# Patient Record
Sex: MALE | Race: ASIAN | NOT HISPANIC OR LATINO | URBAN - METROPOLITAN AREA
[De-identification: names, ages, dates, MRNs, and addresses within clinical notes are randomized per-mention and may not be internally consistent; named-entity substitution may affect disease eponyms.]

---

## 2020-02-10 ENCOUNTER — INPATIENT (INPATIENT)
Facility: HOSPITAL | Age: 85
LOS: 17 days | Discharge: NURSING, REHAB OR LONG-TERM CARE | DRG: 280 | End: 2020-02-28
Attending: HOSPITALIST | Admitting: HOSPITALIST
Payer: MEDICARE

## 2020-02-10 VITALS
SYSTOLIC BLOOD PRESSURE: 79 MMHG | OXYGEN SATURATION: 97 % | DIASTOLIC BLOOD PRESSURE: 61 MMHG | RESPIRATION RATE: 29 BRPM

## 2020-02-10 DIAGNOSIS — Z95.1 PRESENCE OF AORTOCORONARY BYPASS GRAFT: Chronic | ICD-10-CM

## 2020-02-10 DIAGNOSIS — Z98.890 OTHER SPECIFIED POSTPROCEDURAL STATES: Chronic | ICD-10-CM

## 2020-02-10 LAB
ALBUMIN SERPL ELPH-MCNC: 3 G/DL — LOW (ref 3.3–5)
ALBUMIN SERPL ELPH-MCNC: 3.2 G/DL — LOW (ref 3.3–5)
ALBUMIN SERPL ELPH-MCNC: 3.3 G/DL — SIGNIFICANT CHANGE UP (ref 3.3–5)
ALBUMIN SERPL ELPH-MCNC: 3.5 G/DL — SIGNIFICANT CHANGE UP (ref 3.3–5)
ALP SERPL-CCNC: 47 U/L — SIGNIFICANT CHANGE UP (ref 40–120)
ALP SERPL-CCNC: 49 U/L — SIGNIFICANT CHANGE UP (ref 40–120)
ALP SERPL-CCNC: 52 U/L — SIGNIFICANT CHANGE UP (ref 40–120)
ALP SERPL-CCNC: 52 U/L — SIGNIFICANT CHANGE UP (ref 40–120)
ALT FLD-CCNC: 46 U/L — HIGH (ref 10–45)
ALT FLD-CCNC: 48 U/L — HIGH (ref 10–45)
ALT FLD-CCNC: 49 U/L — HIGH (ref 10–45)
ALT FLD-CCNC: 55 U/L — HIGH (ref 10–45)
ANION GAP SERPL CALC-SCNC: 13 MMOL/L — SIGNIFICANT CHANGE UP (ref 5–17)
ANION GAP SERPL CALC-SCNC: 14 MMOL/L — SIGNIFICANT CHANGE UP (ref 5–17)
ANION GAP SERPL CALC-SCNC: 14 MMOL/L — SIGNIFICANT CHANGE UP (ref 5–17)
ANION GAP SERPL CALC-SCNC: 15 MMOL/L — SIGNIFICANT CHANGE UP (ref 5–17)
APPEARANCE UR: CLEAR — SIGNIFICANT CHANGE UP
APTT BLD: 32.3 SEC — SIGNIFICANT CHANGE UP (ref 27.5–36.3)
APTT BLD: 37.2 SEC — HIGH (ref 27.5–36.3)
APTT BLD: 69.8 SEC — HIGH (ref 27.5–36.3)
APTT BLD: 88.7 SEC — HIGH (ref 27.5–36.3)
AST SERPL-CCNC: 101 U/L — HIGH (ref 10–40)
AST SERPL-CCNC: 101 U/L — HIGH (ref 10–40)
AST SERPL-CCNC: 96 U/L — HIGH (ref 10–40)
AST SERPL-CCNC: 99 U/L — HIGH (ref 10–40)
BASE EXCESS BLDA CALC-SCNC: -0.3 MMOL/L — SIGNIFICANT CHANGE UP (ref -2–3)
BASE EXCESS BLDA CALC-SCNC: -2.6 MMOL/L — LOW (ref -2–3)
BASE EXCESS BLDA CALC-SCNC: 0.2 MMOL/L — SIGNIFICANT CHANGE UP (ref -2–3)
BASE EXCESS BLDV CALC-SCNC: -8.6 MMOL/L — SIGNIFICANT CHANGE UP
BASOPHILS # BLD AUTO: 0.03 K/UL — SIGNIFICANT CHANGE UP (ref 0–0.2)
BASOPHILS # BLD AUTO: 0.03 K/UL — SIGNIFICANT CHANGE UP (ref 0–0.2)
BASOPHILS # BLD AUTO: 0.04 K/UL — SIGNIFICANT CHANGE UP (ref 0–0.2)
BASOPHILS NFR BLD AUTO: 0.2 % — SIGNIFICANT CHANGE UP (ref 0–2)
BASOPHILS NFR BLD AUTO: 0.2 % — SIGNIFICANT CHANGE UP (ref 0–2)
BASOPHILS NFR BLD AUTO: 0.3 % — SIGNIFICANT CHANGE UP (ref 0–2)
BILIRUB SERPL-MCNC: 0.3 MG/DL — SIGNIFICANT CHANGE UP (ref 0.2–1.2)
BILIRUB SERPL-MCNC: 0.4 MG/DL — SIGNIFICANT CHANGE UP (ref 0.2–1.2)
BILIRUB UR-MCNC: NEGATIVE — SIGNIFICANT CHANGE UP
BLD GP AB SCN SERPL QL: NEGATIVE — SIGNIFICANT CHANGE UP
BLD GP AB SCN SERPL QL: NEGATIVE — SIGNIFICANT CHANGE UP
BUN SERPL-MCNC: 20 MG/DL — SIGNIFICANT CHANGE UP (ref 7–23)
BUN SERPL-MCNC: 22 MG/DL — SIGNIFICANT CHANGE UP (ref 7–23)
BUN SERPL-MCNC: 24 MG/DL — HIGH (ref 7–23)
BUN SERPL-MCNC: 27 MG/DL — HIGH (ref 7–23)
CALCIUM SERPL-MCNC: 8.3 MG/DL — LOW (ref 8.4–10.5)
CALCIUM SERPL-MCNC: 8.4 MG/DL — SIGNIFICANT CHANGE UP (ref 8.4–10.5)
CALCIUM SERPL-MCNC: 8.4 MG/DL — SIGNIFICANT CHANGE UP (ref 8.4–10.5)
CALCIUM SERPL-MCNC: 8.6 MG/DL — SIGNIFICANT CHANGE UP (ref 8.4–10.5)
CHLORIDE SERPL-SCNC: 108 MMOL/L — SIGNIFICANT CHANGE UP (ref 96–108)
CHLORIDE SERPL-SCNC: 109 MMOL/L — HIGH (ref 96–108)
CHLORIDE SERPL-SCNC: 110 MMOL/L — HIGH (ref 96–108)
CHLORIDE SERPL-SCNC: 110 MMOL/L — HIGH (ref 96–108)
CHOLEST SERPL-MCNC: 102 MG/DL — SIGNIFICANT CHANGE UP (ref 10–199)
CK MB CFR SERPL CALC: 33.2 NG/ML — HIGH (ref 0–6.7)
CK MB CFR SERPL CALC: 48.2 NG/ML — HIGH (ref 0–6.7)
CK MB CFR SERPL CALC: 53 NG/ML — HIGH (ref 0–6.7)
CK MB CFR SERPL CALC: 60.6 NG/ML — HIGH (ref 0–6.7)
CK MB CFR SERPL CALC: 63.3 NG/ML — HIGH (ref 0–6.7)
CK SERPL-CCNC: 496 U/L — HIGH (ref 30–200)
CK SERPL-CCNC: 695 U/L — HIGH (ref 30–200)
CK SERPL-CCNC: 832 U/L — HIGH (ref 30–200)
CK SERPL-CCNC: 923 U/L — HIGH (ref 30–200)
CK SERPL-CCNC: 963 U/L — HIGH (ref 30–200)
CO2 SERPL-SCNC: 16 MMOL/L — LOW (ref 22–31)
CO2 SERPL-SCNC: 18 MMOL/L — LOW (ref 22–31)
CO2 SERPL-SCNC: 19 MMOL/L — LOW (ref 22–31)
CO2 SERPL-SCNC: 20 MMOL/L — LOW (ref 22–31)
COLOR SPEC: YELLOW — SIGNIFICANT CHANGE UP
CREAT SERPL-MCNC: 0.99 MG/DL — SIGNIFICANT CHANGE UP (ref 0.5–1.3)
CREAT SERPL-MCNC: 1.02 MG/DL — SIGNIFICANT CHANGE UP (ref 0.5–1.3)
CREAT SERPL-MCNC: 1.07 MG/DL — SIGNIFICANT CHANGE UP (ref 0.5–1.3)
CREAT SERPL-MCNC: 1.16 MG/DL — SIGNIFICANT CHANGE UP (ref 0.5–1.3)
DIFF PNL FLD: ABNORMAL
EOSINOPHIL # BLD AUTO: 0 K/UL — SIGNIFICANT CHANGE UP (ref 0–0.5)
EOSINOPHIL NFR BLD AUTO: 0 % — SIGNIFICANT CHANGE UP (ref 0–6)
GAS PNL BLDA: SIGNIFICANT CHANGE UP
GAS PNL BLDA: SIGNIFICANT CHANGE UP
GAS PNL BLDV: SIGNIFICANT CHANGE UP
GAS PNL BLDV: SIGNIFICANT CHANGE UP
GLUCOSE BLDC GLUCOMTR-MCNC: 141 MG/DL — HIGH (ref 70–99)
GLUCOSE BLDC GLUCOMTR-MCNC: 195 MG/DL — HIGH (ref 70–99)
GLUCOSE SERPL-MCNC: 132 MG/DL — HIGH (ref 70–99)
GLUCOSE SERPL-MCNC: 174 MG/DL — HIGH (ref 70–99)
GLUCOSE SERPL-MCNC: 231 MG/DL — HIGH (ref 70–99)
GLUCOSE SERPL-MCNC: 244 MG/DL — HIGH (ref 70–99)
GLUCOSE UR QL: NEGATIVE — SIGNIFICANT CHANGE UP
HBA1C BLD-MCNC: 6.7 % — HIGH (ref 4–5.6)
HCO3 BLDA-SCNC: 21 MMOL/L — SIGNIFICANT CHANGE UP (ref 21–28)
HCO3 BLDA-SCNC: 22 MMOL/L — SIGNIFICANT CHANGE UP (ref 21–28)
HCO3 BLDA-SCNC: 22 MMOL/L — SIGNIFICANT CHANGE UP (ref 21–28)
HCO3 BLDV-SCNC: 17 MMOL/L — LOW (ref 20–27)
HCT VFR BLD CALC: 29.5 % — LOW (ref 39–50)
HCT VFR BLD CALC: 31 % — LOW (ref 39–50)
HCT VFR BLD CALC: 31 % — LOW (ref 39–50)
HCT VFR BLD CALC: 32.2 % — LOW (ref 39–50)
HCT VFR BLD CALC: 32.2 % — LOW (ref 39–50)
HDLC SERPL-MCNC: 32 MG/DL — LOW
HGB BLD-MCNC: 10.5 G/DL — LOW (ref 13–17)
HGB BLD-MCNC: 10.7 G/DL — LOW (ref 13–17)
HGB BLD-MCNC: 10.7 G/DL — LOW (ref 13–17)
HGB BLD-MCNC: 9.8 G/DL — LOW (ref 13–17)
HGB BLD-MCNC: 9.9 G/DL — LOW (ref 13–17)
IMM GRANULOCYTES NFR BLD AUTO: 0.5 % — SIGNIFICANT CHANGE UP (ref 0–1.5)
IMM GRANULOCYTES NFR BLD AUTO: 0.5 % — SIGNIFICANT CHANGE UP (ref 0–1.5)
IMM GRANULOCYTES NFR BLD AUTO: 0.6 % — SIGNIFICANT CHANGE UP (ref 0–1.5)
INR BLD: 1.25 — HIGH (ref 0.88–1.16)
INR BLD: 1.26 — HIGH (ref 0.88–1.16)
INR BLD: 1.39 — HIGH (ref 0.88–1.16)
KETONES UR-MCNC: ABNORMAL MG/DL
LACTATE SERPL-SCNC: 2.6 MMOL/L — HIGH (ref 0.5–2)
LACTATE SERPL-SCNC: 3.3 MMOL/L — HIGH (ref 0.5–2)
LACTATE SERPL-SCNC: 3.7 MMOL/L — HIGH (ref 0.5–2)
LACTATE SERPL-SCNC: 4.1 MMOL/L — CRITICAL HIGH (ref 0.5–2)
LACTATE SERPL-SCNC: 4.3 MMOL/L — CRITICAL HIGH (ref 0.5–2)
LACTATE SERPL-SCNC: 4.4 MMOL/L — CRITICAL HIGH (ref 0.5–2)
LACTATE SERPL-SCNC: 6.5 MMOL/L — CRITICAL HIGH (ref 0.5–2)
LEUKOCYTE ESTERASE UR-ACNC: ABNORMAL
LIPID PNL WITH DIRECT LDL SERPL: 28 MG/DL — SIGNIFICANT CHANGE UP
LYMPHOCYTES # BLD AUTO: 1.4 K/UL — SIGNIFICANT CHANGE UP (ref 1–3.3)
LYMPHOCYTES # BLD AUTO: 1.4 K/UL — SIGNIFICANT CHANGE UP (ref 1–3.3)
LYMPHOCYTES # BLD AUTO: 1.96 K/UL — SIGNIFICANT CHANGE UP (ref 1–3.3)
LYMPHOCYTES # BLD AUTO: 10.8 % — LOW (ref 13–44)
LYMPHOCYTES # BLD AUTO: 12.1 % — LOW (ref 13–44)
LYMPHOCYTES # BLD AUTO: 15.1 % — SIGNIFICANT CHANGE UP (ref 13–44)
MAGNESIUM SERPL-MCNC: 1.8 MG/DL — SIGNIFICANT CHANGE UP (ref 1.6–2.6)
MAGNESIUM SERPL-MCNC: 2 MG/DL — SIGNIFICANT CHANGE UP (ref 1.6–2.6)
MAGNESIUM SERPL-MCNC: 2 MG/DL — SIGNIFICANT CHANGE UP (ref 1.6–2.6)
MCHC RBC-ENTMCNC: 31.3 PG — SIGNIFICANT CHANGE UP (ref 27–34)
MCHC RBC-ENTMCNC: 31.4 PG — SIGNIFICANT CHANGE UP (ref 27–34)
MCHC RBC-ENTMCNC: 31.5 PG — SIGNIFICANT CHANGE UP (ref 27–34)
MCHC RBC-ENTMCNC: 31.6 GM/DL — LOW (ref 32–36)
MCHC RBC-ENTMCNC: 31.6 PG — SIGNIFICANT CHANGE UP (ref 27–34)
MCHC RBC-ENTMCNC: 31.6 PG — SIGNIFICANT CHANGE UP (ref 27–34)
MCHC RBC-ENTMCNC: 33.2 GM/DL — SIGNIFICANT CHANGE UP (ref 32–36)
MCHC RBC-ENTMCNC: 33.2 GM/DL — SIGNIFICANT CHANGE UP (ref 32–36)
MCHC RBC-ENTMCNC: 33.6 GM/DL — SIGNIFICANT CHANGE UP (ref 32–36)
MCHC RBC-ENTMCNC: 33.9 GM/DL — SIGNIFICANT CHANGE UP (ref 32–36)
MCV RBC AUTO: 92.8 FL — SIGNIFICANT CHANGE UP (ref 80–100)
MCV RBC AUTO: 94.2 FL — SIGNIFICANT CHANGE UP (ref 80–100)
MCV RBC AUTO: 94.2 FL — SIGNIFICANT CHANGE UP (ref 80–100)
MCV RBC AUTO: 95 FL — SIGNIFICANT CHANGE UP (ref 80–100)
MCV RBC AUTO: 99.7 FL — SIGNIFICANT CHANGE UP (ref 80–100)
MONOCYTES # BLD AUTO: 0.97 K/UL — HIGH (ref 0–0.9)
MONOCYTES # BLD AUTO: 1.03 K/UL — HIGH (ref 0–0.9)
MONOCYTES # BLD AUTO: 1.23 K/UL — HIGH (ref 0–0.9)
MONOCYTES NFR BLD AUTO: 8 % — SIGNIFICANT CHANGE UP (ref 2–14)
MONOCYTES NFR BLD AUTO: 8.4 % — SIGNIFICANT CHANGE UP (ref 2–14)
MONOCYTES NFR BLD AUTO: 9.5 % — SIGNIFICANT CHANGE UP (ref 2–14)
NEUTROPHILS # BLD AUTO: 10.39 K/UL — HIGH (ref 1.8–7.4)
NEUTROPHILS # BLD AUTO: 9.06 K/UL — HIGH (ref 1.8–7.4)
NEUTROPHILS # BLD AUTO: 9.66 K/UL — HIGH (ref 1.8–7.4)
NEUTROPHILS NFR BLD AUTO: 74.7 % — SIGNIFICANT CHANGE UP (ref 43–77)
NEUTROPHILS NFR BLD AUTO: 78.6 % — HIGH (ref 43–77)
NEUTROPHILS NFR BLD AUTO: 80.5 % — HIGH (ref 43–77)
NITRITE UR-MCNC: NEGATIVE — SIGNIFICANT CHANGE UP
NRBC # BLD: 0 /100 WBCS — SIGNIFICANT CHANGE UP (ref 0–0)
NT-PROBNP SERPL-SCNC: 918 PG/ML — HIGH (ref 0–300)
PCO2 BLDA: 25 MMHG — LOW (ref 35–48)
PCO2 BLDA: 27 MMHG — LOW (ref 35–48)
PCO2 BLDA: 31 MMHG — LOW (ref 35–48)
PCO2 BLDV: 38 MMHG — LOW (ref 41–51)
PH BLDA: 7.44 — SIGNIFICANT CHANGE UP (ref 7.35–7.45)
PH BLDA: 7.52 — HIGH (ref 7.35–7.45)
PH BLDA: 7.55 — HIGH (ref 7.35–7.45)
PH BLDV: 7.28 — LOW (ref 7.32–7.43)
PH UR: 6 — SIGNIFICANT CHANGE UP (ref 5–8)
PHOSPHATE SERPL-MCNC: 1.8 MG/DL — LOW (ref 2.5–4.5)
PHOSPHATE SERPL-MCNC: 3.4 MG/DL — SIGNIFICANT CHANGE UP (ref 2.5–4.5)
PHOSPHATE SERPL-MCNC: 3.5 MG/DL — SIGNIFICANT CHANGE UP (ref 2.5–4.5)
PLATELET # BLD AUTO: 145 K/UL — LOW (ref 150–400)
PLATELET # BLD AUTO: 148 K/UL — LOW (ref 150–400)
PLATELET # BLD AUTO: 150 K/UL — SIGNIFICANT CHANGE UP (ref 150–400)
PLATELET # BLD AUTO: 153 K/UL — SIGNIFICANT CHANGE UP (ref 150–400)
PLATELET # BLD AUTO: 160 K/UL — SIGNIFICANT CHANGE UP (ref 150–400)
PO2 BLDA: 104 MMHG — SIGNIFICANT CHANGE UP (ref 83–108)
PO2 BLDA: 136 MMHG — HIGH (ref 83–108)
PO2 BLDA: 90 MMHG — SIGNIFICANT CHANGE UP (ref 83–108)
PO2 BLDV: 27 MMHG — SIGNIFICANT CHANGE UP
POTASSIUM SERPL-MCNC: 4.3 MMOL/L — SIGNIFICANT CHANGE UP (ref 3.5–5.3)
POTASSIUM SERPL-MCNC: 4.3 MMOL/L — SIGNIFICANT CHANGE UP (ref 3.5–5.3)
POTASSIUM SERPL-MCNC: 4.7 MMOL/L — SIGNIFICANT CHANGE UP (ref 3.5–5.3)
POTASSIUM SERPL-MCNC: 5 MMOL/L — SIGNIFICANT CHANGE UP (ref 3.5–5.3)
POTASSIUM SERPL-SCNC: 4.3 MMOL/L — SIGNIFICANT CHANGE UP (ref 3.5–5.3)
POTASSIUM SERPL-SCNC: 4.3 MMOL/L — SIGNIFICANT CHANGE UP (ref 3.5–5.3)
POTASSIUM SERPL-SCNC: 4.7 MMOL/L — SIGNIFICANT CHANGE UP (ref 3.5–5.3)
POTASSIUM SERPL-SCNC: 5 MMOL/L — SIGNIFICANT CHANGE UP (ref 3.5–5.3)
PROT SERPL-MCNC: 5.6 G/DL — LOW (ref 6–8.3)
PROT SERPL-MCNC: 5.6 G/DL — LOW (ref 6–8.3)
PROT SERPL-MCNC: 5.9 G/DL — LOW (ref 6–8.3)
PROT SERPL-MCNC: 5.9 G/DL — LOW (ref 6–8.3)
PROT UR-MCNC: ABNORMAL MG/DL
PROTHROM AB SERPL-ACNC: 14.3 SEC — HIGH (ref 10–12.9)
PROTHROM AB SERPL-ACNC: 14.5 SEC — HIGH (ref 10–12.9)
PROTHROM AB SERPL-ACNC: 16 SEC — HIGH (ref 10–12.9)
RBC # BLD: 3.11 M/UL — LOW (ref 4.2–5.8)
RBC # BLD: 3.13 M/UL — LOW (ref 4.2–5.8)
RBC # BLD: 3.34 M/UL — LOW (ref 4.2–5.8)
RBC # BLD: 3.39 M/UL — LOW (ref 4.2–5.8)
RBC # BLD: 3.42 M/UL — LOW (ref 4.2–5.8)
RBC # FLD: 15 % — HIGH (ref 10.3–14.5)
RBC # FLD: 15.4 % — HIGH (ref 10.3–14.5)
RBC # FLD: 15.4 % — HIGH (ref 10.3–14.5)
RBC # FLD: 15.5 % — HIGH (ref 10.3–14.5)
RBC # FLD: 15.6 % — HIGH (ref 10.3–14.5)
RH IG SCN BLD-IMP: POSITIVE — SIGNIFICANT CHANGE UP
RH IG SCN BLD-IMP: POSITIVE — SIGNIFICANT CHANGE UP
SAO2 % BLDA: 97 % — SIGNIFICANT CHANGE UP (ref 95–100)
SAO2 % BLDA: 98 % — SIGNIFICANT CHANGE UP (ref 95–100)
SAO2 % BLDA: 99 % — SIGNIFICANT CHANGE UP (ref 95–100)
SAO2 % BLDV: 41 % — SIGNIFICANT CHANGE UP
SODIUM SERPL-SCNC: 141 MMOL/L — SIGNIFICANT CHANGE UP (ref 135–145)
SODIUM SERPL-SCNC: 143 MMOL/L — SIGNIFICANT CHANGE UP (ref 135–145)
SP GR SPEC: 1.02 — SIGNIFICANT CHANGE UP (ref 1–1.03)
TOTAL CHOLESTEROL/HDL RATIO MEASUREMENT: 3.2 RATIO — LOW (ref 3.4–9.6)
TRIGL SERPL-MCNC: 209 MG/DL — HIGH (ref 10–149)
TROPONIN T SERPL-MCNC: 0.94 NG/ML — CRITICAL HIGH (ref 0–0.01)
TROPONIN T SERPL-MCNC: 1.17 NG/ML — CRITICAL HIGH (ref 0–0.01)
TROPONIN T SERPL-MCNC: 1.46 NG/ML — CRITICAL HIGH (ref 0–0.01)
TROPONIN T SERPL-MCNC: 1.64 NG/ML — CRITICAL HIGH (ref 0–0.01)
TROPONIN T SERPL-MCNC: 1.75 NG/ML — CRITICAL HIGH (ref 0–0.01)
TSH SERPL-MCNC: 1.59 UIU/ML — SIGNIFICANT CHANGE UP (ref 0.35–4.94)
UROBILINOGEN FLD QL: 0.2 E.U./DL — SIGNIFICANT CHANGE UP
WBC # BLD: 11.54 K/UL — HIGH (ref 3.8–10.5)
WBC # BLD: 12.91 K/UL — HIGH (ref 3.8–10.5)
WBC # BLD: 13.01 K/UL — HIGH (ref 3.8–10.5)
WBC # BLD: 13.91 K/UL — HIGH (ref 3.8–10.5)
WBC # BLD: 15.11 K/UL — HIGH (ref 3.8–10.5)
WBC # FLD AUTO: 11.54 K/UL — HIGH (ref 3.8–10.5)
WBC # FLD AUTO: 12.91 K/UL — HIGH (ref 3.8–10.5)
WBC # FLD AUTO: 13.01 K/UL — HIGH (ref 3.8–10.5)
WBC # FLD AUTO: 13.91 K/UL — HIGH (ref 3.8–10.5)
WBC # FLD AUTO: 15.11 K/UL — HIGH (ref 3.8–10.5)

## 2020-02-10 PROCEDURE — 99292 CRITICAL CARE ADDL 30 MIN: CPT

## 2020-02-10 PROCEDURE — 99222 1ST HOSP IP/OBS MODERATE 55: CPT

## 2020-02-10 PROCEDURE — 99291 CRITICAL CARE FIRST HOUR: CPT

## 2020-02-10 PROCEDURE — 71045 X-RAY EXAM CHEST 1 VIEW: CPT | Mod: 26,76

## 2020-02-10 PROCEDURE — 93010 ELECTROCARDIOGRAM REPORT: CPT

## 2020-02-10 PROCEDURE — 93306 TTE W/DOPPLER COMPLETE: CPT | Mod: 26

## 2020-02-10 RX ORDER — IPRATROPIUM/ALBUTEROL SULFATE 18-103MCG
3 AEROSOL WITH ADAPTER (GRAM) INHALATION EVERY 6 HOURS
Refills: 0 | Status: DISCONTINUED | OUTPATIENT
Start: 2020-02-10 | End: 2020-02-28

## 2020-02-10 RX ORDER — GLUCAGON INJECTION, SOLUTION 0.5 MG/.1ML
1 INJECTION, SOLUTION SUBCUTANEOUS ONCE
Refills: 0 | Status: DISCONTINUED | OUTPATIENT
Start: 2020-02-10 | End: 2020-02-28

## 2020-02-10 RX ORDER — DUTASTERIDE 0.5 MG/1
1 CAPSULE, LIQUID FILLED ORAL
Qty: 0 | Refills: 0 | DISCHARGE

## 2020-02-10 RX ORDER — DEXTROSE 50 % IN WATER 50 %
12.5 SYRINGE (ML) INTRAVENOUS ONCE
Refills: 0 | Status: DISCONTINUED | OUTPATIENT
Start: 2020-02-10 | End: 2020-02-28

## 2020-02-10 RX ORDER — CLOPIDOGREL BISULFATE 75 MG/1
75 TABLET, FILM COATED ORAL DAILY
Refills: 0 | Status: DISCONTINUED | OUTPATIENT
Start: 2020-02-10 | End: 2020-02-10

## 2020-02-10 RX ORDER — FUROSEMIDE 40 MG
40 TABLET ORAL ONCE
Refills: 0 | Status: COMPLETED | OUTPATIENT
Start: 2020-02-10 | End: 2020-02-10

## 2020-02-10 RX ORDER — CHLORHEXIDINE GLUCONATE 213 G/1000ML
1 SOLUTION TOPICAL
Refills: 0 | Status: DISCONTINUED | OUTPATIENT
Start: 2020-02-10 | End: 2020-02-26

## 2020-02-10 RX ORDER — ASPIRIN/CALCIUM CARB/MAGNESIUM 324 MG
81 TABLET ORAL DAILY
Refills: 0 | Status: DISCONTINUED | OUTPATIENT
Start: 2020-02-10 | End: 2020-02-11

## 2020-02-10 RX ORDER — PANTOPRAZOLE SODIUM 20 MG/1
40 TABLET, DELAYED RELEASE ORAL EVERY 12 HOURS
Refills: 0 | Status: DISCONTINUED | OUTPATIENT
Start: 2020-02-10 | End: 2020-02-20

## 2020-02-10 RX ORDER — DOBUTAMINE HCL 250MG/20ML
5 VIAL (ML) INTRAVENOUS
Qty: 500 | Refills: 0 | Status: DISCONTINUED | OUTPATIENT
Start: 2020-02-10 | End: 2020-02-11

## 2020-02-10 RX ORDER — ASPIRIN/CALCIUM CARB/MAGNESIUM 324 MG
81 TABLET ORAL DAILY
Refills: 0 | Status: DISCONTINUED | OUTPATIENT
Start: 2020-02-10 | End: 2020-02-10

## 2020-02-10 RX ORDER — DONEPEZIL HYDROCHLORIDE 10 MG/1
5 TABLET, FILM COATED ORAL AT BEDTIME
Refills: 0 | Status: DISCONTINUED | OUTPATIENT
Start: 2020-02-10 | End: 2020-02-22

## 2020-02-10 RX ORDER — SODIUM CHLORIDE 9 MG/ML
1000 INJECTION, SOLUTION INTRAVENOUS
Refills: 0 | Status: DISCONTINUED | OUTPATIENT
Start: 2020-02-10 | End: 2020-02-28

## 2020-02-10 RX ORDER — DEXTROSE 50 % IN WATER 50 %
25 SYRINGE (ML) INTRAVENOUS ONCE
Refills: 0 | Status: DISCONTINUED | OUTPATIENT
Start: 2020-02-10 | End: 2020-02-28

## 2020-02-10 RX ORDER — MEMANTINE HYDROCHLORIDE 10 MG/1
10 TABLET ORAL
Refills: 0 | Status: DISCONTINUED | OUTPATIENT
Start: 2020-02-10 | End: 2020-02-23

## 2020-02-10 RX ORDER — MAGNESIUM SULFATE 500 MG/ML
1 VIAL (ML) INJECTION ONCE
Refills: 0 | Status: COMPLETED | OUTPATIENT
Start: 2020-02-10 | End: 2020-02-10

## 2020-02-10 RX ORDER — FINASTERIDE 5 MG/1
5 TABLET, FILM COATED ORAL DAILY
Refills: 0 | Status: DISCONTINUED | OUTPATIENT
Start: 2020-02-10 | End: 2020-02-28

## 2020-02-10 RX ORDER — CLOPIDOGREL BISULFATE 75 MG/1
300 TABLET, FILM COATED ORAL ONCE
Refills: 0 | Status: DISCONTINUED | OUTPATIENT
Start: 2020-02-10 | End: 2020-02-10

## 2020-02-10 RX ORDER — HEPARIN SODIUM 5000 [USP'U]/ML
740 INJECTION INTRAVENOUS; SUBCUTANEOUS
Qty: 25000 | Refills: 0 | Status: DISCONTINUED | OUTPATIENT
Start: 2020-02-10 | End: 2020-02-10

## 2020-02-10 RX ORDER — ASPIRIN/CALCIUM CARB/MAGNESIUM 324 MG
1 TABLET ORAL
Qty: 0 | Refills: 0 | DISCHARGE

## 2020-02-10 RX ORDER — CLOPIDOGREL BISULFATE 75 MG/1
225 TABLET, FILM COATED ORAL ONCE
Refills: 0 | Status: COMPLETED | OUTPATIENT
Start: 2020-02-10 | End: 2020-02-10

## 2020-02-10 RX ORDER — HEPARIN SODIUM 5000 [USP'U]/ML
900 INJECTION INTRAVENOUS; SUBCUTANEOUS
Qty: 25000 | Refills: 0 | Status: DISCONTINUED | OUTPATIENT
Start: 2020-02-10 | End: 2020-02-10

## 2020-02-10 RX ORDER — DEXTROSE 50 % IN WATER 50 %
15 SYRINGE (ML) INTRAVENOUS ONCE
Refills: 0 | Status: DISCONTINUED | OUTPATIENT
Start: 2020-02-10 | End: 2020-02-28

## 2020-02-10 RX ORDER — DOBUTAMINE HCL 250MG/20ML
0.5 VIAL (ML) INTRAVENOUS
Qty: 500 | Refills: 0 | Status: DISCONTINUED | OUTPATIENT
Start: 2020-02-10 | End: 2020-02-10

## 2020-02-10 RX ORDER — LATANOPROST 0.05 MG/ML
1 SOLUTION/ DROPS OPHTHALMIC; TOPICAL
Qty: 0 | Refills: 0 | DISCHARGE

## 2020-02-10 RX ORDER — INSULIN LISPRO 100/ML
VIAL (ML) SUBCUTANEOUS EVERY 6 HOURS
Refills: 0 | Status: DISCONTINUED | OUTPATIENT
Start: 2020-02-10 | End: 2020-02-28

## 2020-02-10 RX ORDER — CLOPIDOGREL BISULFATE 75 MG/1
75 TABLET, FILM COATED ORAL DAILY
Refills: 0 | Status: DISCONTINUED | OUTPATIENT
Start: 2020-02-10 | End: 2020-02-11

## 2020-02-10 RX ORDER — LATANOPROST 0.05 MG/ML
1 SOLUTION/ DROPS OPHTHALMIC; TOPICAL AT BEDTIME
Refills: 0 | Status: DISCONTINUED | OUTPATIENT
Start: 2020-02-10 | End: 2020-02-10

## 2020-02-10 RX ORDER — PANTOPRAZOLE SODIUM 20 MG/1
80 TABLET, DELAYED RELEASE ORAL ONCE
Refills: 0 | Status: COMPLETED | OUTPATIENT
Start: 2020-02-10 | End: 2020-02-10

## 2020-02-10 RX ORDER — PANTOPRAZOLE SODIUM 20 MG/1
8 TABLET, DELAYED RELEASE ORAL
Qty: 80 | Refills: 0 | Status: DISCONTINUED | OUTPATIENT
Start: 2020-02-10 | End: 2020-02-10

## 2020-02-10 RX ORDER — HEPARIN SODIUM 5000 [USP'U]/ML
750 INJECTION INTRAVENOUS; SUBCUTANEOUS
Qty: 25000 | Refills: 0 | Status: DISCONTINUED | OUTPATIENT
Start: 2020-02-10 | End: 2020-02-11

## 2020-02-10 RX ORDER — ATORVASTATIN CALCIUM 80 MG/1
40 TABLET, FILM COATED ORAL AT BEDTIME
Refills: 0 | Status: DISCONTINUED | OUTPATIENT
Start: 2020-02-10 | End: 2020-02-28

## 2020-02-10 RX ORDER — LATANOPROST 0.05 MG/ML
1 SOLUTION/ DROPS OPHTHALMIC; TOPICAL AT BEDTIME
Refills: 0 | Status: DISCONTINUED | OUTPATIENT
Start: 2020-02-10 | End: 2020-02-28

## 2020-02-10 RX ORDER — METFORMIN HYDROCHLORIDE 850 MG/1
1 TABLET ORAL
Qty: 0 | Refills: 0 | DISCHARGE

## 2020-02-10 RX ADMIN — MEMANTINE HYDROCHLORIDE 10 MILLIGRAM(S): 10 TABLET ORAL at 17:31

## 2020-02-10 RX ADMIN — MEMANTINE HYDROCHLORIDE 10 MILLIGRAM(S): 10 TABLET ORAL at 06:06

## 2020-02-10 RX ADMIN — Medication 81 MILLIGRAM(S): at 14:02

## 2020-02-10 RX ADMIN — FINASTERIDE 5 MILLIGRAM(S): 5 TABLET, FILM COATED ORAL at 11:48

## 2020-02-10 RX ADMIN — Medication 2: at 17:31

## 2020-02-10 RX ADMIN — PANTOPRAZOLE SODIUM 10 MG/HR: 20 TABLET, DELAYED RELEASE ORAL at 05:19

## 2020-02-10 RX ADMIN — Medication 3 MILLILITER(S): at 22:36

## 2020-02-10 RX ADMIN — Medication 10.23 MICROGRAM(S)/KG/MIN: at 13:41

## 2020-02-10 RX ADMIN — HEPARIN SODIUM 9 UNIT(S)/HR: 5000 INJECTION INTRAVENOUS; SUBCUTANEOUS at 04:05

## 2020-02-10 RX ADMIN — CLOPIDOGREL BISULFATE 75 MILLIGRAM(S): 75 TABLET, FILM COATED ORAL at 14:02

## 2020-02-10 RX ADMIN — Medication 40 MILLIGRAM(S): at 22:37

## 2020-02-10 RX ADMIN — CLOPIDOGREL BISULFATE 225 MILLIGRAM(S): 75 TABLET, FILM COATED ORAL at 16:04

## 2020-02-10 RX ADMIN — DONEPEZIL HYDROCHLORIDE 5 MILLIGRAM(S): 10 TABLET, FILM COATED ORAL at 21:02

## 2020-02-10 RX ADMIN — PANTOPRAZOLE SODIUM 80 MILLIGRAM(S): 20 TABLET, DELAYED RELEASE ORAL at 04:05

## 2020-02-10 RX ADMIN — Medication 1.02 MICROGRAM(S)/KG/MIN: at 02:31

## 2020-02-10 RX ADMIN — Medication 2: at 06:11

## 2020-02-10 RX ADMIN — CHLORHEXIDINE GLUCONATE 1 APPLICATION(S): 213 SOLUTION TOPICAL at 13:41

## 2020-02-10 RX ADMIN — HEPARIN SODIUM 900 UNIT(S)/HR: 5000 INJECTION INTRAVENOUS; SUBCUTANEOUS at 14:02

## 2020-02-10 RX ADMIN — Medication 40 MILLIGRAM(S): at 04:04

## 2020-02-10 RX ADMIN — PANTOPRAZOLE SODIUM 40 MILLIGRAM(S): 20 TABLET, DELAYED RELEASE ORAL at 17:31

## 2020-02-10 RX ADMIN — LATANOPROST 1 DROP(S): 0.05 SOLUTION/ DROPS OPHTHALMIC; TOPICAL at 22:37

## 2020-02-10 RX ADMIN — ATORVASTATIN CALCIUM 40 MILLIGRAM(S): 80 TABLET, FILM COATED ORAL at 21:02

## 2020-02-10 NOTE — PROGRESS NOTE ADULT - SUBJECTIVE AND OBJECTIVE BOX
JEREMIAHWILMERREDDY  87y  Male    Patient is a 87y old  Male who presents with a chief complaint of Cardiogenic shock, GIB, NSTEMI (10 Feb 2020 10:00)      INTERVAL HPI/OVERNIGHT EVENTS: Pt was transferred from [place] for NSTEMI in setting of GI bleed with trop-I 11, and ST depression in [place]/lateral leads, hep gtt started at [facility]. Since the patient arrived the heparin drip has been discontinued in case this was a NSTEMI type II from a GIB. The patient was started on dobutamine drip, with lactate downtrending from 6.5 to 2.6. Troponin T was .94 and uptrended to 1.17 while here so far. Protonix drip was transitioned to BID IVP. This morning the patient deneis any complaints of chest pain, shortness of breath, abdominal pain or bowel movements.     T(C): 36.8 (02-10-20 @ 09:12), Max: 36.9 (02-10-20 @ 04:37)  HR: 106 (02-10-20 @ 11:00) (99 - 111)  BP: 109/60 (02-10-20 @ 11:00) (79/61 - 109/66)  RR: 25 (02-10-20 @ 11:00) (18 - 29)  SpO2: 97% (02-10-20 @ 11:00) (97% - 100%)  Wt(kg): --Vital Signs Last 24 Hrs  T(C): 36.8 (10 Feb 2020 09:12), Max: 36.9 (10 Feb 2020 04:37)  T(F): 98.2 (10 Feb 2020 09:12), Max: 98.4 (10 Feb 2020 04:37)  HR: 106 (10 Feb 2020 11:00) (99 - 111)  BP: 109/60 (10 Feb 2020 11:00) (79/61 - 109/66)  BP(mean): 78 (10 Feb 2020 11:00) (66 - 80)  RR: 25 (10 Feb 2020 11:00) (18 - 29)  SpO2: 97% (10 Feb 2020 11:00) (97% - 100%)    PHYSICAL EXAM:  GENERAL: NAD, well-groomed, well-developed  HEAD:  Atraumatic, Normocephalic  EYES: EOMI, PERRLA, conjunctiva and sclera clear  ENMT: No tonsillar erythema, exudates, or enlargement; Moist mucous membranes, Good dentition, No lesions  NECK: Supple, No JVD, Normal thyroid  NERVOUS SYSTEM:  Alert & Oriented X3, Good concentration; Motor Strength 5/5 B/L upper and lower extremities; DTRs 2+ intact and symmetric  CHEST/LUNG: Clear to auscultation bilaterally; No rales, rhonchi, wheezing, or rubs  HEART: Regular rate and rhythm; No murmurs, rubs, or gallops  ABDOMEN: Soft, Nontender, Nondistended; Bowel sounds present  EXTREMITIES:  WWP, No clubbing, cyanosis, or edema  Vascular: 2+ peripheral pulses x4  LYMPH: No lymphadenopathy noted  SKIN: No rashes or lesions    Consultant(s) Notes Reviewed:  [x ] YES  [ ] NO  Care Discussed with Consultants/Other Providers [ x] YES  [ ] NO    LABS:                        10.5   13.01 )-----------( 150      ( 10 Feb 2020 11:42 )             31.0     02-10    141  |  110<H>  |  22  ----------------------------<  174<H>  4.7   |  18<L>  |  1.02    Ca    8.4      10 Feb 2020 07:40  Phos  3.5     02-10  Mg     2.0     02-10    TPro  5.9<L>  /  Alb  3.5  /  TBili  0.4  /  DBili  x   /  AST  101<H>  /  ALT  49<H>  /  AlkPhos  52  02-10      PT/INR - ( 10 Feb 2020 02:19 )   PT: 14.3 sec;   INR: 1.25          PTT - ( 10 Feb 2020 02:19 )  PTT:37.2 sec    CAPILLARY BLOOD GLUCOSE      POCT Blood Glucose.: 141 mg/dL (10 Feb 2020 11:37)  POCT Blood Glucose.: 195 mg/dL (10 Feb 2020 05:58)      ABG - ( 10 Feb 2020 06:39 )  pH, Arterial: 7.44  pH, Blood: x     /  pCO2: 31    /  pO2: 104   / HCO3: 21    / Base Excess: -2.6  /  SaO2: 98                    RADIOLOGY & ADDITIONAL TESTS:    Imaging Personally Reviewed:  [ ] YES  [ ] NO    HEALTH ISSUES - PROBLEM Dx: JEREMIAHWILMERREDDY  87y  Male    Patient is a 87y old  Male who presents with a chief complaint of Cardiogenic shock, GIB, NSTEMI (10 Feb 2020 10:00)      INTERVAL HPI/OVERNIGHT EVENTS: Pt was transferred from [place] for NSTEMI in setting of GI bleed with trop-I 11, and ST depression in [place]/lateral leads, hep gtt started [facility]. Since the patient arrived the heparin drip has been discontinued in case this was a NSTEMI type II from a GIB. The patient was started on dobutamine drip, with lactate downtrending from 6.5 to 2.6. Troponin T was .94 and uptrended to 1.17 while here so far. Protonix drip was transitioned to BID IVP. This morning the patient deneis any complaints of chest pain, shortness of breath, abdominal pain or bowel movements.     T(C): 36.8 (02-10-20 @ 09:12), Max: 36.9 (02-10-20 @ 04:37)  HR: 106 (02-10-20 @ 11:00) (99 - 111)  BP: 109/60 (02-10-20 @ 11:00) (79/61 - 109/66)  RR: 25 (02-10-20 @ 11:00) (18 - 29)  SpO2: 97% (02-10-20 @ 11:00) (97% - 100%)  Wt(kg): --Vital Signs Last 24 Hrs  T(C): 36.8 (10 Feb 2020 09:12), Max: 36.9 (10 Feb 2020 04:37)  T(F): 98.2 (10 Feb 2020 09:12), Max: 98.4 (10 Feb 2020 04:37)  HR: 106 (10 Feb 2020 11:00) (99 - 111)  BP: 109/60 (10 Feb 2020 11:00) (79/61 - 109/66)  BP(mean): 78 (10 Feb 2020 11:00) (66 - 80)  RR: 25 (10 Feb 2020 11:00) (18 - 29)  SpO2: 97% (10 Feb 2020 11:00) (97% - 100%)    PHYSICAL EXAM:  GENERAL: NAD, awake and fully alert  HEAD: Atraumatic, Normocephalic  ENMT: Moist mucous membranes  NECK: Supple, No JVD  NERVOUS SYSTEM:  Alert & Oriented X1  CHEST/LUNG: Bibasilar crackles  HEART: Regular rate and rhythm; No murmurs, rubs, or gallops  ABDOMEN: Soft, Nontender, Nondistended; Bowel sounds present  EXTREMITIES: WWP, No clubbing, cyanosis, or edema  Vascular: 2+ peripheral pulses x4    Consultant(s) Notes Reviewed:  [x ] YES  [ ] NO  Care Discussed with Consultants/Other Providers [ x] YES  [ ] NO    LABS:                        10.5   13.01 )-----------( 150      ( 10 Feb 2020 11:42 )             31.0     02-10    141  |  110<H>  |  22  ----------------------------<  174<H>  4.7   |  18<L>  |  1.02    Ca    8.4      10 Feb 2020 07:40  Phos  3.5     02-10  Mg     2.0     02-10    TPro  5.9<L>  /  Alb  3.5  /  TBili  0.4  /  DBili  x   /  AST  101<H>  /  ALT  49<H>  /  AlkPhos  52  02-10      PT/INR - ( 10 Feb 2020 02:19 )   PT: 14.3 sec;   INR: 1.25          PTT - ( 10 Feb 2020 02:19 )  PTT:37.2 sec    CAPILLARY BLOOD GLUCOSE      POCT Blood Glucose.: 141 mg/dL (10 Feb 2020 11:37)  POCT Blood Glucose.: 195 mg/dL (10 Feb 2020 05:58)      ABG - ( 10 Feb 2020 06:39 )  pH, Arterial: 7.44  pH, Blood: x     /  pCO2: 31    /  pO2: 104   / HCO3: 21    / Base Excess: -2.6  /  SaO2: 98                    RADIOLOGY & ADDITIONAL TESTS:    Imaging Personally Reviewed:  [ ] YES  [ ] NO    HEALTH ISSUES - PROBLEM Dx:

## 2020-02-10 NOTE — PROGRESS NOTE ADULT - ASSESSMENT
87M w pmh CAD, hx CABG approx 10 years ago, DM2, hernia, dementia (AAOx2 at baseline) presents for NSTEMI, cardiogenic shock, GIB admitted to CCU for further management.     PLAN:     CARDIOVASCULAR  #Cardiogenic shock  With known CAD, Hx of CABG. On arrival with BP 79/61, elevated lactate and LFTs, cool lower extremities, cool lower ext, altered mental status compared to baseline. CXR with pulmonary congestion. Bedside echo performed by cards fellow showing reduced EF. Darleen cardiac calculation off dobutamine drip w CO: 3.6, CI 2.1, SV 33 however SVO2 from VBG.   - started on dobutamine drip 5mcg/kg, given IV lasix 40 x 1  - central line placed in RIJ, c/w dobutamine drip, maintain map >65  - trend lactate to clear  - F/U AM ECHO    #NSTEMI vs type II MI  EKG w anterolateral lead ST depressions w avR elevations with CAD and hx of cbg. Given avR elevation possible LM disease. With concern for GIB, however rectal exam negative for blood. Trop T at [facility] 11.2. At [facility] Trop T .94, CKMB 33.2. However possible that hypovolemia 2/2 from the GIB is causing a demand ischemia leading to elevated cardiac enzymes. Cardiac enzymes continue to uptrend from .94 to 1.46 and CKMB 33.2 to 60.6 despite stable Hgb, no BMs and adequate perfusion which may point more towards a NSTEMI.   - Heparin gtt restarting due to thought of more likely NSTEMI   - ASA 81, Plavix 75 daily  - Initiated lipitor 40mg, AST likely elevated from NSTEMI or Type II MI, ALT just slightly elevated  - Continue to trend cardiac enzymes q4h       PULMONARY  #Pulmonary Edema  With B/L crackles, tachypnea, belly breathing on arrival. CXR w B/L pulmonary congestion. Bedside Echo with reduced EF per cards fellow. Not on diuretic at home. S/p lasix 40mg  - No requirement of diuresis at this time   - currently on 2L NC   - lewis in place for strict I/Os  - daily weights      NEURO  W baseline mental status AAOx2. Currently AAOx1 likely in the setting of cardiogenic shock   - c/w home meds donepezil, memantine     RENAL  At [facility] Cr 1.12, with Cr .99 on arrival  - monitor BMP     ENDO  #DM2   A1c 6.7 this admission. On metformin 500 bid at home.  - c/w MISS    GI  #GIB  With witnessed episode of melena, stool guiac positive at [facility] and received 1 u prbc, IV protonix 80 x 1 there. Pre transfusion hgb 10.4. With no additional episodes of melena, negative rectal exam. However with repeat hgb (post transfusion) at [facility] 9.8, tachycardia, hypotension, altered mental status. S/p 2 u pRBC  - GI consulted, per GI fellow, no plan for scope given poor hemodynamic status, plan for scope once patient is hemodynamically stable, of note, pt will need to be intubated for scope per GI  - Protonix 40mg IVP BID  - NPO except meds  - 3 PIVs, two 22g, one 18g    F - s/p 500cc NS en route to [facility]   E - replete prn  N - NPO    DVT PPx - heparin ggt (given NSTEMI)    LINES - 3 PIVs, two 22s, one 18g. RIJ place 2/9      CODE - FULL    DISPO - CCU 87M w pmh CAD, hx CABG approx 10 years ago, DM2, hernia, dementia (AAOx2 at baseline) presents for NSTEMI, cardiogenic shock, GIB admitted to CCU for further management.     PLAN:     CARDIOVASCULAR  #Cardiogenic shock  With known CAD, Hx of CABG. On arrival with BP 79/61, elevated lactate and LFTs, cool lower extremities, cool lower ext, altered mental status compared to baseline. CXR with pulmonary congestion. Bedside echo performed by cards fellow showing reduced EF. Darleen cardiac calculation off dobutamine drip w CO: 3.6, CI 2.1, SV 33 however SVO2 from VBG.   - started on dobutamine drip 5mcg/kg, given IV lasix 40 x 1  - central line placed in RIJ, c/w dobutamine drip, maintain map >65  - trend lactate to clear  - F/U AM ECHO    #NSTEMI vs type II MI  EKG w anterolateral lead ST depressions w avR elevations with CAD and hx of cbg. Given avR elevation possible LM disease. With concern for GIB, however rectal exam negative for blood. Trop T at [facility] 11.2. At [facility] Trop T .94, CKMB 33.2. However possible that hypovolemia 2/2 from the GIB is causing a demand ischemia leading to elevated cardiac enzymes. Cardiac enzymes continue to uptrend from .94 to 1.46 and CKMB 33.2 to 60.6 despite stable Hgb, no BMs and adequate perfusion which may point more towards a NSTEMI.   - Heparin gtt restarting due to thought of more likely NSTEMI  - Consulting interventional for catheterization   - ASA 81, Plavix 75 daily  - Initiated lipitor 40mg, AST likely elevated from NSTEMI or Type II MI, ALT just slightly elevated  - Continue to trend cardiac enzymes q4h       PULMONARY  #Pulmonary Edema  With B/L crackles, tachypnea, belly breathing on arrival. CXR w B/L pulmonary congestion. Bedside Echo with reduced EF per cards fellow. Not on diuretic at home. S/p lasix 40mg  - No requirement of diuresis at this time   - currently on 2L NC   - lewis in place for strict I/Os  - daily weights      NEURO  W baseline mental status AAOx2. Currently AAOx1 likely in the setting of cardiogenic shock   - c/w home meds donepezil, memantine     RENAL  At [facility] Cr 1.12, with Cr .99 on arrival  - monitor BMP     ENDO  #DM2   A1c 6.7 this admission. On metformin 500 bid at home.  - c/w MISS    GI  #GIB  With witnessed episode of melena, stool guiac positive at [facility] and received 1 u prbc, IV protonix 80 x 1 there. Pre transfusion hgb 10.4. With no additional episodes of melena, negative rectal exam. However with repeat hgb (post transfusion) at [facility] 9.8, tachycardia, hypotension, altered mental status. S/p 2 u pRBC  - GI consulted, per GI fellow, no plan for scope given poor hemodynamic status, plan for scope once patient is hemodynamically stable, of note, pt will need to be intubated for scope per GI  - Protonix 40mg IVP BID  - NPO except meds  - 3 PIVs, two 22g, one 18g    F - s/p 500cc NS en route to [facility]   E - replete prn  N - NPO    DVT PPx - heparin ggt (given NSTEMI)    LINES - 3 PIVs, two 22s, one 18g. RIJ place 2/9      CODE - FULL    DISPO - CCU 87M w pmh CAD, hx CABG approx 10 years ago, DM2, hernia, dementia (AAOx2 at baseline) presents for NSTEMI, cardiogenic shock, GIB admitted to CCU for further management.     PLAN:     CARDIOVASCULAR  #Cardiogenic shock  With known CAD, Hx of CABG. On arrival with BP 79/61, elevated lactate and LFTs, cool lower extremities, cool lower ext, altered mental status compared to baseline. CXR with pulmonary congestion. Bedside echo performed by cards fellow showing reduced EF. Darleen cardiac calculation off dobutamine drip w CO: 3.6, CI 2.1, SV 33 however SVO2 from VBG. Lactate downtrending 2.8-->2.1  - C/w dobutamine drip 5mcg/kg  - F/U ECHO  - F/u 4pm lactate     #NSTEMI vs type II MI  EKG w anterolateral lead ST depressions w avR elevations with CAD and hx of cbg. Given avR elevation possible LM disease. With concern for GIB, however rectal exam negative for blood. Trop T at [facility] 11.2. At [facility] Trop T .94, CKMB 33.2. However possible that hypovolemia 2/2 from the GIB is causing a demand ischemia leading to elevated cardiac enzymes. Cardiac enzymes continue to uptrend from .94 to 1.46 and CKMB 33.2 to 60.6 despite stable Hgb, no BMs and adequate perfusion which may point more towards a NSTEMI.   - Heparin gtt restarting due to thought of more likely NSTEMI  - Consulting interventional for catheterization   - ASA 81, Plavix 75 daily  - Initiated lipitor 40mg, AST likely elevated from NSTEMI or Type II MI, ALT just slightly elevated  - Continue to trend cardiac enzymes q4h       PULMONARY  #Pulmonary Edema  With B/L crackles, tachypnea, belly breathing on arrival. CXR w B/L pulmonary congestion. Bedside Echo with reduced EF per cards fellow. Not on diuretic at home. S/p lasix 40mg  - No requirement of diuresis at this time   - currently on 2L NC   - lewis in place for strict I/Os  - daily weights      NEURO  W baseline mental status AAOx2. Currently AAOx1 likely in the setting of cardiogenic shock   - c/w home meds donepezil, memantine     RENAL  At [facility] Cr 1.12, with Cr .99 on arrival  - monitor BMP     ENDO  #DM2   A1c 6.7 this admission. On metformin 500 bid at home.  - c/w MISS    GI  #GIB  With witnessed episode of melena, stool guiac positive at [facility] and received 1 u prbc, IV protonix 80 x 1 there. Pre transfusion hgb 10.4. With no additional episodes of melena, negative rectal exam. However with repeat hgb (post transfusion) at [facility] 9.8, tachycardia, hypotension, altered mental status. S/p 2 u pRBC. Hgb staying stable at mid 10s on 2/10.   - GI consulted, per GI fellow, no plan for scope given poor hemodynamic status, plan for scope once patient is hemodynamically stable, of note, pt will need to be intubated for scope per GI  - Protonix 40mg IVP BID  - NPO except meds  - F/u 4pm H&H  - 3 PIVs, two 22g, one 18g    F - None  E - replete prn  N - NPO    DVT PPx - heparin ggt (given NSTEMI)    LINES - 3 PIVs, two 22s, one 18g. RIJ place 2/9      CODE - FULL    DISPO - CCU

## 2020-02-10 NOTE — CONSULT NOTE ADULT - ASSESSMENT
**Incomplete Note**   87M w pmh CAD (on ASA and plavix), hx CABG (10 years ago), Type 2 DM, abdomianl hernia, dementia (AAOx2 at baseline) who presented to [place] with confusion and AMS according to HHA and wife. Pt transferred to [facility] and admitted to CCU for NSTEMI and possible cardiogenic shock.  GI consulted for melena and r/o Gastrointestinal bleed.     #Melena r/o Gastrointestinal Bleed  Pt with hx of CAD s/p CABG 10 years on ASA and Placix. Pt presented with confusion and at [place] patient had one episode of witnessed melena which was guiac positive. Hemoglobin at that time was 10.2 given 1 unit of PRBC.  Pt was found to have NSTEMI and possible cardiogenic shock (given hypotension and lactic acidosis) and transferred to [facility] CCU. Hgb here was 9.8 and was given an additional 1 unit of PRBC (total 2 units). **Incomplete Note**   87M w pmh CAD (on ASA and plavix), hx CABG (10 years ago), Type 2 DM, abdomianl hernia, dementia (AAOx2 at baseline) who presented to [place] with confusion and AMS according to HHA and wife. Pt transferred to [facility] and admitted to CCU for NSTEMI and possible cardiogenic shock.  GI consulted for melena and r/o Gastrointestinal bleed.     #Melena r/o Gastrointestinal Bleed  Pt with hx of CAD s/p CABG 10 years on ASA and Placix. Pt presented with confusion and at [place] patient had one episode of witnessed melena which was guiac positive. Hemoglobin at that time was 10.2 given 1 unit of PRBC.  Pt was found to have NSTEMI due to elevated trop I and EKG with RBBB and ST depressions in the inferiorlateral leads. Pt transferred to [facility] and admitted to CCU for NSTEMI and possible cardiogenic shock (given hypotension and lactic acidosis). Hgb here was 9.8 and was given an additional 1 unit of PRBC (total 2 units). Given concern for melena at OSH, hypotension, and anemia GI consulted for r/o gastrointestinal bleed. Pt currently not having active melena, Hgb uptrended to 10.7 after 1 unit, and is on dobutamine ggt for presumed cardiogenic shock.    - Maintain active T&S, large bore IV access  - Transfusion threshold per primary team  - PPI IV BID  - f/u repeat CBC and lactate  - pt is currently off A/C  - pt currently on dobutamine ggt, with uptrending trops, and lactic acidosis. Pt will need to be optimized from a cardiac standpoint before pursing endoscopy. **Incomplete Note**   87M w pmh CAD (on ASA and plavix), hx CABG (10 years ago), Type 2 DM, abdomianl hernia, dementia (AAOx2 at baseline) who presented to [place] with confusion and AMS according to HHA and wife. Pt transferred to [facility] and admitted to CCU for NSTEMI and possible cardiogenic shock.  GI consulted for melena and r/o Gastrointestinal bleed.     #Melena r/o Gastrointestinal Bleed  Pt with hx of CAD s/p CABG 10 years on ASA and Placix. Pt presented with confusion and at [place] patient had one episode of witnessed melena which was guiac positive. Hemoglobin at that time was 10.2 given 1 unit of PRBC.  Pt was found to have NSTEMI due to elevated trop I and EKG with RBBB and ST depressions in the inferiorlateral leads. Pt transferred to [facility] and admitted to CCU for NSTEMI and possible cardiogenic shock (given hypotension and lactic acidosis). Hgb here was 9.8 and was given an additional 1 unit of PRBC (total 2 units). Given concern for melena at OSH, hypotension, and anemia GI consulted for r/o gastrointestinal bleed. Pt currently not having active melena, Hgb uptrended to 10.7 after 1 unit, and is on dobutamine ggt for presumed cardiogenic shock.    - Maintain active T&S, large bore IV access  - Transfusion threshold per primary team  - PPI IV BID  - f/u repeat CBC and lactate  - pt is currently off A/C  - pt currently on dobutamine ggt, with uptrending trops, and lactic acidosis. Pt will need to be optimized from a cardiac standpoint before pursing endoscopy.     #Transaminitis   Pt with elevated liver enzymes on admission. Likely in the setting of poor perfusion evidenced by increased lactate and unlikely congestive hepatopathy due to pt mildly volume overloaded and closer to euvolemia.   - trend LFTs **Incomplete Note**   87M w pmh CAD (on ASA and plavix), hx CABG (10 years ago), Type 2 DM, abdomianl hernia, dementia (AAOx2 at baseline) who presented to [place] with confusion and AMS according to HHA and wife. Pt transferred to [facility] and admitted to CCU for NSTEMI and possible cardiogenic shock.  GI consulted for melena and r/o Gastrointestinal bleed.     #Melena r/o Gastrointestinal Bleed  Pt with hx of CAD s/p CABG 10 years on ASA and Placix. Pt presented with confusion and at [place] patient had one episode of witnessed melena which was guiac positive. Hemoglobin at that time was 10.2 given 1 unit of PRBC.  Pt was found to have NSTEMI due to elevated trop I and EKG with RBBB and ST depressions in the inferiorlateral leads. Pt transferred to [facility] and admitted to CCU for NSTEMI and possible cardiogenic shock (given hypotension and lactic acidosis). Hgb here was 9.8 and was given an additional 1 unit of PRBC (total 2 units). Given concern for melena at OSH, hypotension, and anemia GI consulted for r/o gastrointestinal bleed. Pt currently not having active melena, Hgb uptrended to 10.7 after 1 unit, and is on dobutamine ggt for presumed cardiogenic shock.    - Maintain active T&S, large bore IV access  - Transfusion threshold per primary team  - PPI IV BID  - trend Hgb, repeat lactate downtrending significantly  - noted that patient was restarted on heparin gtt, still without any recurrent melena episode  - pt currently on dobutamine ggt, with uptrending trops, and lactic acidosis. Pt will need to be optimized from a cardiac standpoint before pursing endoscopy.     #Transaminitis   Pt with elevated liver enzymes on admission. Likely in the setting of poor perfusion evidenced by increased lactate and unlikely congestive hepatopathy due to pt mildly volume overloaded and closer to euvolemia.   - trend LFTs

## 2020-02-10 NOTE — H&P ADULT - NSICDXPASTMEDICALHX_GEN_ALL_CORE_FT
PAST MEDICAL HISTORY:  CAD, multiple vessel     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     Dementia     DM2 (diabetes mellitus, type 2)

## 2020-02-10 NOTE — H&P ADULT - ATTENDING COMMENTS
Critical Care Attestation     I have administered 95 minutes of critical care to this patient not including education and procedures.    Pt is an 88 y/o man c CAD s/p CABG who p/w AMS, ?GIB, NSTEMI and shock.    Pt's BP 80s with melana documented at [place]. SBP resolved with IVF    CABG: LIMA --> LAD ('02)  SVG D1/OM2/PDA (occluded OM2, PDA)    ECG: nsr @ 86 c RBBB, 2-3 downsloping ST I, II, AVL, V4-V6, AVR elevation     afeb, , 79/61, 97% 2l NC    Hgb 9.8 --> 10.7 (s/p PRBC)  Plt 148    AST 99  ALT 55  Lac 6.5 --> 4.3 -->   Cr 0.99  Yady 1.17  CK MB 33.2    CXR: pulm edema B/L    - pt with acute systolic CHF now in cardiogenic shock  - cont dobutamine gtt, f/u lactate clearance  - check tte  - transduce CVP  - f/u c GI, pt prob having a type II MI from GI bleed if pt with melana. Will cont to f/u Hgb, if with melana consider scope  - if Hgb stable and pt still in shock, consider type I MI and cath  - pt also with dementia with agitation which can complicate support devices if need be  - cont lipitor 40mg  - hold other meds while in shock (BB/ACE)  - cont f/u Lac, BP, and Hgb

## 2020-02-10 NOTE — H&P ADULT - HISTORY OF PRESENT ILLNESS
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 On , pt was at home and became more confused HHA concerned for AMS, confusion at which point HHA called pt's wife. Upon arrival to home, wife determined patient was in fact more confused than his baseline and called 911. EMS arrived and brought pt to [facility].     Upon arrival at [facility] patient had one episode of witnessed melena, guiac positive. Hemoglobin at that time was 10.2. In addition, patient was found to have elevated trop I 11.2 and EKG with right RBBB, ST depressions in anterolateral leads.     At [facility] ekg concerning for NSTEMI w right bundle, ST depressions in anterolateral leads, Trop I 11.2     Labs at [facility]: pre transfusion, hgb 10.4   transfused 1 u prbcs, WBC 13, INR 1.17, BUN 31, Cr 1.12  , ALT 41, .6. CXR significant for L sided effusion, CT Head negative    Treatment at [facility]: given 1 u pRBCs, 1 gram cefepime 600mg clindamycin, hep ggt, Atrovent neb x 1 , IV pantoprazole 80 mg x 1. Pt was then transferred to [facility] for further management of NSTEMI and GIB.     En route to [facility], pt with hypotension with SBPs 80s for which 500cc NS bolus was given with improvement of SBPs to 100s.     On arrival to [facility] rectal exam was negative for blood. Patient AAOx1 and unable to participate in full ROS due to incoherence with speech.   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 On 2/9, pt was at home and became more confused HHA concerned for AMS, confusion at which point HHA called pt's wife. Upon arrival to home, wife determined patient was in fact more confused than his baseline and called 911. EMS arrived and brought pt to [facility].     Upon arrival at [facility] patient had one episode of witnessed melena, guiac positive. Hemoglobin at that time was 10.2. In addition, patient was found to have elevated trop I 11.2 and EKG with right RBBB, ST depressions in anterolateral leads.At [facility] ekg concerning for NSTEMI w right bundle, ST depressions in anterolateral leads, Trop I 11.2 En route to [facility], pt with hypotension with SBPs 80s for which 500cc NS bolus was given with improvement of SBPs to 100s.     On arrival to [facility] rectal exam was negative for blood. Patient AAOx1 and unable to participate in full ROS due to incoherence with speech.   Vitals: T 98.4, , BP 79/61, RR 23, SPO2 97 on 2L NC  Labs: WBCs 12.91 hgb 9.8, plt 148, BUN 24, Cr .99, AST 99, ALT 55Lactate 6.5, trop I .94, CKMB 33.2, INR 1.25. 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 On 2/9, pt was at home and became more confused HHA concerned for AMS, confusion at which point HHA called pt's wife. Upon arrival to home, wife determined patient was in fact more confused than his baseline and called 911. EMS arrived and brought pt to [facility].     Upon arrival at [facility] patient had one episode of witnessed melena, guiac positive. Hemoglobin at that time was 10.2. In addition, patient was found to have elevated trop I 11.2 and EKG with right RBBB, ST depressions in anterolateral leads.At [facility] ekg concerning for NSTEMI w right bundle, ST depressions in anterolateral leads, Trop T 11.2 En route to [facility], pt with hypotension with SBPs 80s for which 500cc NS bolus was given with improvement of SBPs to 100s.     On arrival to [facility] rectal exam was negative for blood. Patient AAOx1 and unable to participate in full ROS due to incoherence with speech.   Vitals: T 98.4, , BP 79/61, RR 23, SPO2 97 on 2L NC  Labs: WBCs 12.91 hgb 9.8, plt 148, BUN 24, Cr .99, AST 99, ALT 55Lactate 6.5, trop T .94, CKMB 33.2, INR 1.25. CXR: with pulmonary vascular congestion, no significant infiltrates

## 2020-02-10 NOTE — H&P ADULT - NSHPPHYSICALEXAM_GEN_ALL_CORE
VITAL SIGNS:  T(C): 36.5 (02-10-20 @ 06:30), Max: 36.9 (02-10-20 @ 04:37)  T(F): 97.7 (02-10-20 @ 06:30), Max: 98.4 (02-10-20 @ 04:37)  HR: 104 (02-10-20 @ 06:30) (102 - 111)  BP: 102/60 (02-10-20 @ 06:30) (79/61 - 106/64)  BP(mean): 77 (02-10-20 @ 06:30) (66 - 80)  RR: 22 (02-10-20 @ 06:30) (18 - 29)  SpO2: 100% (02-10-20 @ 06:30) (97% - 100%)  Wt(kg): --    PHYSICAL EXAM:    Constitutional: Elderly male in bed, lethargic, confused  Head: NC/AT  Eyes: PERRL, anicteric sclera  ENT: no nasal discharge; MMM  Neck: supple; no JVD or thyromegaly  Respiratory: Tachypneic, belly breathing. B/L crackles   Cardiac: +S1/S2; tachycardic; no M/R/G; PMI non-displaced  Gastrointestinal: abdomen soft, NT/ND; no rebound or guarding; +BSx4, old scar present   Genitourinary: lewis in place (placed 2/9)  Back: spine midline, no bony tenderness or step-offs; no CVAT B/L  Extremities: WWP, no clubbing or cyanosis; no peripheral edema  Musculoskeletal: no joint swelling  Vascular: 2+ radial, DP pulses B/L  Dermatologic: skin warm, dry and intact  Lymphatic: no submandibular or cervical LAD  Neurologic: AAOx1; CNII-XII grossly intact; no focal deficits  Psychiatric: agitated affect

## 2020-02-10 NOTE — PATIENT PROFILE ADULT - FUNCTIONAL SCREEN CURRENT LEVEL: DRESSING, MLM
Reason for Call:  Medication or medication refill:    Do you use a [facility]?  Name of the pharmacy and phone number for the current request:      [id number] - [place], [place] - [address]      Name of the medication requested: levothyroxine (SYNTHROID/LEVOTHROID) 100 MCG tablet    Other request: Pharmacy states they never received the script please send again as the pt has called them several times and they state they have never received.    Can we leave a detailed message on this number? NO    Phone number patient can be reached at: Cell number on file:    Telephone Information:   Mobile [phone]       Best Time: anytime    Call taken on 9/30/2019 at 9:39 AM by Lacey Camargo      
Rx resent to same pharmacy.      Stefany REEVES RN,BSN    
2 = assistive person

## 2020-02-10 NOTE — CONSULT NOTE ADULT - SUBJECTIVE AND OBJECTIVE BOX
GASTROENTEROLOGY CONSULT NOTE    HPI:  87M w pmh CAD (on ASA and plavix), hx CABG (10 years ago), Type 2 DM, abdomianl hernia, dementia (AAOx2 at baseline) who presented to [facility] with confusion and AMS according to HHA and wife. At [facility] patient had one episode of witnessed melena which was guiac positive. Hemoglobin at that time was 10.2. In addition, patient was found to have elevated trop I 11.2 and EKG with right RBBB, ST depressions in anterolateral leads. Pt was reportedly given IV Protonix 80mg once and 1 unit PRBC started on heparin ggt transferred to [facility] for presumed NSTEMI. En route to [facility] pt had  hypotension with SBPs 80s for which 500cc NS bolus was given with improvement of SBPs to 100s. On arrival to [facility] Rectal exam done was negative for hemorrhoids, fissures, blood, or stool. . Patient AAOx1   Vitals: T 98.4, , BP 79/61, RR 23, SPO2 97 on 2L NC  WBCs 12.91 hgb 9.8, plt 148, BUN 24, Cr .99, AST 99, ALT 55, Lactate 6.5, trop T .94, CKMB 33.2, INR 1.25.   Bedside echo done by cards fellow demonstrated a reduced EF  CXR: with pulmonary vascular congestion.   Pt was continued on heparin ggt, started on Dobutamine ggt for possible cardiogenic shock,  given IV Protonix 80mg x 1, started on protonix ggt, and given addiotinal unit of PRBC. Pt was admitted to CCU for presumed NSTEMI and cardiogenic shock. GI consulted for melena and r/o Gastrointestinal bleed.     This AM pt has not had any BMs here. Pt has not had any hx of GI bleed when primary team spoke to wife. Currently pt is AOx0-1 but following commands. This morning pt transitioned from protonix ggt to protonix IV BID, heparin ggt was turned off due to possibility of type 2 MI from volume loss.     Allergies    No Known Allergies    Intolerances      Home Medications:  Aspirin Enteric Coated 81 mg oral delayed release tablet: 1 tab(s) orally once a day (10 Feb 2020 04:45)  atorvastatin 20 mg oral tablet: 1 tab(s) orally once a day (10 Feb 2020 04:45)  donepezil 5 mg oral tablet: 1 tab(s) orally once a day (at bedtime) (10 Feb 2020 04:46)  dutasteride 0.5 mg oral capsule: 1 cap(s) orally once a day (10 Feb 2020 04:46)  enalapril 2.5 mg oral tablet: 1 tab(s) orally once a day (10 Feb 2020 04:47)  latanoprost 0.005% ophthalmic solution: 1 drop(s) to each affected eye once a day (in the evening) (10 Feb 2020 04:47)  memantine 10 mg oral tablet: 1 tab(s) orally 2 times a day (10 Feb 2020 04:47)  metFORMIN 500 mg oral tablet: 1 tab(s) orally 2 times a day (10 Feb 2020 04:48)  metoprolol succinate 25 mg oral tablet, extended release: 1 tab(s) orally once a day (10 Feb 2020 04:48)  Plavix 75 mg oral tablet: 1 tab(s) orally once a day (10 Feb 2020 04:46)  tamsulosin 0.4 mg oral capsule: 1 cap(s) orally once a day (10 Feb 2020 04:48)  tolterodine 4 mg oral capsule, extended release: 1 cap(s) orally once a day (10 Feb 2020 04:49)    MEDICATIONS:  MEDICATIONS  (STANDING):  chlorhexidine 2% Cloths 1 Application(s) Topical <User Schedule>  dextrose 5%. 1000 milliLiter(s) (50 mL/Hr) IV Continuous <Continuous>  dextrose 50% Injectable 12.5 Gram(s) IV Push once  dextrose 50% Injectable 25 Gram(s) IV Push once  dextrose 50% Injectable 25 Gram(s) IV Push once  DOBUTamine Infusion 5 MICROgram(s)/kG/Min (10.23 mL/Hr) IV Continuous <Continuous>  donepezil 5 milliGRAM(s) Oral at bedtime  finasteride 5 milliGRAM(s) Oral daily  insulin lispro (HumaLOG) corrective regimen sliding scale   SubCutaneous every 6 hours  latanoprost 0.005% Ophthalmic Solution 1 Drop(s) Right EYE at bedtime  memantine 10 milliGRAM(s) Oral two times a day  pantoprazole  Injectable 40 milliGRAM(s) IV Push every 12 hours    MEDICATIONS  (PRN):  dextrose 40% Gel 15 Gram(s) Oral once PRN Blood Glucose LESS THAN 70 milliGRAM(s)/deciliter  glucagon  Injectable 1 milliGRAM(s) IntraMuscular once PRN Glucose LESS THAN 70 milligrams/deciliter    PAST MEDICAL & SURGICAL HISTORY:  Dementia  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 No nausea, vomiting, or hematemesis; No diarrhea or constipation. No melena or hematochezia.  GENITOURINARY: No dysuria, frequency or hematuria  NEUROLOGICAL: No numbness or weakness  SKIN: No itching, burning, rashes, or lesions   All other 10 review of systems is negative unless indicated above.    Vital Signs Last 24 Hrs  T(C): 36.5 (10 Feb 2020 06:30), Max: 36.9 (10 Feb 2020 04:37)  T(F): 97.7 (10 Feb 2020 06:30), Max: 98.4 (10 Feb 2020 04:37)  HR: 99 (10 Feb 2020 09:00) (99 - 111)  BP: 100/58 (10 Feb 2020 09:00) (79/61 - 109/66)  BP(mean): 74 (10 Feb 2020 09:00) (66 - 80)  RR: 21 (10 Feb 2020 09:00) (18 - 29)  SpO2: 98% (10 Feb 2020 09:00) (97% - 100%)    02-09 @ 07:01  -  02-10 @ 07:00  --------------------------------------------------------  IN: 621.8 mL / OUT: 1575 mL / NET: -953.2 mL    02-10 @ 07:01  -  02-10 @ 10:01  --------------------------------------------------------  IN: 39.4 mL / OUT: 675 mL / NET: -635.6 mL        PHYSICAL EXAM:    General: Well developed; well nourished; in no acute distress  Eyes: Anicteric sclerae, moist conjunctivae  HENT: Moist mucous membranes  Neck: Trachea midline, supple  Lungs: Normal respiratory effort, no intercostal retractions  Cardiovascular: RRR  Abdomen: Soft, non-tender non-distended; Normal bowel sounds; No rebound or guarding  Extremities: Normal range of motion, No clubbing, cyanosis or edema  Neurological: Alert and oriented x3  Skin: Warm and dry. No obvious rash    LABS:                        10.7   11.54 )-----------( 145      ( 10 Feb 2020 07:40 )             32.2     02-10    141  |  110<H>  |  22  ----------------------------<  174<H>  4.7   |  18<L>  |  1.02    Ca    8.4      10 Feb 2020 07:40  Phos  3.5     02-10  Mg     2.0     02-10    TPro  5.9<L>  /  Alb  3.5  /  TBili  0.4  /  DBili  x   /  AST  101<H>  /  ALT  49<H>  /  AlkPhos  52  02-10        PT/INR - ( 10 Feb 2020 02:19 )   PT: 14.3 sec;   INR: 1.25          PTT - ( 10 Feb 2020 02:19 )  PTT:37.2 sec    RADIOLOGY & ADDITIONAL STUDIES:     Reviewed GASTROENTEROLOGY CONSULT NOTE    HPI:  87M w pmh CAD (on ASA and plavix), hx CABG (10 years ago), Type 2 DM, abdomianl hernia, dementia (AAOx2 at baseline) who presented to [place] with confusion and AMS according to HHA and wife. At [place] patient had one episode of witnessed melena which was guiac positive. Hemoglobin at that time was 10.2. In addition, patient was found to have elevated trop I 11.2 and EKG with right RBBB, ST depressions in anterolateral leads. Pt was reportedly given IV Protonix 80mg once and 1 unit PRBC started on heparin ggt transferred to [facility] for presumed NSTEMI. En route to [facility] pt had  hypotension with SBPs 80s for which 500cc NS bolus was given with improvement of SBPs to 100s. On arrival to [facility] Rectal exam done was negative for hemorrhoids, fissures, blood, or stool. . Patient AAOx1   Vitals: T 98.4, , BP 79/61, RR 23, SPO2 97 on 2L NC  WBCs 12.91 hgb 9.8, plt 148, BUN 24, Cr .99, AST 99, ALT 55, Lactate 6.5, trop T .94, CKMB 33.2, INR 1.25.   Bedside echo done by cards fellow demonstrated a reduced EF  CXR: with pulmonary vascular congestion.   Pt was continued on heparin ggt, started on Dobutamine ggt for possible cardiogenic shock,  given IV Protonix 80mg x 1, started on protonix ggt, and given addiotinal unit of PRBC. Pt was admitted to CCU for presumed NSTEMI and cardiogenic shock. GI consulted for melena and r/o Gastrointestinal bleed.     This AM pt has not had any BMs here. Pt has not had any hx of GI bleed when primary team spoke to wife. Currently pt is AOx0-1 but following commands. This morning pt transitioned from protonix ggt to protonix IV BID, heparin ggt was turned off due to possibility of type 2 MI from volume loss.     Allergies    No Known Allergies    Intolerances      Home Medications:  Aspirin Enteric Coated 81 mg oral delayed release tablet: 1 tab(s) orally once a day (10 Feb 2020 04:45)  atorvastatin 20 mg oral tablet: 1 tab(s) orally once a day (10 Feb 2020 04:45)  donepezil 5 mg oral tablet: 1 tab(s) orally once a day (at bedtime) (10 Feb 2020 04:46)  dutasteride 0.5 mg oral capsule: 1 cap(s) orally once a day (10 Feb 2020 04:46)  enalapril 2.5 mg oral tablet: 1 tab(s) orally once a day (10 Feb 2020 04:47)  latanoprost 0.005% ophthalmic solution: 1 drop(s) to each affected eye once a day (in the evening) (10 Feb 2020 04:47)  memantine 10 mg oral tablet: 1 tab(s) orally 2 times a day (10 Feb 2020 04:47)  metFORMIN 500 mg oral tablet: 1 tab(s) orally 2 times a day (10 Feb 2020 04:48)  metoprolol succinate 25 mg oral tablet, extended release: 1 tab(s) orally once a day (10 Feb 2020 04:48)  Plavix 75 mg oral tablet: 1 tab(s) orally once a day (10 Feb 2020 04:46)  tamsulosin 0.4 mg oral capsule: 1 cap(s) orally once a day (10 Feb 2020 04:48)  tolterodine 4 mg oral capsule, extended release: 1 cap(s) orally once a day (10 Feb 2020 04:49)    MEDICATIONS:  MEDICATIONS  (STANDING):  chlorhexidine 2% Cloths 1 Application(s) Topical <User Schedule>  dextrose 5%. 1000 milliLiter(s) (50 mL/Hr) IV Continuous <Continuous>  dextrose 50% Injectable 12.5 Gram(s) IV Push once  dextrose 50% Injectable 25 Gram(s) IV Push once  dextrose 50% Injectable 25 Gram(s) IV Push once  DOBUTamine Infusion 5 MICROgram(s)/kG/Min (10.23 mL/Hr) IV Continuous <Continuous>  donepezil 5 milliGRAM(s) Oral at bedtime  finasteride 5 milliGRAM(s) Oral daily  insulin lispro (HumaLOG) corrective regimen sliding scale   SubCutaneous every 6 hours  latanoprost 0.005% Ophthalmic Solution 1 Drop(s) Right EYE at bedtime  memantine 10 milliGRAM(s) Oral two times a day  pantoprazole  Injectable 40 milliGRAM(s) IV Push every 12 hours    MEDICATIONS  (PRN):  dextrose 40% Gel 15 Gram(s) Oral once PRN Blood Glucose LESS THAN 70 milliGRAM(s)/deciliter  glucagon  Injectable 1 milliGRAM(s) IntraMuscular once PRN Glucose LESS THAN 70 milligrams/deciliter    PAST MEDICAL & SURGICAL HISTORY:  Dementia  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  DM2 (diabetes mellitus, type 2)  CAD, multiple vessel  H/O hernia repair  S/P CABG (coronary artery bypass graft)    FAMILY HISTORY:    SOCIAL HISTORY:  Tobacoo: denies  Alcohol: denies  Illicit Drugs: denies     REVIEW OF SYSTEMS:  unable to obtain due to neurocognitive status   Vital Signs Last 24 Hrs  T(C): 36.5 (10 Feb 2020 06:30), Max: 36.9 (10 Feb 2020 04:37)  T(F): 97.7 (10 Feb 2020 06:30), Max: 98.4 (10 Feb 2020 04:37)  HR: 99 (10 Feb 2020 09:00) (99 - 111)  BP: 100/58 (10 Feb 2020 09:00) (79/61 - 109/66)  BP(mean): 74 (10 Feb 2020 09:00) (66 - 80)  RR: 21 (10 Feb 2020 09:00) (18 - 29)  SpO2: 98% (10 Feb 2020 09:00) (97% - 100%)    02-09 @ 07:01  -  02-10 @ 07:00  --------------------------------------------------------  IN: 621.8 mL / OUT: 1575 mL / NET: -953.2 mL    02-10 @ 07:01  -  02-10 @ 10:01  --------------------------------------------------------  IN: 39.4 mL / OUT: 675 mL / NET: -635.6 mL        PHYSICAL EXAM:    General: Well developed; well nourished; in no acute distress  Eyes: Anicteric sclerae, moist conjunctivae, no conjunctival pallor   HENT: dry  mucous membranes  Neck: Trachea midline, supple  Lungs: Normal respiratory effort, no intercostal retractions  Cardiovascular: RRR  Abdomen healed incision scar around to umbilicus from prior surgery, normoactive BS, no tenderness to light or deep palpation in any of the quadrants, no hepatosplenomegaly, no guarding or rigidity.   Extremities: trace pitting edema of LE bilaterally   Neurological: Alert and oriented x3  Skin: Warm and dry. No obvious rash    LABS:                        10.7   11.54 )-----------( 145      ( 10 Feb 2020 07:40 )             32.2     02-10    141  |  110<H>  |  22  ----------------------------<  174<H>  4.7   |  18<L>  |  1.02    Ca    8.4      10 Feb 2020 07:40  Phos  3.5     02-10  Mg     2.0     02-10    TPro  5.9<L>  /  Alb  3.5  /  TBili  0.4  /  DBili  x   /  AST  101<H>  /  ALT  49<H>  /  AlkPhos  52  02-10        PT/INR - ( 10 Feb 2020 02:19 )   PT: 14.3 sec;   INR: 1.25          PTT - ( 10 Feb 2020 02:19 )  PTT:37.2 sec    RADIOLOGY & ADDITIONAL STUDIES:     Reviewed GASTROENTEROLOGY CONSULT NOTE    HPI:  87M w pmh CAD (on ASA and plavix), hx CABG (10 years ago), Type 2 DM, abdomianl hernia, dementia (AAOx2 at baseline) who presented to [place] with confusion and AMS according to HHA and wife. At [place] patient had one episode of witnessed melena which was guiac positive. Hemoglobin at that time was 10.2. In addition, patient was found to have elevated trop I 11.2 and EKG with right RBBB, ST depressions in anterolateral leads. Pt was reportedly given IV Protonix 80mg once and 1 unit PRBC started on heparin ggt transferred to [facility] for presumed NSTEMI. En route to [facility] pt had  hypotension with SBPs 80s for which 500cc NS bolus was given with improvement of SBPs to 100s. On arrival to [facility] Rectal exam done was negative for hemorrhoids, fissures, blood, or stool. . Patient AAOx1   Vitals: T 98.4, , BP 79/61, RR 23, SPO2 97 on 2L NC, Labs remarkable for 12.91 hgb 9.8, plt 148, BUN 24, Cr .99, AST 99, ALT 55, Lactate 6.5, trop T .94, CKMB 33.2, INR 1.25. CXR with pulmonary vascular congestion and bedside echo done by cards fellow demonstrated a reduced EF Pt was continued on heparin ggt, started on Dobutamine ggt for possible cardiogenic shock,  given IV Protonix 80mg x 1, started on protonix ggt, and given addiotinal unit of PRBC. Pt was admitted to CCU for presumed NSTEMI and cardiogenic shock. GI consulted for melena and r/o Gastrointestinal bleed.     This AM pt states he is in no pain but unable to obtain full ROS due to neurocognitive status. According to primary team and nursing staff pt has not had any BMs since his admission here. Pt has not had any prior hx of GI bleed when primary team spoke to wife. Currently pt is AOx0-1 but following commands. This morning pt transitioned from protonix ggt to protonix IV BID, heparin ggt was turned off due to possibility of type 2 MI from volume loss.     Allergies    No Known Allergies    Intolerances      Home Medications:  Aspirin Enteric Coated 81 mg oral delayed release tablet: 1 tab(s) orally once a day (10 Feb 2020 04:45)  atorvastatin 20 mg oral tablet: 1 tab(s) orally once a day (10 Feb 2020 04:45)  donepezil 5 mg oral tablet: 1 tab(s) orally once a day (at bedtime) (10 Feb 2020 04:46)  dutasteride 0.5 mg oral capsule: 1 cap(s) orally once a day (10 Feb 2020 04:46)  enalapril 2.5 mg oral tablet: 1 tab(s) orally once a day (10 Feb 2020 04:47)  latanoprost 0.005% ophthalmic solution: 1 drop(s) to each affected eye once a day (in the evening) (10 Feb 2020 04:47)  memantine 10 mg oral tablet: 1 tab(s) orally 2 times a day (10 Feb 2020 04:47)  metFORMIN 500 mg oral tablet: 1 tab(s) orally 2 times a day (10 Feb 2020 04:48)  metoprolol succinate 25 mg oral tablet, extended release: 1 tab(s) orally once a day (10 Feb 2020 04:48)  Plavix 75 mg oral tablet: 1 tab(s) orally once a day (10 Feb 2020 04:46)  tamsulosin 0.4 mg oral capsule: 1 cap(s) orally once a day (10 Feb 2020 04:48)  tolterodine 4 mg oral capsule, extended release: 1 cap(s) orally once a day (10 Feb 2020 04:49)    MEDICATIONS:  MEDICATIONS  (STANDING):  chlorhexidine 2% Cloths 1 Application(s) Topical <User Schedule>  dextrose 5%. 1000 milliLiter(s) (50 mL/Hr) IV Continuous <Continuous>  dextrose 50% Injectable 12.5 Gram(s) IV Push once  dextrose 50% Injectable 25 Gram(s) IV Push once  dextrose 50% Injectable 25 Gram(s) IV Push once  DOBUTamine Infusion 5 MICROgram(s)/kG/Min (10.23 mL/Hr) IV Continuous <Continuous>  donepezil 5 milliGRAM(s) Oral at bedtime  finasteride 5 milliGRAM(s) Oral daily  insulin lispro (HumaLOG) corrective regimen sliding scale   SubCutaneous every 6 hours  latanoprost 0.005% Ophthalmic Solution 1 Drop(s) Right EYE at bedtime  memantine 10 milliGRAM(s) Oral two times a day  pantoprazole  Injectable 40 milliGRAM(s) IV Push every 12 hours    MEDICATIONS  (PRN):  dextrose 40% Gel 15 Gram(s) Oral once PRN Blood Glucose LESS THAN 70 milliGRAM(s)/deciliter  glucagon  Injectable 1 milliGRAM(s) IntraMuscular once PRN Glucose LESS THAN 70 milligrams/deciliter    PAST MEDICAL & SURGICAL HISTORY:  Dementia  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  DM2 (diabetes mellitus, type 2)  CAD, multiple vessel  H/O hernia repair  S/P CABG (coronary artery bypass graft)    FAMILY HISTORY:    SOCIAL HISTORY:  Tobacoo: denies  Alcohol: denies  Illicit Drugs: denies     REVIEW OF SYSTEMS:  unable to obtain due to neurocognitive status   Vital Signs Last 24 Hrs  T(C): 36.5 (10 Feb 2020 06:30), Max: 36.9 (10 Feb 2020 04:37)  T(F): 97.7 (10 Feb 2020 06:30), Max: 98.4 (10 Feb 2020 04:37)  HR: 99 (10 Feb 2020 09:00) (99 - 111)  BP: 100/58 (10 Feb 2020 09:00) (79/61 - 109/66)  BP(mean): 74 (10 Feb 2020 09:00) (66 - 80)  RR: 21 (10 Feb 2020 09:00) (18 - 29)  SpO2: 98% (10 Feb 2020 09:00) (97% - 100%)    02-09 @ 07:01  -  02-10 @ 07:00  --------------------------------------------------------  IN: 621.8 mL / OUT: 1575 mL / NET: -953.2 mL    02-10 @ 07:01  -  02-10 @ 10:01  --------------------------------------------------------  IN: 39.4 mL / OUT: 675 mL / NET: -635.6 mL        PHYSICAL EXAM:    General: Well developed; well nourished; in no acute distress  Eyes: Anicteric sclerae, moist conjunctivae, no conjunctival pallor   HENT: dry  mucous membranes  Neck: Trachea midline, supple  Lungs: Normal respiratory effort, no intercostal retractions  Cardiovascular: RRR  Abdomen healed incision scar around to umbilicus from prior surgery, normoactive BS, no tenderness to light or deep palpation in any of the quadrants, no hepatosplenomegaly, no guarding or rigidity.   Extremities: trace pitting edema of LE bilaterally   Neurological: Alert and oriented x3  Skin: Warm and dry. No obvious rash    LABS:                        10.7   11.54 )-----------( 145      ( 10 Feb 2020 07:40 )             32.2     02-10    141  |  110<H>  |  22  ----------------------------<  174<H>  4.7   |  18<L>  |  1.02    Ca    8.4      10 Feb 2020 07:40  Phos  3.5     02-10  Mg     2.0     02-10    TPro  5.9<L>  /  Alb  3.5  /  TBili  0.4  /  DBili  x   /  AST  101<H>  /  ALT  49<H>  /  AlkPhos  52  02-10        PT/INR - ( 10 Feb 2020 02:19 )   PT: 14.3 sec;   INR: 1.25          PTT - ( 10 Feb 2020 02:19 )  PTT:37.2 sec    RADIOLOGY & ADDITIONAL STUDIES:     Reviewed

## 2020-02-10 NOTE — H&P ADULT - NSHPLABSRESULTS_GEN_ALL_CORE
LABS:                         9.8    12.91 )-----------( 148      ( 10 Feb 2020 02:19 )             31.0     02-10    141  |  110<H>  |  24<H>  ----------------------------<  231<H>  5.0   |  16<L>  |  0.99    Ca    8.6      10 Feb 2020 02:19  Phos  3.4     02-10  Mg     2.0     02-10    TPro  5.6<L>  /  Alb  3.0<L>  /  TBili  0.3  /  DBili  x   /  AST  99<H>  /  ALT  55<H>  /  AlkPhos  52  02-10    PT/INR - ( 10 Feb 2020 02:19 )   PT: 14.3 sec;   INR: 1.25          PTT - ( 10 Feb 2020 02:19 )  PTT:37.2 sec    CARDIAC MARKERS ( 10 Feb 2020 02:19 )  x     / 0.94 ng/mL / 496 U/L / x     / 33.2 ng/mL      Serum Pro-Brain Natriuretic Peptide: 918 pg/mL (02-10 @ 02:19)    Lactate, Blood: 4.4 mmol/L (02-10 @ 04:08)  Lactate, Blood: 6.5 mmol/L (02-10 @ 02:14)      RADIOLOGY, EKG & ADDITIONAL TESTS: Reviewed.

## 2020-02-11 LAB
ALBUMIN SERPL ELPH-MCNC: 2.3 G/DL — LOW (ref 3.3–5)
ALBUMIN SERPL ELPH-MCNC: 2.8 G/DL — LOW (ref 3.3–5)
ALBUMIN SERPL ELPH-MCNC: 3.1 G/DL — LOW (ref 3.3–5)
ALP SERPL-CCNC: 37 U/L — LOW (ref 40–120)
ALP SERPL-CCNC: 46 U/L — SIGNIFICANT CHANGE UP (ref 40–120)
ALP SERPL-CCNC: 46 U/L — SIGNIFICANT CHANGE UP (ref 40–120)
ALT FLD-CCNC: 31 U/L — SIGNIFICANT CHANGE UP (ref 10–45)
ALT FLD-CCNC: 42 U/L — SIGNIFICANT CHANGE UP (ref 10–45)
ALT FLD-CCNC: 42 U/L — SIGNIFICANT CHANGE UP (ref 10–45)
ANION GAP SERPL CALC-SCNC: 11 MMOL/L — SIGNIFICANT CHANGE UP (ref 5–17)
ANION GAP SERPL CALC-SCNC: 14 MMOL/L — SIGNIFICANT CHANGE UP (ref 5–17)
ANION GAP SERPL CALC-SCNC: 14 MMOL/L — SIGNIFICANT CHANGE UP (ref 5–17)
APTT BLD: 80.8 SEC — HIGH (ref 27.5–36.3)
APTT BLD: 80.9 SEC — HIGH (ref 27.5–36.3)
AST SERPL-CCNC: 50 U/L — HIGH (ref 10–40)
AST SERPL-CCNC: 92 U/L — HIGH (ref 10–40)
AST SERPL-CCNC: 99 U/L — HIGH (ref 10–40)
BASE EXCESS BLDA CALC-SCNC: -0.9 MMOL/L — SIGNIFICANT CHANGE UP (ref -2–3)
BASE EXCESS BLDA CALC-SCNC: 0.5 MMOL/L — SIGNIFICANT CHANGE UP (ref -2–3)
BASOPHILS # BLD AUTO: 0 K/UL — SIGNIFICANT CHANGE UP (ref 0–0.2)
BASOPHILS NFR BLD AUTO: 0 % — SIGNIFICANT CHANGE UP (ref 0–2)
BILIRUB SERPL-MCNC: 0.4 MG/DL — SIGNIFICANT CHANGE UP (ref 0.2–1.2)
BILIRUB SERPL-MCNC: 0.4 MG/DL — SIGNIFICANT CHANGE UP (ref 0.2–1.2)
BILIRUB SERPL-MCNC: 0.5 MG/DL — SIGNIFICANT CHANGE UP (ref 0.2–1.2)
BUN SERPL-MCNC: 28 MG/DL — HIGH (ref 7–23)
BUN SERPL-MCNC: 31 MG/DL — HIGH (ref 7–23)
BUN SERPL-MCNC: 31 MG/DL — HIGH (ref 7–23)
CALCIUM SERPL-MCNC: 7.2 MG/DL — LOW (ref 8.4–10.5)
CALCIUM SERPL-MCNC: 8.1 MG/DL — LOW (ref 8.4–10.5)
CALCIUM SERPL-MCNC: 8.2 MG/DL — LOW (ref 8.4–10.5)
CHLORIDE SERPL-SCNC: 107 MMOL/L — SIGNIFICANT CHANGE UP (ref 96–108)
CHLORIDE SERPL-SCNC: 107 MMOL/L — SIGNIFICANT CHANGE UP (ref 96–108)
CHLORIDE SERPL-SCNC: 109 MMOL/L — HIGH (ref 96–108)
CK MB CFR SERPL CALC: 17.9 NG/ML — HIGH (ref 0–6.7)
CK MB CFR SERPL CALC: 23.9 NG/ML — HIGH (ref 0–6.7)
CK MB CFR SERPL CALC: 32.1 NG/ML — HIGH (ref 0–6.7)
CK MB CFR SERPL CALC: 43.5 NG/ML — HIGH (ref 0–6.7)
CK SERPL-CCNC: 1007 U/L — HIGH (ref 30–200)
CK SERPL-CCNC: 709 U/L — HIGH (ref 30–200)
CK SERPL-CCNC: 882 U/L — HIGH (ref 30–200)
CK SERPL-CCNC: 969 U/L — HIGH (ref 30–200)
CO2 SERPL-SCNC: 18 MMOL/L — LOW (ref 22–31)
CO2 SERPL-SCNC: 21 MMOL/L — LOW (ref 22–31)
CO2 SERPL-SCNC: 21 MMOL/L — LOW (ref 22–31)
CREAT SERPL-MCNC: 1.01 MG/DL — SIGNIFICANT CHANGE UP (ref 0.5–1.3)
CREAT SERPL-MCNC: 1.18 MG/DL — SIGNIFICANT CHANGE UP (ref 0.5–1.3)
CREAT SERPL-MCNC: 1.24 MG/DL — SIGNIFICANT CHANGE UP (ref 0.5–1.3)
EOSINOPHIL # BLD AUTO: 0 K/UL — SIGNIFICANT CHANGE UP (ref 0–0.5)
EOSINOPHIL NFR BLD AUTO: 0 % — SIGNIFICANT CHANGE UP (ref 0–6)
GLUCOSE SERPL-MCNC: 138 MG/DL — HIGH (ref 70–99)
GLUCOSE SERPL-MCNC: 200 MG/DL — HIGH (ref 70–99)
GLUCOSE SERPL-MCNC: 214 MG/DL — HIGH (ref 70–99)
HCO3 BLDA-SCNC: 22 MMOL/L — SIGNIFICANT CHANGE UP (ref 21–28)
HCO3 BLDA-SCNC: 23 MMOL/L — SIGNIFICANT CHANGE UP (ref 21–28)
HCT VFR BLD CALC: 27 % — LOW (ref 39–50)
HCT VFR BLD CALC: 27 % — LOW (ref 39–50)
HCT VFR BLD CALC: 27.3 % — LOW (ref 39–50)
HCT VFR BLD CALC: 28.9 % — LOW (ref 39–50)
HCT VFR BLD CALC: 30.3 % — LOW (ref 39–50)
HGB BLD-MCNC: 9 G/DL — LOW (ref 13–17)
HGB BLD-MCNC: 9 G/DL — LOW (ref 13–17)
HGB BLD-MCNC: 9.1 G/DL — LOW (ref 13–17)
HGB BLD-MCNC: 9.8 G/DL — LOW (ref 13–17)
HGB BLD-MCNC: 9.8 G/DL — LOW (ref 13–17)
INR BLD: 1.5 — HIGH (ref 0.88–1.16)
LACTATE SERPL-SCNC: 1.9 MMOL/L — SIGNIFICANT CHANGE UP (ref 0.5–2)
LACTATE SERPL-SCNC: 2.3 MMOL/L — HIGH (ref 0.5–2)
LACTATE SERPL-SCNC: 2.8 MMOL/L — HIGH (ref 0.5–2)
LACTATE SERPL-SCNC: 2.9 MMOL/L — HIGH (ref 0.5–2)
LACTATE SERPL-SCNC: 3.2 MMOL/L — HIGH (ref 0.5–2)
LYMPHOCYTES # BLD AUTO: 18.4 % — SIGNIFICANT CHANGE UP (ref 13–44)
LYMPHOCYTES # BLD AUTO: 2.72 K/UL — SIGNIFICANT CHANGE UP (ref 1–3.3)
MAGNESIUM SERPL-MCNC: 2.2 MG/DL — SIGNIFICANT CHANGE UP (ref 1.6–2.6)
MCHC RBC-ENTMCNC: 30.1 PG — SIGNIFICANT CHANGE UP (ref 27–34)
MCHC RBC-ENTMCNC: 30.6 PG — SIGNIFICANT CHANGE UP (ref 27–34)
MCHC RBC-ENTMCNC: 31.3 PG — SIGNIFICANT CHANGE UP (ref 27–34)
MCHC RBC-ENTMCNC: 31.4 PG — SIGNIFICANT CHANGE UP (ref 27–34)
MCHC RBC-ENTMCNC: 31.7 PG — SIGNIFICANT CHANGE UP (ref 27–34)
MCHC RBC-ENTMCNC: 32.3 GM/DL — SIGNIFICANT CHANGE UP (ref 32–36)
MCHC RBC-ENTMCNC: 33.3 GM/DL — SIGNIFICANT CHANGE UP (ref 32–36)
MCHC RBC-ENTMCNC: 33.9 GM/DL — SIGNIFICANT CHANGE UP (ref 32–36)
MCV RBC AUTO: 91.8 FL — SIGNIFICANT CHANGE UP (ref 80–100)
MCV RBC AUTO: 92.9 FL — SIGNIFICANT CHANGE UP (ref 80–100)
MCV RBC AUTO: 93.5 FL — SIGNIFICANT CHANGE UP (ref 80–100)
MCV RBC AUTO: 93.8 FL — SIGNIFICANT CHANGE UP (ref 80–100)
MCV RBC AUTO: 94.1 FL — SIGNIFICANT CHANGE UP (ref 80–100)
MONOCYTES # BLD AUTO: 0.78 K/UL — SIGNIFICANT CHANGE UP (ref 0–0.9)
MONOCYTES NFR BLD AUTO: 5.3 % — SIGNIFICANT CHANGE UP (ref 2–14)
NEUTROPHILS # BLD AUTO: 11.28 K/UL — HIGH (ref 1.8–7.4)
NEUTROPHILS NFR BLD AUTO: 76.3 % — SIGNIFICANT CHANGE UP (ref 43–77)
NRBC # BLD: 0 /100 WBCS — SIGNIFICANT CHANGE UP (ref 0–0)
PCO2 BLDA: 29 MMHG — LOW (ref 35–48)
PCO2 BLDA: 30 MMHG — LOW (ref 35–48)
PH BLDA: 7.48 — HIGH (ref 7.35–7.45)
PH BLDA: 7.52 — HIGH (ref 7.35–7.45)
PHOSPHATE SERPL-MCNC: 1.6 MG/DL — LOW (ref 2.5–4.5)
PLATELET # BLD AUTO: 125 K/UL — LOW (ref 150–400)
PLATELET # BLD AUTO: 138 K/UL — LOW (ref 150–400)
PLATELET # BLD AUTO: 157 K/UL — SIGNIFICANT CHANGE UP (ref 150–400)
PLATELET # BLD AUTO: 158 K/UL — SIGNIFICANT CHANGE UP (ref 150–400)
PLATELET # BLD AUTO: 161 K/UL — SIGNIFICANT CHANGE UP (ref 150–400)
PO2 BLDA: 147 MMHG — HIGH (ref 83–108)
PO2 BLDA: 200 MMHG — HIGH (ref 83–108)
POTASSIUM SERPL-MCNC: 3.7 MMOL/L — SIGNIFICANT CHANGE UP (ref 3.5–5.3)
POTASSIUM SERPL-MCNC: 3.8 MMOL/L — SIGNIFICANT CHANGE UP (ref 3.5–5.3)
POTASSIUM SERPL-MCNC: 3.9 MMOL/L — SIGNIFICANT CHANGE UP (ref 3.5–5.3)
POTASSIUM SERPL-SCNC: 3.7 MMOL/L — SIGNIFICANT CHANGE UP (ref 3.5–5.3)
POTASSIUM SERPL-SCNC: 3.8 MMOL/L — SIGNIFICANT CHANGE UP (ref 3.5–5.3)
POTASSIUM SERPL-SCNC: 3.9 MMOL/L — SIGNIFICANT CHANGE UP (ref 3.5–5.3)
PROT SERPL-MCNC: 4.6 G/DL — LOW (ref 6–8.3)
PROT SERPL-MCNC: 5.4 G/DL — LOW (ref 6–8.3)
PROT SERPL-MCNC: 5.6 G/DL — LOW (ref 6–8.3)
PROTHROM AB SERPL-ACNC: 17.3 SEC — HIGH (ref 10–12.9)
RBC # BLD: 2.88 M/UL — LOW (ref 4.2–5.8)
RBC # BLD: 2.9 M/UL — LOW (ref 4.2–5.8)
RBC # BLD: 2.94 M/UL — LOW (ref 4.2–5.8)
RBC # BLD: 3.09 M/UL — LOW (ref 4.2–5.8)
RBC # BLD: 3.26 M/UL — LOW (ref 4.2–5.8)
RBC # FLD: 15.2 % — HIGH (ref 10.3–14.5)
RBC # FLD: 15.2 % — HIGH (ref 10.3–14.5)
RBC # FLD: 15.4 % — HIGH (ref 10.3–14.5)
RBC # FLD: 16.5 % — HIGH (ref 10.3–14.5)
RBC # FLD: 16.8 % — HIGH (ref 10.3–14.5)
SAO2 % BLDA: 99 % — SIGNIFICANT CHANGE UP (ref 95–100)
SAO2 % BLDA: 99 % — SIGNIFICANT CHANGE UP (ref 95–100)
SODIUM SERPL-SCNC: 138 MMOL/L — SIGNIFICANT CHANGE UP (ref 135–145)
SODIUM SERPL-SCNC: 142 MMOL/L — SIGNIFICANT CHANGE UP (ref 135–145)
SODIUM SERPL-SCNC: 142 MMOL/L — SIGNIFICANT CHANGE UP (ref 135–145)
TROPONIN T SERPL-MCNC: 2.12 NG/ML — CRITICAL HIGH (ref 0–0.01)
TROPONIN T SERPL-MCNC: 2.3 NG/ML — CRITICAL HIGH (ref 0–0.01)
TROPONIN T SERPL-MCNC: 2.51 NG/ML — CRITICAL HIGH (ref 0–0.01)
TROPONIN T SERPL-MCNC: 2.72 NG/ML — CRITICAL HIGH (ref 0–0.01)
WBC # BLD: 13.99 K/UL — HIGH (ref 3.8–10.5)
WBC # BLD: 14.58 K/UL — HIGH (ref 3.8–10.5)
WBC # BLD: 14.79 K/UL — HIGH (ref 3.8–10.5)
WBC # BLD: 16.12 K/UL — HIGH (ref 3.8–10.5)
WBC # BLD: 16.27 K/UL — HIGH (ref 3.8–10.5)
WBC # FLD AUTO: 13.99 K/UL — HIGH (ref 3.8–10.5)
WBC # FLD AUTO: 14.58 K/UL — HIGH (ref 3.8–10.5)
WBC # FLD AUTO: 14.79 K/UL — HIGH (ref 3.8–10.5)
WBC # FLD AUTO: 16.12 K/UL — HIGH (ref 3.8–10.5)
WBC # FLD AUTO: 16.27 K/UL — HIGH (ref 3.8–10.5)

## 2020-02-11 PROCEDURE — 99291 CRITICAL CARE FIRST HOUR: CPT

## 2020-02-11 PROCEDURE — 88305 TISSUE EXAM BY PATHOLOGIST: CPT | Mod: 26

## 2020-02-11 PROCEDURE — 43235 EGD DIAGNOSTIC BRUSH WASH: CPT | Mod: GC

## 2020-02-11 PROCEDURE — 74177 CT ABD & PELVIS W/CONTRAST: CPT | Mod: 26

## 2020-02-11 PROCEDURE — 71045 X-RAY EXAM CHEST 1 VIEW: CPT | Mod: 26,77

## 2020-02-11 PROCEDURE — 99292 CRITICAL CARE ADDL 30 MIN: CPT

## 2020-02-11 PROCEDURE — 71045 X-RAY EXAM CHEST 1 VIEW: CPT | Mod: 26

## 2020-02-11 RX ORDER — DOBUTAMINE HCL 250MG/20ML
4 VIAL (ML) INTRAVENOUS
Qty: 500 | Refills: 0 | Status: DISCONTINUED | OUTPATIENT
Start: 2020-02-11 | End: 2020-02-12

## 2020-02-11 RX ORDER — PROPOFOL 10 MG/ML
5 INJECTION, EMULSION INTRAVENOUS
Qty: 500 | Refills: 0 | Status: DISCONTINUED | OUTPATIENT
Start: 2020-02-11 | End: 2020-02-11

## 2020-02-11 RX ORDER — HEPARIN SODIUM 5000 [USP'U]/ML
750 INJECTION INTRAVENOUS; SUBCUTANEOUS
Qty: 25000 | Refills: 0 | Status: DISCONTINUED | OUTPATIENT
Start: 2020-02-11 | End: 2020-02-11

## 2020-02-11 RX ORDER — CHLORHEXIDINE GLUCONATE 213 G/1000ML
15 SOLUTION TOPICAL EVERY 12 HOURS
Refills: 0 | Status: DISCONTINUED | OUTPATIENT
Start: 2020-02-11 | End: 2020-02-13

## 2020-02-11 RX ORDER — ACETAMINOPHEN 500 MG
1000 TABLET ORAL ONCE
Refills: 0 | Status: COMPLETED | OUTPATIENT
Start: 2020-02-11 | End: 2020-02-11

## 2020-02-11 RX ORDER — SOD SULF/SODIUM/NAHCO3/KCL/PEG
4000 SOLUTION, RECONSTITUTED, ORAL ORAL ONCE
Refills: 0 | Status: COMPLETED | OUTPATIENT
Start: 2020-02-11 | End: 2020-02-11

## 2020-02-11 RX ORDER — PROPOFOL 10 MG/ML
5 INJECTION, EMULSION INTRAVENOUS
Qty: 1000 | Refills: 0 | Status: DISCONTINUED | OUTPATIENT
Start: 2020-02-11 | End: 2020-02-13

## 2020-02-11 RX ORDER — MIDAZOLAM HYDROCHLORIDE 1 MG/ML
0.02 INJECTION, SOLUTION INTRAMUSCULAR; INTRAVENOUS
Qty: 100 | Refills: 0 | Status: DISCONTINUED | OUTPATIENT
Start: 2020-02-11 | End: 2020-02-11

## 2020-02-11 RX ORDER — IOHEXOL 300 MG/ML
30 INJECTION, SOLUTION INTRAVENOUS ONCE
Refills: 0 | Status: COMPLETED | OUTPATIENT
Start: 2020-02-11 | End: 2020-02-11

## 2020-02-11 RX ADMIN — Medication 3 MILLILITER(S): at 10:02

## 2020-02-11 RX ADMIN — Medication 8.18 MICROGRAM(S)/KG/MIN: at 14:01

## 2020-02-11 RX ADMIN — ATORVASTATIN CALCIUM 40 MILLIGRAM(S): 80 TABLET, FILM COATED ORAL at 22:38

## 2020-02-11 RX ADMIN — Medication 1000 MILLIGRAM(S): at 10:45

## 2020-02-11 RX ADMIN — MEMANTINE HYDROCHLORIDE 10 MILLIGRAM(S): 10 TABLET ORAL at 19:38

## 2020-02-11 RX ADMIN — LATANOPROST 1 DROP(S): 0.05 SOLUTION/ DROPS OPHTHALMIC; TOPICAL at 23:02

## 2020-02-11 RX ADMIN — Medication 3 MILLILITER(S): at 16:57

## 2020-02-11 RX ADMIN — DONEPEZIL HYDROCHLORIDE 5 MILLIGRAM(S): 10 TABLET, FILM COATED ORAL at 22:38

## 2020-02-11 RX ADMIN — Medication 4: at 06:32

## 2020-02-11 RX ADMIN — Medication 4: at 00:07

## 2020-02-11 RX ADMIN — IOHEXOL 30 MILLILITER(S): 300 INJECTION, SOLUTION INTRAVENOUS at 19:08

## 2020-02-11 RX ADMIN — Medication 3 MILLILITER(S): at 05:59

## 2020-02-11 RX ADMIN — PROPOFOL 2.05 MICROGRAM(S)/KG/MIN: 10 INJECTION, EMULSION INTRAVENOUS at 17:30

## 2020-02-11 RX ADMIN — Medication 100 GRAM(S): at 00:07

## 2020-02-11 RX ADMIN — Medication 400 MILLIGRAM(S): at 09:48

## 2020-02-11 RX ADMIN — PANTOPRAZOLE SODIUM 40 MILLIGRAM(S): 20 TABLET, DELAYED RELEASE ORAL at 19:37

## 2020-02-11 RX ADMIN — Medication 2: at 19:38

## 2020-02-11 RX ADMIN — CHLORHEXIDINE GLUCONATE 1 APPLICATION(S): 213 SOLUTION TOPICAL at 06:39

## 2020-02-11 RX ADMIN — Medication 4000 MILLILITER(S): at 22:39

## 2020-02-11 RX ADMIN — PANTOPRAZOLE SODIUM 40 MILLIGRAM(S): 20 TABLET, DELAYED RELEASE ORAL at 06:28

## 2020-02-11 RX ADMIN — Medication 2: at 12:20

## 2020-02-11 NOTE — PROGRESS NOTE ADULT - ASSESSMENT
87M w pmh CAD, hx CABG approx 10 years ago, DM2, hernia, dementia (AAOx2 at baseline) presents for NSTEMI, cardiogenic shock, GIB admitted to CCU for further management.     PLAN:     CARDIOVASCULAR  #Cardiogenic shock  With known CAD, Hx of CABG. On arrival with BP 79/61, elevated lactate and LFTs, cool lower extremities, cool lower ext, altered mental status compared to baseline. CXR with pulmonary congestion. Bedside echo performed by cards fellow showing reduced EF. Darleen cardiac calculation off dobutamine drip w CO: 3.6, CI 2.1, SV 33 however SVO2 from VBG. Lactate downtrending 2.8-->2.1  - C/w dobutamine drip 5mcg/kg  - F/U ECHO  - F/u 4pm lactate     #NSTEMI vs type II MI  EKG w anterolateral lead ST depressions w avR elevations with CAD and hx of cbg. Given avR elevation possible LM disease. With concern for GIB, however rectal exam negative for blood. Trop T at [facility] 11.2. At [facility] Trop T .94, CKMB 33.2. However possible that hypovolemia 2/2 from the GIB is causing a demand ischemia leading to elevated cardiac enzymes. Cardiac enzymes continue to uptrend from .94 to 1.46 and CKMB 33.2 to 60.6 despite stable Hgb, no BMs and adequate perfusion which may point more towards a NSTEMI.   - Heparin gtt restarting due to thought of more likely NSTEMI  - Consulting interventional for catheterization   - ASA 81, Plavix 75 daily  - Initiated lipitor 40mg, AST likely elevated from NSTEMI or Type II MI, ALT just slightly elevated  - Continue to trend cardiac enzymes q4h       PULMONARY  #Pulmonary Edema  With B/L crackles, tachypnea, belly breathing on arrival. CXR w B/L pulmonary congestion. Bedside Echo with reduced EF per cards fellow. Not on diuretic at home. S/p lasix 40mg  - No requirement of diuresis at this time   - currently on 2L NC   - lewis in place for strict I/Os  - daily weights      NEURO  W baseline mental status AAOx2. Currently AAOx1 likely in the setting of cardiogenic shock   - c/w home meds donepezil, memantine     RENAL  At [facility] Cr 1.12, with Cr .99 on arrival  - monitor BMP     ENDO  #DM2   A1c 6.7 this admission. On metformin 500 bid at home.  - c/w MISS    GI  #GIB  With witnessed episode of melena, stool guiac positive at [facility] and received 1 u prbc, IV protonix 80 x 1 there. Pre transfusion hgb 10.4. With no additional episodes of melena, negative rectal exam. However with repeat hgb (post transfusion) at [facility] 9.8, tachycardia, hypotension, altered mental status. S/p 2 u pRBC. Hgb staying stable at mid 10s on 2/10.   - GI consulted, per GI fellow, no plan for scope given poor hemodynamic status, plan for scope once patient is hemodynamically stable, of note, pt will need to be intubated for scope per GI  - Protonix 40mg IVP BID  - NPO except meds  - F/u 4pm H&H  - 3 PIVs, two 22g, one 18g    F - None  E - replete prn  N - NPO    DVT PPx - heparin ggt (given NSTEMI)    LINES - 3 PIVs, two 22s, one 18g. [facility] place 2/9      CODE - FULL    DISPO - CCU 87M w pmh CAD, hx CABG approx 10 years ago, DM2, hernia, dementia (AAOx2 at baseline) presents for NSTEMI, cardiogenic shock, GIB admitted to CCU for further management.     PLAN:     CARDIOVASCULAR  #Cardiogenic shock  With known CAD, Hx of CABG. On arrival with BP 79/61, elevated lactate and LFTs, cool lower extremities, cool lower ext, altered mental status compared to baseline with CXR showing pulmonary congestion. ECHO showing EF 25%. Patient has no signs of shock at this time (good urine output, warm extremities)  - C/w dobutamine drip      #NSTEMI vs type II MI  EKG w anterolateral lead ST depressions w avR elevations with CAD and hx of cbg. Given avR elevation possible LM disease. With concern for GIB, however rectal exam negative for blood. Trop T at [facility] 11.2. At [facility] Trop T .94, CKMB 33.2. However possible that hypovolemia 2/2 from the GIB is causing a demand ischemia leading to elevated cardiac enzymes. Trops continue to uptrend and CKMB plateaued. Pt had melanotic stool with stable Hgb once again making type II MI a possibility    - Heparin, ASa, and plavix held   - GI performing EGD   - c/w lipitor 40mg, AST likely elevated from NSTEMI or Type II MI, ALT just slightly elevated  - Continue to trend trops q4h       PULMONARY  #Pulmonary Edema  With B/L crackles, tachypnea, belly breathing on arrival. CXR w B/L pulmonary congestion. Bedside Echo with reduced EF per cards fellow. Not on diuretic at home.  - No requirement of diuresis at this time   - Now intubated s/p EGD       NEURO  W baseline mental status AAOx2. Currently AAOx1 likely in the setting of cardiogenic shock   - c/w home meds donepezil, memantine     RENAL  At [facility] Cr 1.12, with Cr .99 on arrival  - monitor BMP     ENDO  #DM2   A1c 6.7 this admission. On metformin 500 bid at home.  - c/w MISS    GI  #GIB  With witnessed episode of melena, stool guiac positive at [facility] and received 1 u prbc, IV protonix 80 x 1 there. Pre transfusion hgb 10.4. With no additional episodes of melena, negative rectal exam. However with repeat hgb (post transfusion) at [facility] 9.8, tachycardia, hypotension, altered mental status. S/p 2 u pRBC. Hgb staying stable at mid 10s on 2/10.   - EGD today finding a mass in the duodenum  - Plan for c-scope tomorrow  - CT A/P to better assess mass found on EGD   - Protonix 40mg IVP BID  - NPO except meds  - 3 PIVs, two 22g, one 18g    F - None  E - replete prn  N - NPO    DVT PPx - heparin ggt (given NSTEMI)    LINES - 3 PIVs, two 22s, one 18g. [facility] place 2/9      CODE - FULL    DISPO - CCU 87M w pmh CAD, hx CABG approx 10 years ago, DM2, hernia, dementia (AAOx2 at baseline) presents for NSTEMI, cardiogenic shock, GIB admitted to CCU for further management.     PLAN:     CARDIOVASCULAR  #Cardiogenic shock  With known CAD, Hx of CABG. On arrival with BP 79/61, elevated lactate and LFTs, cool lower extremities, cool lower ext, altered mental status compared to baseline with CXR showing pulmonary congestion. ECHO showing EF 25%. Patient has no signs of shock at this time (good urine output, warm extremities)  - C/w dobutamine drip      #NSTEMI vs type II MI  EKG w anterolateral lead ST depressions w avR elevations with CAD and hx of cbg. Given avR elevation possible LM disease. With concern for GIB, however rectal exam negative for blood. Trop T at [facility] 11.2. At [facility] Trop T .94, CKMB 33.2. However possible that hypovolemia 2/2 from the GIB is causing a demand ischemia leading to elevated cardiac enzymes. Trops continue to uptrend and CKMB plateaued. Pt had melanotic stool with stable Hgb once again making type II MI a possibility    - Heparin, ASa, and plavix held   - GI performing EGD   - c/w lipitor 40mg, AST likely elevated from NSTEMI or Type II MI, ALT just slightly elevated  - Continue to trend trops q4h       PULMONARY  #Pulmonary Edema  With B/L crackles, tachypnea, belly breathing on arrival. CXR w B/L pulmonary congestion. Bedside Echo with reduced EF per cards fellow. Not on diuretic at home.  - No requirement of diuresis at this time   - Now intubated s/p EGD     GI  #GIB  With witnessed episode of melena, stool guiac positive at [facility] and received 1 u prbc, IV protonix 80 x 1 there. Pre transfusion hgb 10.4. With no additional episodes of melena, negative rectal exam. However with repeat hgb (post transfusion) at [facility] 9.8, tachycardia, hypotension, altered mental status. S/p 2 u pRBC. Patient had melanotic stool on 2/11  - Additional 1u pRBC on 2/11 due to Hgb of about 9 with melanotic stool    - EGD today finding a mass in the duodenum  - Plan for c-scope tomorrow  - CT A/P to better assess mass found on EGD   - Protonix 40mg IVP BID  - NPO except meds  - 3 PIVs, two 22g, one 18g    ID  #Fever w/ leukocytosis  Pt experienced a 102 rectal temp on 2/11 along with the sustained leukocytosis. Leukocytosis may be in the setting of NSTEMI vs Type II MI and GIB.   - Bcx drawn on 2/11, f/u       NEURO  W baseline mental status AAOx2. Currently AAOx1 likely in the setting of cardiogenic shock   - c/w home meds donepezil, memantine     RENAL  At [facility] Cr 1.12, with Cr .99 on arrival  - monitor BMP     ENDO  #DM2   A1c 6.7 this admission. On metformin 500 bid at home.  - c/w MISS      F - None  E - replete prn  N - NPO    DVT PPx - heparin ggt (given NSTEMI)    LINES - 3 PIVs, two 22s, one 18g. [facility] place 2/9      CODE - FULL    DISPO - CCU

## 2020-02-11 NOTE — PROGRESS NOTE ADULT - SUBJECTIVE AND OBJECTIVE BOX
REDDY DANIELS  87y  Male    Patient is a 87y old  Male who presents with a chief complaint of Cardiogenic shock, GIB, NSTEMI (10 Feb 2020 12:01)      INTERVAL HPI/OVERNIGHT EVENTS:    REVIEW OF SYSTEMS:  CONSTITUTIONAL: No fever, weight loss, or fatigue  EYES: No eye pain, visual disturbances, or discharge  ENMT:  No difficulty hearing, tinnitus, vertigo; No sinus or throat pain  NECK: No pain or stiffness  BREASTS: No pain, masses, or nipple discharge  RESPIRATORY: No cough, wheezing, chills or hemoptysis; No shortness of breath  CARDIOVASCULAR: No chest pain, palpitations, dizziness, or leg swelling  GASTROINTESTINAL: No abdominal or epigastric pain. No nausea, vomiting, or hematemesis; No diarrhea or constipation. No melena or hematochezia.  GENITOURINARY: No dysuria, frequency, hematuria, or incontinence  NEUROLOGICAL: No headaches, memory loss, loss of strength, numbness, or tremors  SKIN: No itching, burning, rashes, or lesions   LYMPH NODES: No enlarged glands  ENDOCRINE: No heat or cold intolerance; No hair loss  MUSCULOSKELETAL: No joint pain or swelling; No muscle, back, or extremity pain  PSYCHIATRIC: No depression, anxiety, mood swings, or difficulty sleeping  HEME/LYMPH: No easy bruising, or bleeding gums  ALLERY AND IMMUNOLOGIC: No hives or eczema    T(C): 38.7 (20 @ 08:45), Max: 38.7 (20 @ 08:45)  HR: 103 (20 @ 10:00) (100 - 117)  BP: 96/59 (20 @ 10:00) (90/57 - 126/79)  RR: 29 (20 @ 10:00) (22 - 45)  SpO2: 98% (20 @ 10:00) (96% - 100%)  Wt(kg): --Vital Signs Last 24 Hrs  T(C): 38.7 (2020 08:45), Max: 38.7 (2020 08:45)  T(F): 101.6 (2020 08:45), Max: 101.6 (2020 08:45)  HR: 103 (2020 10:00) (100 - 117)  BP: 96/59 (2020 10:00) (90/57 - 126/79)  BP(mean): 73 (2020 10:00) (69 - 91)  RR: 29 (2020 10:00) (22 - 45)  SpO2: 98% (2020 10:00) (96% - 100%)    PHYSICAL EXAM:  GENERAL: NAD, well-groomed, well-developed  HEAD:  Atraumatic, Normocephalic  EYES: EOMI, PERRLA, conjunctiva and sclera clear  ENMT: No tonsillar erythema, exudates, or enlargement; Moist mucous membranes, Good dentition, No lesions  NECK: Supple, No JVD, Normal thyroid  NERVOUS SYSTEM:  Alert & Oriented X3, Good concentration; Motor Strength 5/5 B/L upper and lower extremities; DTRs 2+ intact and symmetric  CHEST/LUNG: Clear to auscultation bilaterally; No rales, rhonchi, wheezing, or rubs  HEART: Regular rate and rhythm; No murmurs, rubs, or gallops  ABDOMEN: Soft, Nontender, Nondistended; Bowel sounds present  EXTREMITIES:  WWP, No clubbing, cyanosis, or edema  Vascular: 2+ peripheral pulses x4  LYMPH: No lymphadenopathy noted  SKIN: No rashes or lesions    Consultant(s) Notes Reviewed:  [x ] YES  [ ] NO  Care Discussed with Consultants/Other Providers [ x] YES  [ ] NO    LABS:                        9.1    16.27 )-----------( 158      ( 2020 10:21 )             27.3     02-11    142  |  107  |  31<H>  ----------------------------<  214<H>  3.8   |  21<L>  |  1.18    Ca    8.2<L>      2020 06:18  Phos  1.6     02-11  Mg     2.2     02-11    TPro  5.4<L>  /  Alb  2.8<L>  /  TBili  0.5  /  DBili  x   /  AST  92<H>  /  ALT  42  /  AlkPhos  46  02-11      PT/INR - ( 2020 02:24 )   PT: 17.3 sec;   INR: 1.50          PTT - ( 2020 06:18 )  PTT:80.8 sec  Urinalysis Basic - ( 10 Feb 2020 13:43 )    Color: Yellow / Appearance: Clear / S.020 / pH: x  Gluc: x / Ketone: Trace mg/dL  / Bili: Negative / Urobili: 0.2 E.U./dL   Blood: x / Protein: Trace mg/dL / Nitrite: NEGATIVE   Leuk Esterase: Trace / RBC: Many /HPF / WBC < 5 /HPF   Sq Epi: x / Non Sq Epi: 0-5 /HPF / Bacteria: Present /HPF      CAPILLARY BLOOD GLUCOSE      POCT Blood Glucose.: 213 mg/dL (2020 06:21)  POCT Blood Glucose.: 237 mg/dL (10 Feb 2020 23:46)  POCT Blood Glucose.: 168 mg/dL (10 Feb 2020 17:21)  POCT Blood Glucose.: 141 mg/dL (10 Feb 2020 11:37)      ABG - ( 10 Feb 2020 23:39 )  pH, Arterial: 7.55  pH, Blood: x     /  pCO2: 25    /  pO2: 136   / HCO3: 22    / Base Excess: 0.2   /  SaO2: 99                Urinalysis Basic - ( 10 Feb 2020 13:43 )    Color: Yellow / Appearance: Clear / S.020 / pH: x  Gluc: x / Ketone: Trace mg/dL  / Bili: Negative / Urobili: 0.2 E.U./dL   Blood: x / Protein: Trace mg/dL / Nitrite: NEGATIVE   Leuk Esterase: Trace / RBC: Many /HPF / WBC < 5 /HPF   Sq Epi: x / Non Sq Epi: 0-5 /HPF / Bacteria: Present /HPF        RADIOLOGY & ADDITIONAL TESTS:    Imaging Personally Reviewed:  [ ] YES  [ ] NO    HEALTH ISSUES - PROBLEM Dx: REDDY DANIELS  87y  Male    Patient is a 87y old  Male who presents with a chief complaint of Cardiogenic shock, GIB, NSTEMI (10 Feb 2020 12:01)      INTERVAL HPI/OVERNIGHT EVENTS:  Dobutamine drip increased to 5. Patient placed on BiPAP for tachypnea and B/L rales. Lasix given with good urine output. Patient was taken off BiPAP in the AM with good response to breathing on RA. CKMB plateaued and Trop continued to uptrend with a down-trending lactate. Patient had a melanotic stool, Hgb stayed stable at 9.1. Plavix, asa and heparin d/c'd.  Patient denies any complaints this am.         T(C): 38.7 (20 @ 08:45), Max: 38.7 (20 @ 08:45)  HR: 103 (20 @ 10:00) (100 - 117)  BP: 96/59 (20 @ 10:00) (90/57 - 126/79)  RR: 29 (20 @ 10:00) (22 - 45)  SpO2: 98% (20 @ 10:00) (96% - 100%)  Wt(kg): --Vital Signs Last 24 Hrs  T(C): 38.7 (2020 08:45), Max: 38.7 (2020 08:45)  T(F): 101.6 (2020 08:45), Max: 101.6 (2020 08:45)  HR: 103 (2020 10:00) (100 - 117)  BP: 96/59 (2020 10:00) (90/57 - 126/79)  BP(mean): 73 (2020 10:00) (69 - 91)  RR: 29 (2020 10:00) (22 - 45)  SpO2: 98% (2020 10:00) (96% - 100%)    PHYSICAL EXAM:  GENERAL: NAD  ENMT: Moist mucous membranes  NECK: Supple, No JVD  NERVOUS SYSTEM:  Alert & Oriented X3, Good concentration; Motor Strength 5/5 B/L upper and lower extremities; DTRs 2+ intact and symmetric  CHEST/LUNG: Bibasilar crackles  HEART: Regular rate and rhythm; No murmurs, rubs, or gallops  ABDOMEN: Soft, Nontender, Nondistended; Bowel sounds present  EXTREMITIES:  WWP, No clubbing, cyanosis, or edema  Vascular: 2+ UE pulses and 1+ LE pulses       Consultant(s) Notes Reviewed:  [x ] YES  [ ] NO  Care Discussed with Consultants/Other Providers [ x] YES  [ ] NO    LABS:                        9.1    16.27 )-----------( 158      ( 2020 10:21 )             27.3     02-11    142  |  107  |  31<H>  ----------------------------<  214<H>  3.8   |  21<L>  |  1.18    Ca    8.2<L>      2020 06:18  Phos  1.6     02-11  Mg     2.2     02-11    TPro  5.4<L>  /  Alb  2.8<L>  /  TBili  0.5  /  DBili  x   /  AST  92<H>  /  ALT  42  /  AlkPhos  46  02-11      PT/INR - ( 2020 02:24 )   PT: 17.3 sec;   INR: 1.50          PTT - ( 2020 06:18 )  PTT:80.8 sec  Urinalysis Basic - ( 10 Feb 2020 13:43 )    Color: Yellow / Appearance: Clear / S.020 / pH: x  Gluc: x / Ketone: Trace mg/dL  / Bili: Negative / Urobili: 0.2 E.U./dL   Blood: x / Protein: Trace mg/dL / Nitrite: NEGATIVE   Leuk Esterase: Trace / RBC: Many /HPF / WBC < 5 /HPF   Sq Epi: x / Non Sq Epi: 0-5 /HPF / Bacteria: Present /HPF      CAPILLARY BLOOD GLUCOSE      POCT Blood Glucose.: 213 mg/dL (2020 06:21)  POCT Blood Glucose.: 237 mg/dL (10 Feb 2020 23:46)  POCT Blood Glucose.: 168 mg/dL (10 Feb 2020 17:21)  POCT Blood Glucose.: 141 mg/dL (10 Feb 2020 11:37)      ABG - ( 10 Feb 2020 23:39 )  pH, Arterial: 7.55  pH, Blood: x     /  pCO2: 25    /  pO2: 136   / HCO3: 22    / Base Excess: 0.2   /  SaO2: 99                Urinalysis Basic - ( 10 Feb 2020 13:43 )    Color: Yellow / Appearance: Clear / S.020 / pH: x  Gluc: x / Ketone: Trace mg/dL  / Bili: Negative / Urobili: 0.2 E.U./dL   Blood: x / Protein: Trace mg/dL / Nitrite: NEGATIVE   Leuk Esterase: Trace / RBC: Many /HPF / WBC < 5 /HPF   Sq Epi: x / Non Sq Epi: 0-5 /HPF / Bacteria: Present /HPF        RADIOLOGY & ADDITIONAL TESTS:    Imaging Personally Reviewed:  [ ] YES  [ ] NO    HEALTH ISSUES - PROBLEM Dx:

## 2020-02-12 LAB
ALBUMIN SERPL ELPH-MCNC: 2.6 G/DL — LOW (ref 3.3–5)
ALBUMIN SERPL ELPH-MCNC: 2.7 G/DL — LOW (ref 3.3–5)
ALP SERPL-CCNC: 40 U/L — SIGNIFICANT CHANGE UP (ref 40–120)
ALP SERPL-CCNC: 45 U/L — SIGNIFICANT CHANGE UP (ref 40–120)
ALT FLD-CCNC: 31 U/L — SIGNIFICANT CHANGE UP (ref 10–45)
ALT FLD-CCNC: 31 U/L — SIGNIFICANT CHANGE UP (ref 10–45)
ANION GAP SERPL CALC-SCNC: 11 MMOL/L — SIGNIFICANT CHANGE UP (ref 5–17)
ANION GAP SERPL CALC-SCNC: 17 MMOL/L — SIGNIFICANT CHANGE UP (ref 5–17)
AST SERPL-CCNC: 47 U/L — HIGH (ref 10–40)
AST SERPL-CCNC: 50 U/L — HIGH (ref 10–40)
BASOPHILS # BLD AUTO: 0.06 K/UL — SIGNIFICANT CHANGE UP (ref 0–0.2)
BASOPHILS NFR BLD AUTO: 0.4 % — SIGNIFICANT CHANGE UP (ref 0–2)
BILIRUB SERPL-MCNC: 0.5 MG/DL — SIGNIFICANT CHANGE UP (ref 0.2–1.2)
BILIRUB SERPL-MCNC: 0.6 MG/DL — SIGNIFICANT CHANGE UP (ref 0.2–1.2)
BLD GP AB SCN SERPL QL: NEGATIVE — SIGNIFICANT CHANGE UP
BUN SERPL-MCNC: 23 MG/DL — SIGNIFICANT CHANGE UP (ref 7–23)
BUN SERPL-MCNC: 27 MG/DL — HIGH (ref 7–23)
CALCIUM SERPL-MCNC: 7.5 MG/DL — LOW (ref 8.4–10.5)
CALCIUM SERPL-MCNC: 7.5 MG/DL — LOW (ref 8.4–10.5)
CHLORIDE SERPL-SCNC: 104 MMOL/L — SIGNIFICANT CHANGE UP (ref 96–108)
CHLORIDE SERPL-SCNC: 106 MMOL/L — SIGNIFICANT CHANGE UP (ref 96–108)
CO2 SERPL-SCNC: 18 MMOL/L — LOW (ref 22–31)
CO2 SERPL-SCNC: 20 MMOL/L — LOW (ref 22–31)
CREAT SERPL-MCNC: 1.01 MG/DL — SIGNIFICANT CHANGE UP (ref 0.5–1.3)
CREAT SERPL-MCNC: 1.04 MG/DL — SIGNIFICANT CHANGE UP (ref 0.5–1.3)
EOSINOPHIL # BLD AUTO: 0.13 K/UL — SIGNIFICANT CHANGE UP (ref 0–0.5)
EOSINOPHIL NFR BLD AUTO: 0.8 % — SIGNIFICANT CHANGE UP (ref 0–6)
GLUCOSE SERPL-MCNC: 144 MG/DL — HIGH (ref 70–99)
GLUCOSE SERPL-MCNC: 191 MG/DL — HIGH (ref 70–99)
HCT VFR BLD CALC: 27.1 % — LOW (ref 39–50)
HCT VFR BLD CALC: 27.7 % — LOW (ref 39–50)
HCT VFR BLD CALC: 27.9 % — LOW (ref 39–50)
HCT VFR BLD CALC: 28 % — LOW (ref 39–50)
HCT VFR BLD CALC: 29.6 % — LOW (ref 39–50)
HCT VFR BLD CALC: 32.1 % — LOW (ref 39–50)
HGB BLD-MCNC: 10.6 G/DL — LOW (ref 13–17)
HGB BLD-MCNC: 8.9 G/DL — LOW (ref 13–17)
HGB BLD-MCNC: 9.1 G/DL — LOW (ref 13–17)
HGB BLD-MCNC: 9.2 G/DL — LOW (ref 13–17)
HGB BLD-MCNC: 9.4 G/DL — LOW (ref 13–17)
HGB BLD-MCNC: 9.7 G/DL — LOW (ref 13–17)
IMM GRANULOCYTES NFR BLD AUTO: 0.5 % — SIGNIFICANT CHANGE UP (ref 0–1.5)
LACTATE SERPL-SCNC: 1.2 MMOL/L — SIGNIFICANT CHANGE UP (ref 0.5–2)
LACTATE SERPL-SCNC: 1.6 MMOL/L — SIGNIFICANT CHANGE UP (ref 0.5–2)
LACTATE SERPL-SCNC: 1.9 MMOL/L — SIGNIFICANT CHANGE UP (ref 0.5–2)
LYMPHOCYTES # BLD AUTO: 14.4 % — SIGNIFICANT CHANGE UP (ref 13–44)
LYMPHOCYTES # BLD AUTO: 2.46 K/UL — SIGNIFICANT CHANGE UP (ref 1–3.3)
MAGNESIUM SERPL-MCNC: 1.8 MG/DL — SIGNIFICANT CHANGE UP (ref 1.6–2.6)
MCHC RBC-ENTMCNC: 29.9 PG — SIGNIFICANT CHANGE UP (ref 27–34)
MCHC RBC-ENTMCNC: 30 PG — SIGNIFICANT CHANGE UP (ref 27–34)
MCHC RBC-ENTMCNC: 30.2 PG — SIGNIFICANT CHANGE UP (ref 27–34)
MCHC RBC-ENTMCNC: 30.2 PG — SIGNIFICANT CHANGE UP (ref 27–34)
MCHC RBC-ENTMCNC: 30.3 PG — SIGNIFICANT CHANGE UP (ref 27–34)
MCHC RBC-ENTMCNC: 32.1 GM/DL — SIGNIFICANT CHANGE UP (ref 32–36)
MCHC RBC-ENTMCNC: 32.8 GM/DL — SIGNIFICANT CHANGE UP (ref 32–36)
MCHC RBC-ENTMCNC: 32.9 GM/DL — SIGNIFICANT CHANGE UP (ref 32–36)
MCHC RBC-ENTMCNC: 33 GM/DL — SIGNIFICANT CHANGE UP (ref 32–36)
MCHC RBC-ENTMCNC: 33.7 GM/DL — SIGNIFICANT CHANGE UP (ref 32–36)
MCV RBC AUTO: 90 FL — SIGNIFICANT CHANGE UP (ref 80–100)
MCV RBC AUTO: 91.4 FL — SIGNIFICANT CHANGE UP (ref 80–100)
MCV RBC AUTO: 91.5 FL — SIGNIFICANT CHANGE UP (ref 80–100)
MCV RBC AUTO: 91.8 FL — SIGNIFICANT CHANGE UP (ref 80–100)
MCV RBC AUTO: 93.3 FL — SIGNIFICANT CHANGE UP (ref 80–100)
MONOCYTES # BLD AUTO: 1.76 K/UL — HIGH (ref 0–0.9)
MONOCYTES NFR BLD AUTO: 10.3 % — SIGNIFICANT CHANGE UP (ref 2–14)
NEUTROPHILS # BLD AUTO: 12.62 K/UL — HIGH (ref 1.8–7.4)
NEUTROPHILS NFR BLD AUTO: 73.6 % — SIGNIFICANT CHANGE UP (ref 43–77)
NRBC # BLD: 0 /100 WBCS — SIGNIFICANT CHANGE UP (ref 0–0)
PHOSPHATE SERPL-MCNC: 2.2 MG/DL — LOW (ref 2.5–4.5)
PLATELET # BLD AUTO: 122 K/UL — LOW (ref 150–400)
PLATELET # BLD AUTO: 123 K/UL — LOW (ref 150–400)
PLATELET # BLD AUTO: 127 K/UL — LOW (ref 150–400)
PLATELET # BLD AUTO: 139 K/UL — LOW (ref 150–400)
PLATELET # BLD AUTO: 140 K/UL — LOW (ref 150–400)
POTASSIUM SERPL-MCNC: 3.7 MMOL/L — SIGNIFICANT CHANGE UP (ref 3.5–5.3)
POTASSIUM SERPL-MCNC: 4 MMOL/L — SIGNIFICANT CHANGE UP (ref 3.5–5.3)
POTASSIUM SERPL-SCNC: 3.7 MMOL/L — SIGNIFICANT CHANGE UP (ref 3.5–5.3)
POTASSIUM SERPL-SCNC: 4 MMOL/L — SIGNIFICANT CHANGE UP (ref 3.5–5.3)
PROT SERPL-MCNC: 4.8 G/DL — LOW (ref 6–8.3)
PROT SERPL-MCNC: 5.1 G/DL — LOW (ref 6–8.3)
RBC # BLD: 2.97 M/UL — LOW (ref 4.2–5.8)
RBC # BLD: 3.05 M/UL — LOW (ref 4.2–5.8)
RBC # BLD: 3.1 M/UL — LOW (ref 4.2–5.8)
RBC # BLD: 3.24 M/UL — LOW (ref 4.2–5.8)
RBC # BLD: 3.51 M/UL — LOW (ref 4.2–5.8)
RBC # FLD: 16 % — HIGH (ref 10.3–14.5)
RBC # FLD: 16.1 % — HIGH (ref 10.3–14.5)
RBC # FLD: 16.1 % — HIGH (ref 10.3–14.5)
RBC # FLD: 16.4 % — HIGH (ref 10.3–14.5)
RBC # FLD: 16.7 % — HIGH (ref 10.3–14.5)
RH IG SCN BLD-IMP: POSITIVE — SIGNIFICANT CHANGE UP
SODIUM SERPL-SCNC: 137 MMOL/L — SIGNIFICANT CHANGE UP (ref 135–145)
SODIUM SERPL-SCNC: 139 MMOL/L — SIGNIFICANT CHANGE UP (ref 135–145)
WBC # BLD: 14.13 K/UL — HIGH (ref 3.8–10.5)
WBC # BLD: 14.33 K/UL — HIGH (ref 3.8–10.5)
WBC # BLD: 14.36 K/UL — HIGH (ref 3.8–10.5)
WBC # BLD: 14.9 K/UL — HIGH (ref 3.8–10.5)
WBC # BLD: 17.22 K/UL — HIGH (ref 3.8–10.5)
WBC # FLD AUTO: 14.13 K/UL — HIGH (ref 3.8–10.5)
WBC # FLD AUTO: 14.33 K/UL — HIGH (ref 3.8–10.5)
WBC # FLD AUTO: 14.36 K/UL — HIGH (ref 3.8–10.5)
WBC # FLD AUTO: 14.9 K/UL — HIGH (ref 3.8–10.5)
WBC # FLD AUTO: 17.22 K/UL — HIGH (ref 3.8–10.5)

## 2020-02-12 PROCEDURE — 99291 CRITICAL CARE FIRST HOUR: CPT

## 2020-02-12 PROCEDURE — 99292 CRITICAL CARE ADDL 30 MIN: CPT

## 2020-02-12 PROCEDURE — 45378 DIAGNOSTIC COLONOSCOPY: CPT | Mod: GC

## 2020-02-12 PROCEDURE — 71045 X-RAY EXAM CHEST 1 VIEW: CPT | Mod: 26

## 2020-02-12 RX ORDER — DOBUTAMINE HCL 250MG/20ML
4 VIAL (ML) INTRAVENOUS
Qty: 500 | Refills: 0 | Status: DISCONTINUED | OUTPATIENT
Start: 2020-02-12 | End: 2020-02-12

## 2020-02-12 RX ORDER — DOBUTAMINE HCL 250MG/20ML
4 VIAL (ML) INTRAVENOUS
Qty: 500 | Refills: 0 | Status: DISCONTINUED | OUTPATIENT
Start: 2020-02-12 | End: 2020-02-13

## 2020-02-12 RX ORDER — DOBUTAMINE HCL 250MG/20ML
3.5 VIAL (ML) INTRAVENOUS
Qty: 1000 | Refills: 0 | Status: DISCONTINUED | OUTPATIENT
Start: 2020-02-12 | End: 2020-02-12

## 2020-02-12 RX ORDER — ACETAMINOPHEN 500 MG
650 TABLET ORAL ONCE
Refills: 0 | Status: COMPLETED | OUTPATIENT
Start: 2020-02-12 | End: 2020-02-12

## 2020-02-12 RX ORDER — POTASSIUM CHLORIDE 20 MEQ
40 PACKET (EA) ORAL ONCE
Refills: 0 | Status: COMPLETED | OUTPATIENT
Start: 2020-02-12 | End: 2020-02-12

## 2020-02-12 RX ORDER — MAGNESIUM SULFATE 500 MG/ML
2 VIAL (ML) INJECTION ONCE
Refills: 0 | Status: COMPLETED | OUTPATIENT
Start: 2020-02-12 | End: 2020-02-12

## 2020-02-12 RX ADMIN — Medication 3 MILLILITER(S): at 05:54

## 2020-02-12 RX ADMIN — PANTOPRAZOLE SODIUM 40 MILLIGRAM(S): 20 TABLET, DELAYED RELEASE ORAL at 05:54

## 2020-02-12 RX ADMIN — MEMANTINE HYDROCHLORIDE 10 MILLIGRAM(S): 10 TABLET ORAL at 05:54

## 2020-02-12 RX ADMIN — Medication 2: at 18:10

## 2020-02-12 RX ADMIN — Medication 3.58 MICROGRAM(S)/KG/MIN: at 10:19

## 2020-02-12 RX ADMIN — MEMANTINE HYDROCHLORIDE 10 MILLIGRAM(S): 10 TABLET ORAL at 18:10

## 2020-02-12 RX ADMIN — CHLORHEXIDINE GLUCONATE 15 MILLILITER(S): 213 SOLUTION TOPICAL at 05:54

## 2020-02-12 RX ADMIN — FINASTERIDE 5 MILLIGRAM(S): 5 TABLET, FILM COATED ORAL at 12:55

## 2020-02-12 RX ADMIN — Medication 3 MILLILITER(S): at 10:51

## 2020-02-12 RX ADMIN — DONEPEZIL HYDROCHLORIDE 5 MILLIGRAM(S): 10 TABLET, FILM COATED ORAL at 22:41

## 2020-02-12 RX ADMIN — Medication 2: at 06:10

## 2020-02-12 RX ADMIN — Medication 3 MILLILITER(S): at 01:22

## 2020-02-12 RX ADMIN — Medication 50 GRAM(S): at 05:54

## 2020-02-12 RX ADMIN — Medication 650 MILLIGRAM(S): at 21:00

## 2020-02-12 RX ADMIN — PROPOFOL 2.05 MICROGRAM(S)/KG/MIN: 10 INJECTION, EMULSION INTRAVENOUS at 18:31

## 2020-02-12 RX ADMIN — Medication 650 MILLIGRAM(S): at 19:57

## 2020-02-12 RX ADMIN — CHLORHEXIDINE GLUCONATE 15 MILLILITER(S): 213 SOLUTION TOPICAL at 18:12

## 2020-02-12 RX ADMIN — Medication 3 MILLILITER(S): at 22:53

## 2020-02-12 RX ADMIN — Medication 3 MILLILITER(S): at 16:56

## 2020-02-12 RX ADMIN — Medication 40 MILLIEQUIVALENT(S): at 05:43

## 2020-02-12 RX ADMIN — PANTOPRAZOLE SODIUM 40 MILLIGRAM(S): 20 TABLET, DELAYED RELEASE ORAL at 18:10

## 2020-02-12 RX ADMIN — ATORVASTATIN CALCIUM 40 MILLIGRAM(S): 80 TABLET, FILM COATED ORAL at 22:41

## 2020-02-12 RX ADMIN — Medication 62.5 MILLIMOLE(S): at 11:26

## 2020-02-12 RX ADMIN — CHLORHEXIDINE GLUCONATE 1 APPLICATION(S): 213 SOLUTION TOPICAL at 05:54

## 2020-02-12 RX ADMIN — Medication 2: at 12:54

## 2020-02-12 NOTE — PROGRESS NOTE ADULT - SUBJECTIVE AND OBJECTIVE BOX
REDDY DANIELS  87y  Male    Patient is a 87y old  Male who presents with a chief complaint of Cardiogenic shock, GIB, NSTEMI (2020 11:04)      INTERVAL HPI/OVERNIGHT EVENTS: O/n the patient had a Hgb of 8.9 right before a melanotic BM, the patient then received 1u pRBC. He then had another large melanotic BM. repeat Hgb was 10.6. Lactate uptrended from 1.6 ot 1.9. EGD and CT A/P both found a 9mm polypoid mass in the 2nd part of the duodenum. EGD found no source of bleeding. This am the patient is still intubate and tolerated a CPAP trial well.     T(C): 37.6 (20 @ 09:00), Max: 37.6 (20 @ 17:20)  HR: 96 (20 @ 11:00) (93 - 120)  BP: 91/64 (20 @ 21:00) (85/53 - 128/71)  RR: 18 (20 @ 11:00) (12 - 34)  SpO2: 98% (20 @ 11:00) (95% - 100%)  Wt(kg): --Vital Signs Last 24 Hrs  T(C): 37.6 (2020 09:00), Max: 37.6 (2020 17:20)  T(F): 99.7 (2020 09:00), Max: 99.7 (2020 17:20)  HR: 96 (:00) (93 - 120)  BP: 91/64 (2020 21:00) (85/53 - 128/71)  BP(mean): 74 (:00) (65 - 95)  RR: 18 (:00) (12 - 34)  SpO2: 98% (2020 11:00) (95% - 100%)    PHYSICAL EXAM:  GENERAL: intubated and sedated on propofol   HEAD:  Atraumatic, Normocephalic  NECK: Supple, No JVD  NERVOUS SYSTEM: Intubated and sedated, breathing above the vent   CHEST/LUNG: B/L rales in the lower lung fields B/L   HEART: Regular rate and rhythm; No murmurs, rubs, or gallops. CVP of 8  ABDOMEN: Soft, Nontender, Nondistended; Bowel sounds present  EXTREMITIES: No edema, cool feet B/L   Vascular: 2+ UE pulses B/L and unable to palpate LE pulses B/L       Consultant(s) Notes Reviewed:  [x ] YES  [ ] NO  Care Discussed with Consultants/Other Providers [ x] YES  [ ] NO    LABS:                        9.7    14.33 )-----------( 123      ( 2020 09:17 )             29.6     02-12    139  |  104  |  23  ----------------------------<  191<H>  4.0   |  18<L>  |  1.01    Ca    7.5<L>      2020 04:49  Phos  2.2     02-12  Mg     1.8     02-12    TPro  5.1<L>  /  Alb  2.7<L>  /  TBili  0.6  /  DBili  x   /  AST  50<H>  /  ALT  31  /  AlkPhos  45  02-12      PT/INR - ( 2020 02:24 )   PT: 17.3 sec;   INR: 1.50          PTT - ( 2020 06:18 )  PTT:80.8 sec  Urinalysis Basic - ( 10 Feb 2020 13:43 )    Color: Yellow / Appearance: Clear / S.020 / pH: x  Gluc: x / Ketone: Trace mg/dL  / Bili: Negative / Urobili: 0.2 E.U./dL   Blood: x / Protein: Trace mg/dL / Nitrite: NEGATIVE   Leuk Esterase: Trace / RBC: Many /HPF / WBC < 5 /HPF   Sq Epi: x / Non Sq Epi: 0-5 /HPF / Bacteria: Present /HPF      CAPILLARY BLOOD GLUCOSE      POCT Blood Glucose.: 168 mg/dL (2020 06:06)  POCT Blood Glucose.: 135 mg/dL (2020 23:50)  POCT Blood Glucose.: 173 mg/dL (2020 19:34)  POCT Blood Glucose.: 184 mg/dL (2020 16:34)  POCT Blood Glucose.: 170 mg/dL (2020 12:06)      ABG - ( 2020 22:06 )  pH, Arterial: 7.52  pH, Blood: x     /  pCO2: 29    /  pO2: 147   / HCO3: 23    / Base Excess: 0.5   /  SaO2: 99                Urinalysis Basic - ( 10 Feb 2020 13:43 )    Color: Yellow / Appearance: Clear / S.020 / pH: x  Gluc: x / Ketone: Trace mg/dL  / Bili: Negative / Urobili: 0.2 E.U./dL   Blood: x / Protein: Trace mg/dL / Nitrite: NEGATIVE   Leuk Esterase: Trace / RBC: Many /HPF / WBC < 5 /HPF   Sq Epi: x / Non Sq Epi: 0-5 /HPF / Bacteria: Present /HPF        RADIOLOGY & ADDITIONAL TESTS:    Imaging Personally Reviewed:  [ ] YES  [ ] NO    HEALTH ISSUES - PROBLEM Dx:

## 2020-02-12 NOTE — PROGRESS NOTE ADULT - ASSESSMENT
87M w pmh CAD, hx CABG approx 10 years ago, DM2, hernia, dementia (AAOx2 at baseline) presents for NSTEMI, cardiogenic shock, GIB admitted to CCU for further management.     PLAN:     CARDIOVASCULAR  #Cardiogenic shock vs hypovolemic shock from GIB  With known CAD, Hx of CABG. On arrival with BP 79/61, elevated lactate and LFTs, cool lower extremities, cool lower ext, altered mental status compared to baseline with CXR showing pulmonary congestion. ECHO showing EF 25%. CVP of 8. B/L feet cool this am.   - Administering 1 uPRBC on 2/12  - UOP improving over this morning with UOP of 70, 75, 125 this am   - C/w dobutamine drip      #Type II MI in the setting of GIB  EKG w anterolateral lead ST depressions w avR elevations with CAD and hx of cbg. Given avR elevation possible LM disease. With concern for GIB, however rectal exam negative for blood. Trop T at [facility] 11.2. At [facility] Trop T .94, CKMB 33.2. Hypovolemia 2/2 from the GIB is causing a demand ischemia leading to elevated cardiac enzymes. Two melanotic stool o/n (2/11-2/12). Trops and CKM have been trended to peak  - C/w holding Heparin, ASa, and plavix in the setting of active GIB  - GI performing C-scope today to ascertain location of bleed  - c/w lipitor 40mg        PULMONARY  #Pulmonary Edema  With B/L crackles, tachypnea, belly breathing on arrival. CXR w B/L pulmonary congestion. Bedside Echo with reduced EF per cards fellow. Not on diuretic at home. CXR showing worsening congestion on 2/12  - No diuresis at this time due to shock picture    - Now intubated     GI  #GIB  With witnessed episode of melena, stool guiac positive at [facility] and received 1 u prbc, IV protonix 80 x 1 there. Pre transfusion hgb 10.4. With no additional episodes of melena, negative rectal exam. However with repeat hgb (post transfusion) at [facility] 9.8, tachycardia, hypotension, altered mental status. S/p 2 u pRBC. Patient had melanotic stool on 2/11 and 2 o/n into the 12th. EGD and CT A/P both found mass in the 2nd duodenum. EGD did not find source of bleeding   - Additional 1u pRBC on 2/12 due to Hgb of 9.7 this am   - C-scope today to find source of bleeding   - Protonix 40mg IVP BID  - NPO except meds  - 3 PIVs, two 22g, one 18g    ID  #Fever w/ leukocytosis  Pt experienced a 102 rectal temp on 2/11 along with the sustained leukocytosis. Leukocytosis may be in the setting of Type II MI and GIB.   - Bcx drawn on 2/11, NGTD       NEURO  W baseline mental status AAOx2. Currently AAOx1 likely in the setting of cardiogenic shock   - c/w home meds donepezil, memantine     RENAL  At [facility] Cr 1.12, with Cr .99 on arrival  - monitor BMP     ENDO  #DM2   A1c 6.7 this admission. On metformin 500 bid at home.  - c/w MISS      F - None  E - replete prn  N - NPO    DVT PPx - heparin ggt (given NSTEMI)    LINES - 3 PIVs, two 22s, one 18g. [facility] place 2/9      CODE - FULL    DISPO - CCU 87M w pmh CAD, hx CABG approx 10 years ago, DM2, hernia, dementia (AAOx2 at baseline) presents for NSTEMI, cardiogenic shock, GIB admitted to CCU for further management.     PLAN:     CARDIOVASCULAR  #Cardiogenic shock vs hypovolemic shock from GIB  With known CAD, Hx of CABG. On arrival with BP 79/61, elevated lactate and LFTs, cool lower extremities, cool lower ext, altered mental status compared to baseline with CXR showing pulmonary congestion. ECHO showing EF 25%. CVP of 8 indicating less likely hypovolemia. B/L feet cool this am.   - Administering 1 uPRBC on 2/12  - UOP improving over this morning with UOP of 70, 75, 125 this am   - C/w dobutamine drip      #Type II MI in the setting of GIB  EKG w anterolateral lead ST depressions w avR elevations with CAD and hx of cbg. Given avR elevation possible LM disease. With concern for GIB, however rectal exam negative for blood. Trop T at [facility] 11.2. At [facility] Trop T .94, CKMB 33.2. Hypovolemia 2/2 from the GIB is causing a demand ischemia leading to elevated cardiac enzymes. Two melanotic stool o/n (2/11-2/12). Trops and CKM have been trended to peak  - C/w holding Heparin, ASa, and plavix in the setting of active GIB  - GI performing C-scope today to ascertain location of bleed  - c/w lipitor 40mg        PULMONARY  #Pulmonary Edema  With B/L crackles, tachypnea, belly breathing on arrival. CXR w B/L pulmonary congestion. Bedside Echo with reduced EF per cards fellow. Not on diuretic at home. CXR showing worsening congestion on 2/12  - No diuresis at this time due to shock picture    - Now intubated     GI  #GIB  With witnessed episode of melena, stool guiac positive at [facility] and received 1 u prbc, IV protonix 80 x 1 there. Pre transfusion hgb 10.4. With no additional episodes of melena, negative rectal exam. However with repeat hgb (post transfusion) at [facility] 9.8, tachycardia, hypotension, altered mental status. S/p 2 u pRBC. Patient had melanotic stool on 2/11 and 2 o/n into the 12th. EGD and CT A/P both found mass in the 2nd duodenum. EGD did not find source of bleeding   - Additional 1u pRBC on 2/12 due to Hgb of 9.7 this am   - C-scope today to find source of bleeding   - Protonix 40mg IVP BID  - NPO except meds  - 3 PIVs, two 22g, one 18g    ID  #Fever w/ leukocytosis  Pt experienced a 102 rectal temp on 2/11 along with the sustained leukocytosis. Leukocytosis may be in the setting of Type II MI and GIB.   - Bcx drawn on 2/11, NGTD       NEURO  W baseline mental status AAOx2. Currently AAOx1 likely in the setting of cardiogenic shock   - c/w home meds donepezil, memantine     RENAL  At [facility] Cr 1.12, with Cr .99 on arrival  - monitor BMP     ENDO  #DM2   A1c 6.7 this admission. On metformin 500 bid at home.  - c/w MISS      F - None  E - replete prn  N - NPO    DVT PPx - heparin ggt (given NSTEMI)    LINES - 3 PIVs, two 22s, one 18g. [facility] place 2/9      CODE - FULL    DISPO - CCU

## 2020-02-13 LAB
ALBUMIN SERPL ELPH-MCNC: 2.2 G/DL — LOW (ref 3.3–5)
ALP SERPL-CCNC: 47 U/L — SIGNIFICANT CHANGE UP (ref 40–120)
ALT FLD-CCNC: 21 U/L — SIGNIFICANT CHANGE UP (ref 10–45)
ANION GAP SERPL CALC-SCNC: 11 MMOL/L — SIGNIFICANT CHANGE UP (ref 5–17)
AST SERPL-CCNC: 30 U/L — SIGNIFICANT CHANGE UP (ref 10–40)
BASE EXCESS BLDA CALC-SCNC: -1 MMOL/L — SIGNIFICANT CHANGE UP (ref -2–3)
BASOPHILS # BLD AUTO: 0.03 K/UL — SIGNIFICANT CHANGE UP (ref 0–0.2)
BASOPHILS NFR BLD AUTO: 0.2 % — SIGNIFICANT CHANGE UP (ref 0–2)
BILIRUB SERPL-MCNC: 0.6 MG/DL — SIGNIFICANT CHANGE UP (ref 0.2–1.2)
BUN SERPL-MCNC: 16 MG/DL — SIGNIFICANT CHANGE UP (ref 7–23)
CALCIUM SERPL-MCNC: 7.4 MG/DL — LOW (ref 8.4–10.5)
CHLORIDE SERPL-SCNC: 108 MMOL/L — SIGNIFICANT CHANGE UP (ref 96–108)
CO2 SERPL-SCNC: 20 MMOL/L — LOW (ref 22–31)
CREAT SERPL-MCNC: 0.95 MG/DL — SIGNIFICANT CHANGE UP (ref 0.5–1.3)
EOSINOPHIL # BLD AUTO: 0.06 K/UL — SIGNIFICANT CHANGE UP (ref 0–0.5)
EOSINOPHIL NFR BLD AUTO: 0.5 % — SIGNIFICANT CHANGE UP (ref 0–6)
GLUCOSE SERPL-MCNC: 136 MG/DL — HIGH (ref 70–99)
HCO3 BLDA-SCNC: 22 MMOL/L — SIGNIFICANT CHANGE UP (ref 21–28)
HCT VFR BLD CALC: 26.6 % — LOW (ref 39–50)
HCT VFR BLD CALC: 27.1 % — LOW (ref 39–50)
HCT VFR BLD CALC: 27.9 % — LOW (ref 39–50)
HCT VFR BLD CALC: 28.2 % — LOW (ref 39–50)
HCT VFR BLD CALC: 28.3 % — LOW (ref 39–50)
HGB BLD-MCNC: 8.8 G/DL — LOW (ref 13–17)
HGB BLD-MCNC: 8.9 G/DL — LOW (ref 13–17)
HGB BLD-MCNC: 9 G/DL — LOW (ref 13–17)
HGB BLD-MCNC: 9 G/DL — LOW (ref 13–17)
HGB BLD-MCNC: 9.1 G/DL — LOW (ref 13–17)
IMM GRANULOCYTES NFR BLD AUTO: 0.4 % — SIGNIFICANT CHANGE UP (ref 0–1.5)
LACTATE SERPL-SCNC: 1 MMOL/L — SIGNIFICANT CHANGE UP (ref 0.5–2)
LACTATE SERPL-SCNC: 1.3 MMOL/L — SIGNIFICANT CHANGE UP (ref 0.5–2)
LACTATE SERPL-SCNC: 1.4 MMOL/L — SIGNIFICANT CHANGE UP (ref 0.5–2)
LACTATE SERPL-SCNC: 1.5 MMOL/L — SIGNIFICANT CHANGE UP (ref 0.5–2)
LYMPHOCYTES # BLD AUTO: 1.26 K/UL — SIGNIFICANT CHANGE UP (ref 1–3.3)
LYMPHOCYTES # BLD AUTO: 9.7 % — LOW (ref 13–44)
MAGNESIUM SERPL-MCNC: 2.3 MG/DL — SIGNIFICANT CHANGE UP (ref 1.6–2.6)
MCHC RBC-ENTMCNC: 29.7 PG — SIGNIFICANT CHANGE UP (ref 27–34)
MCHC RBC-ENTMCNC: 30.7 PG — SIGNIFICANT CHANGE UP (ref 27–34)
MCHC RBC-ENTMCNC: 31.9 GM/DL — LOW (ref 32–36)
MCHC RBC-ENTMCNC: 33.1 GM/DL — SIGNIFICANT CHANGE UP (ref 32–36)
MCV RBC AUTO: 92.7 FL — SIGNIFICANT CHANGE UP (ref 80–100)
MCV RBC AUTO: 93 FL — SIGNIFICANT CHANGE UP (ref 80–100)
MONOCYTES # BLD AUTO: 1.09 K/UL — HIGH (ref 0–0.9)
MONOCYTES NFR BLD AUTO: 8.4 % — SIGNIFICANT CHANGE UP (ref 2–14)
NEUTROPHILS # BLD AUTO: 10.45 K/UL — HIGH (ref 1.8–7.4)
NEUTROPHILS NFR BLD AUTO: 80.8 % — HIGH (ref 43–77)
NRBC # BLD: 0 /100 WBCS — SIGNIFICANT CHANGE UP (ref 0–0)
NRBC # BLD: 0 /100 WBCS — SIGNIFICANT CHANGE UP (ref 0–0)
PCO2 BLDA: 31 MMHG — LOW (ref 35–48)
PH BLDA: 7.47 — HIGH (ref 7.35–7.45)
PHOSPHATE SERPL-MCNC: 3.3 MG/DL — SIGNIFICANT CHANGE UP (ref 2.5–4.5)
PLATELET # BLD AUTO: 130 K/UL — LOW (ref 150–400)
PLATELET # BLD AUTO: 141 K/UL — LOW (ref 150–400)
PO2 BLDA: 176 MMHG — HIGH (ref 83–108)
POTASSIUM SERPL-MCNC: 3.6 MMOL/L — SIGNIFICANT CHANGE UP (ref 3.5–5.3)
POTASSIUM SERPL-SCNC: 3.6 MMOL/L — SIGNIFICANT CHANGE UP (ref 3.5–5.3)
PROT SERPL-MCNC: 4.8 G/DL — LOW (ref 6–8.3)
RBC # BLD: 2.87 M/UL — LOW (ref 4.2–5.8)
RBC # BLD: 3 M/UL — LOW (ref 4.2–5.8)
RBC # FLD: 15.9 % — HIGH (ref 10.3–14.5)
RBC # FLD: 16.3 % — HIGH (ref 10.3–14.5)
SAO2 % BLDA: 99 % — SIGNIFICANT CHANGE UP (ref 95–100)
SODIUM SERPL-SCNC: 139 MMOL/L — SIGNIFICANT CHANGE UP (ref 135–145)
WBC # BLD: 11.72 K/UL — HIGH (ref 3.8–10.5)
WBC # BLD: 12.94 K/UL — HIGH (ref 3.8–10.5)
WBC # FLD AUTO: 11.72 K/UL — HIGH (ref 3.8–10.5)
WBC # FLD AUTO: 12.94 K/UL — HIGH (ref 3.8–10.5)

## 2020-02-13 PROCEDURE — 99292 CRITICAL CARE ADDL 30 MIN: CPT

## 2020-02-13 PROCEDURE — 71045 X-RAY EXAM CHEST 1 VIEW: CPT | Mod: 26

## 2020-02-13 PROCEDURE — 93010 ELECTROCARDIOGRAM REPORT: CPT

## 2020-02-13 PROCEDURE — 99291 CRITICAL CARE FIRST HOUR: CPT

## 2020-02-13 RX ORDER — DOBUTAMINE HCL 250MG/20ML
3 VIAL (ML) INTRAVENOUS
Qty: 500 | Refills: 0 | Status: DISCONTINUED | OUTPATIENT
Start: 2020-02-13 | End: 2020-02-13

## 2020-02-13 RX ORDER — SODIUM CHLORIDE 9 MG/ML
1000 INJECTION, SOLUTION INTRAVENOUS
Refills: 0 | Status: DISCONTINUED | OUTPATIENT
Start: 2020-02-13 | End: 2020-02-13

## 2020-02-13 RX ORDER — POTASSIUM CHLORIDE 20 MEQ
40 PACKET (EA) ORAL ONCE
Refills: 0 | Status: COMPLETED | OUTPATIENT
Start: 2020-02-13 | End: 2020-02-13

## 2020-02-13 RX ORDER — ACETAMINOPHEN 500 MG
650 TABLET ORAL ONCE
Refills: 0 | Status: COMPLETED | OUTPATIENT
Start: 2020-02-13 | End: 2020-02-13

## 2020-02-13 RX ADMIN — Medication 2: at 17:04

## 2020-02-13 RX ADMIN — LATANOPROST 1 DROP(S): 0.05 SOLUTION/ DROPS OPHTHALMIC; TOPICAL at 21:57

## 2020-02-13 RX ADMIN — PANTOPRAZOLE SODIUM 40 MILLIGRAM(S): 20 TABLET, DELAYED RELEASE ORAL at 05:53

## 2020-02-13 RX ADMIN — PANTOPRAZOLE SODIUM 40 MILLIGRAM(S): 20 TABLET, DELAYED RELEASE ORAL at 19:09

## 2020-02-13 RX ADMIN — PROPOFOL 2.05 MICROGRAM(S)/KG/MIN: 10 INJECTION, EMULSION INTRAVENOUS at 00:21

## 2020-02-13 RX ADMIN — Medication 650 MILLIGRAM(S): at 17:30

## 2020-02-13 RX ADMIN — DONEPEZIL HYDROCHLORIDE 5 MILLIGRAM(S): 10 TABLET, FILM COATED ORAL at 21:57

## 2020-02-13 RX ADMIN — Medication 3 MILLILITER(S): at 06:02

## 2020-02-13 RX ADMIN — Medication 3 MILLILITER(S): at 15:17

## 2020-02-13 RX ADMIN — MEMANTINE HYDROCHLORIDE 10 MILLIGRAM(S): 10 TABLET ORAL at 05:53

## 2020-02-13 RX ADMIN — CHLORHEXIDINE GLUCONATE 15 MILLILITER(S): 213 SOLUTION TOPICAL at 05:53

## 2020-02-13 RX ADMIN — Medication 3 MILLILITER(S): at 21:12

## 2020-02-13 RX ADMIN — Medication 2: at 23:33

## 2020-02-13 RX ADMIN — Medication 40 MILLIEQUIVALENT(S): at 05:53

## 2020-02-13 RX ADMIN — SODIUM CHLORIDE 50 MILLILITER(S): 9 INJECTION, SOLUTION INTRAVENOUS at 09:35

## 2020-02-13 RX ADMIN — Medication 2: at 12:41

## 2020-02-13 RX ADMIN — CHLORHEXIDINE GLUCONATE 1 APPLICATION(S): 213 SOLUTION TOPICAL at 05:54

## 2020-02-13 RX ADMIN — MEMANTINE HYDROCHLORIDE 10 MILLIGRAM(S): 10 TABLET ORAL at 19:08

## 2020-02-13 RX ADMIN — ATORVASTATIN CALCIUM 40 MILLIGRAM(S): 80 TABLET, FILM COATED ORAL at 21:57

## 2020-02-13 RX ADMIN — Medication 650 MILLIGRAM(S): at 17:04

## 2020-02-13 RX ADMIN — LATANOPROST 1 DROP(S): 0.05 SOLUTION/ DROPS OPHTHALMIC; TOPICAL at 00:21

## 2020-02-13 NOTE — DIETITIAN INITIAL EVALUATION ADULT. - DIET TYPE
Prior to diet advancement, recommend bedside swallow evaluation, NPO prior. With passing results, recommend to advance as tolerated with PO consistency per SLP Recs: clears -> full liquids -> soft low fiber, DASH TLC, consCHO diet; fluids per team. Monitor for %PO intake and need for ONS.

## 2020-02-13 NOTE — DIETITIAN INITIAL EVALUATION ADULT. - ADD RECOMMEND
In the event alternate means of nutrition are indicated - either EN/PN, and consistent with GOC, Please reconsult for Recs PRN. Monitor Skin, Labs, wts, GI distress, GOC. RD to remain available for additional nutrition interventions as needed.

## 2020-02-13 NOTE — PROGRESS NOTE ADULT - SUBJECTIVE AND OBJECTIVE BOX
REDDY DANIELS  87y  Male    Patient is a 87y old  Male who presents with a chief complaint of Cardiogenic shock, GIB, NSTEMI (12 Feb 2020 11:56)      INTERVAL HPI/OVERNIGHT EVENTS: Patient had a c-scope yesterday that did not show any sources of bleeding with some diverticula. Temp last night was 100.7, patient was given tylenol. Propofol was increased to 4.9cc/hr and MAPs decreased to 55-60s, so the dobutamine was increased to 4cc/hr. His midnight Hgb was 9 with a lactate 1. CVP o/ was 5-6. UOP was about 12-20cc/hr o/n. Patient was extubated this morning and transitioned to BiPAP then to RA with good respiratory status. Dobutamine downtritated to 2cc/hr. Patient is complaining of some abdominal pain and chest pain s/p extubation, but denies trouble breathing.       T(C): 37.1 (02-13-20 @ 09:01), Max: 38.6 (02-12-20 @ 18:00)  HR: 91 (02-13-20 @ 10:00) (74 - 105)  BP: 85/51 (02-12-20 @ 22:00) (85/51 - 85/51)  RR: 24 (02-13-20 @ 10:00) (11 - 25)  SpO2: 100% (02-13-20 @ 10:00) (98% - 100%)  Wt(kg): --Vital Signs Last 24 Hrs  T(C): 37.1 (13 Feb 2020 09:01), Max: 38.6 (12 Feb 2020 18:00)  T(F): 98.8 (13 Feb 2020 09:01), Max: 101.4 (12 Feb 2020 18:00)  HR: 91 (13 Feb 2020 10:00) (74 - 105)  BP: 85/51 (12 Feb 2020 22:00) (85/51 - 85/51)  BP(mean): 63 (12 Feb 2020 22:00) (63 - 63)  RR: 24 (13 Feb 2020 10:00) (11 - 25)  SpO2: 100% (13 Feb 2020 10:00) (98% - 100%)    PHYSICAL EXAM:  GENERAL: NAD, s/p extubation  HEAD: Atraumatic, Normocephalic  EYES: Conjunctiva and sclera clear  ENMT: Moist mucous membranes  NECK: Supple, No JVD  NERVOUS SYSTEM: Alert able to follow commands and answer questions with head nods, unable to determine orientation due to inability to talk s/p extubation   CHEST/LUNG: Clear to auscultation bilaterally; No rales, rhonchi, wheezing, or rubs  HEART: Regular rate and rhythm; No murmurs, rubs, or gallops  ABDOMEN: Soft, Nontender, Nondistended; Bowel sounds present  EXTREMITIES: WWP, No clubbing, cyanosis, or edema  Vascular: 2+ peripheral pulses x4    Consultant(s) Notes Reviewed:  [x ] YES  [ ] NO  Care Discussed with Consultants/Other Providers [ x] YES  [ ] NO    LABS:                        8.8    12.94 )-----------( 130      ( 13 Feb 2020 04:50 )             26.6     02-13    139  |  108  |  16  ----------------------------<  136<H>  3.6   |  20<L>  |  0.95    Ca    7.4<L>      13 Feb 2020 04:50  Phos  3.3     02-13  Mg     2.3     02-13    TPro  4.8<L>  /  Alb  2.2<L>  /  TBili  0.6  /  DBili  x   /  AST  30  /  ALT  21  /  AlkPhos  47  02-13          CAPILLARY BLOOD GLUCOSE      POCT Blood Glucose.: 124 mg/dL (13 Feb 2020 06:13)  POCT Blood Glucose.: 148 mg/dL (13 Feb 2020 05:49)  POCT Blood Glucose.: 149 mg/dL (13 Feb 2020 00:09)  POCT Blood Glucose.: 166 mg/dL (12 Feb 2020 17:52)  POCT Blood Glucose.: 169 mg/dL (12 Feb 2020 12:01)      ABG - ( 13 Feb 2020 10:24 )  pH, Arterial: 7.47  pH, Blood: x     /  pCO2: 31    /  pO2: 176   / HCO3: 22    / Base Excess: -1.0  /  SaO2: 99                    RADIOLOGY & ADDITIONAL TESTS:    Imaging Personally Reviewed:  [ ] YES  [ ] NO    HEALTH ISSUES - PROBLEM Dx:

## 2020-02-13 NOTE — PROGRESS NOTE ADULT - ASSESSMENT
87M w pmh CAD, hx CABG approx 10 years ago, DM2, hernia, dementia (AAOx2 at baseline) presents for NSTEMI, cardiogenic shock, GIB admitted to CCU for further management.     PLAN:     CARDIOVASCULAR  #Cardiogenic shock vs less likely hypovolemic shock from GIB  With known CAD, Hx of CABG. On arrival with BP 79/61, elevated lactate and LFTs, cool lower extremities, cool lower ext, altered mental status compared to baseline with CXR showing pulmonary congestion. ECHO showing EF 25%. CVP of 9 indicating less likely hypovolemia. Extremities warmer on exam today, UOP still oliguric (minimal intake though)   - Hgb 9.1 at 11am  - 11am lactate 1.3  - Down titrate dobutamine drip, monitor for presence of PVCs as patient has been having more PVCs on dobutamine that cause a transient drop in MAP on A-line  - Once dobutamine is off, will check lactate and CBC 4 hours after   - Initiating 50cc/hr of D5 1/2NS, will do serial lung checks to try and improve UOP       #Type II MI in the setting of GIB  EKG w anterolateral lead ST depressions w avR elevations with CAD and hx of CABG in 2002 with 2/3 grafts now occluded as of 2009. Given avR elevation possible LM disease. With concern for GIB, however rectal exam negative for blood. Trop T at [facility] 11.2. At [facility] Trop T .94, CKMB 33.2. Hypovolemia 2/2 from the GIB is causing a demand ischemia leading to elevated cardiac enzymes. Two melanotic stool o/n (2/11-2/12). Trops and CKM have been trended to peak. Hgb now stable at 9.1.   - C/w holding Heparin, ASa, and plavix in the setting of GIB (may be resolved now that Hgb is stable)  - F/u GI recs   - c/w lipitor 40mg  - F/u CBC 4 hours after discontinuation of dobutamine         PULMONARY  #Pulmonary Edema  With B/L crackles, tachypnea, belly breathing on arrival. CXR w B/L pulmonary congestion. Bedside Echo with reduced EF per cards fellow. Not on diuretic at home. CXR showing improvement from 2/12, lungs sound clear on exam  - Initiating 50cc/hr of D5 1/2NS, will do serial lung checks   - No diuresis at this time due to shock picture    - Extubated doing well on RA    GI  #GIB  With witnessed episode of melena, stool guiac positive at [facility] and received 1 u prbc, IV protonix 80 x 1 there. Pre transfusion hgb 10.4. With no additional episodes of melena, negative rectal exam. However with repeat hgb (post transfusion) at [facility] 9.8, tachycardia, hypotension, altered mental status. S/p 2 u pRBC. Patient had melanotic stool on 2/11 and 2 o/n into the 12th. EGD and CT A/P both found mass in the 2nd duodenum. EGD did not find source of bleeding. c-scope found no sources of bleeding with some diverticula   - Hgb 9.1 this am   - Protonix 40mg IVP BID  - NPO due to failure to pass dysphagia screen   - 3 PIVs, two 22g, one 18g    ID  #Fever w/ leukocytosis  Pt experienced a 102 rectal temp on 2/11 along with the sustained leukocytosis. Leukocytosis may be in the setting of Type II MI and GIB. Leukocytosis is downtrending, fever curve is downtrending 100.7 on 2/12.  - Monitoring possible RML infiltrate that has been relatively stable since the 2/11  - Bcx drawn on 2/11, NGTD         NEURO  W baseline mental status AAOx2. Currently AAOx1 likely in the setting of cardiogenic shock   - c/w home meds donepezil, memantine     RENAL  At [facility] Cr 1.12, with Cr .99 on arrival  - monitor BMP     ENDO  #DM2   A1c 6.7 this admission. On metformin 500 bid at home.  - c/w MISS      F - None  E - replete prn  N - NPO    DVT PPx - heparin ggt (given NSTEMI)    LINES - 3 PIVs, two 22s, one 18g. [facility] place 2/9      CODE - FULL    DISPO - CCU

## 2020-02-13 NOTE — DIETITIAN INITIAL EVALUATION ADULT. - OTHER INFO
88yo M, HX of CAD, hx CABG approx 10 years ago, DM2, hernia, dementia (AAOx2 at baseline). Pt with AMS PTA, presents for NSTEMI, cardiogenic shock, Acute CHF EF 25%. Pt with GIB (PTA witnessed episode of melena, stool guiac positive at [facility]), however EGD did not find source of bleeding, +duodenal mass; c-scope found no sources of bleeding with some diverticula. Pt in CCU - intubated in endo suite, extubated this morning and transitioned to BiPAP then to RA with good respiratory status. Per flow sheets, denies pain/discomfort. +1BL Ankle edema. GI: BM+ 2/12, 2/11; nondistended rounded soft NT. No pressure ulcers.   Pt visited, unable to participate in interview 2/2 current medical status. Pt NPO at this time, RN reports pt with cough during bed side dysphagia screen today s/p extubation. Noted prior to intubation order for DASH consCHO no snack puree diet 2/10-2/12.  Please see below for nutritions recommendations. Recs made with team. 86yo M, HX of CAD, hx CABG approx 10 years ago, DM2, hernia, dementia (AAOx2 at baseline). Pt with AMS PTA, presents for NSTEMI, cardiogenic shock, Acute CHF EF 25%. Pt with GIB (PTA witnessed episode of melena, stool guiac positive at [facility]), however EGD did not find source of bleeding, +duodenal mass; c-scope found no sources of bleeding with some diverticula. Pt in CCU - intubated in endo suite, extubated this morning and transitioned to BiPAP then to RA with good respiratory status. Per flow sheets, denies pain/discomfort. +1BL Ankle edema. GI: BM+ 2/12, 2/11; nondistended rounded soft NT. No pressure ulcers.   Pt visited, unable to participate in interview 2/2 current medical status. Pt NPO at this time, RN reports pt with cough during bed side dysphagia screen today s/p extubation. Noted prior to intubation order for DASH consCHO no snack puree diet 2/10-2/12.  Please see below for nutritions recommendations. Paged team for Recs.

## 2020-02-13 NOTE — PROGRESS NOTE ADULT - ASSESSMENT
87yr old M with PMHx significant for CAD (on ASA and plavix), hx CABG (10 years ago), Type 2 DM, abdominal hernia, dementia (AAOx2 at baseline) who presented to [place] with confusion and AMS according to HHA and wife. Pt transferred to [facility] and admitted to CCU for NSTEMI and possible cardiogenic shock.  GI consulted for melena and r/o Gastrointestinal bleed.     # Melena -   - sp EGD 2/11/2020 - with gastritis, edematous pylorus, duodenitis, mass in duodenum  - sp bedside EGD 2/12/2020 - with old heme, no source of active bleeding seen  - Hgb relatively stable, no further bleeding reported so far  - Continue to trend Hgb  - will hold off on EUS of duodenal mass at this point - discussed with Dr Jacobson  - monitor for further bleeding      Discussed with attending Dr Elliott 87yr old M with PMHx significant for CAD (on ASA and plavix), hx CABG (10 years ago), Type 2 DM, abdominal hernia, dementia (AAOx2 at baseline) who presented to [place] with confusion and AMS according to HHA and wife. Pt transferred to [facility] and admitted to CCU for NSTEMI and possible cardiogenic shock.  GI consulted for melena and r/o Gastrointestinal bleed.     # Melena -   - sp EGD 2/11/2020 - with gastritis, edematous pylorus, duodenitis, mass in duodenum  - sp bedside EGD 2/12/2020 - with old heme, no source of active bleeding seen  - Hgb relatively stable, no further bleeding reported so far  - Continue to trend Hgb  - will hold off on EUS of duodenal mass at this point - discussed with Dr Jacobson  - monitor for further bleeding      Discussed with attending Dr Elliott  GI will sign off for now, please reconsult us as needed

## 2020-02-13 NOTE — PROGRESS NOTE ADULT - SUBJECTIVE AND OBJECTIVE BOX
Pt seen and examined at bedside  No new c/o  No more episodes of bleeding  Now extubated  No n/v/d, abdominal pain      MEDICATIONS:  MEDICATIONS  (STANDING):  albuterol/ipratropium for Nebulization. 3 milliLiter(s) Nebulizer every 6 hours  atorvastatin 40 milliGRAM(s) Oral at bedtime  chlorhexidine 2% Cloths 1 Application(s) Topical <User Schedule>  dextrose 5% + sodium chloride 0.45%. 1000 milliLiter(s) (50 mL/Hr) IV Continuous <Continuous>  dextrose 5%. 1000 milliLiter(s) (50 mL/Hr) IV Continuous <Continuous>  dextrose 50% Injectable 12.5 Gram(s) IV Push once  dextrose 50% Injectable 25 Gram(s) IV Push once  dextrose 50% Injectable 25 Gram(s) IV Push once  donepezil 5 milliGRAM(s) Oral at bedtime  finasteride 5 milliGRAM(s) Oral daily  insulin lispro (HumaLOG) corrective regimen sliding scale   SubCutaneous every 6 hours  latanoprost 0.005% Ophthalmic Solution 1 Drop(s) Right EYE at bedtime  memantine 10 milliGRAM(s) Oral two times a day  pantoprazole  Injectable 40 milliGRAM(s) IV Push every 12 hours    MEDICATIONS  (PRN):  dextrose 40% Gel 15 Gram(s) Oral once PRN Blood Glucose LESS THAN 70 milliGRAM(s)/deciliter  glucagon  Injectable 1 milliGRAM(s) IntraMuscular once PRN Glucose LESS THAN 70 milligrams/deciliter      Allergies  No Known Allergies    Intolerances      Vital Signs Last 24 Hrs  T(C): 37.6 (13 Feb 2020 13:00), Max: 38.6 (12 Feb 2020 18:00)  T(F): 99.6 (13 Feb 2020 13:00), Max: 101.4 (12 Feb 2020 18:00)  HR: 97 (13 Feb 2020 14:00) (74 - 97)  BP: 85/51 (12 Feb 2020 22:00) (85/51 - 85/51)  BP(mean): 63 (12 Feb 2020 22:00) (63 - 63)  RR: 29 (13 Feb 2020 14:00) (12 - 29)  SpO2: 92% (13 Feb 2020 14:00) (92% - 100%)    02-12 @ 07:01 - 02-13 @ 07:00  --------------------------------------------------------  IN: 695.2 mL / OUT: 1040 mL / NET: -344.8 mL    02-13 @ 07:01 - 02-13 @ 15:47  --------------------------------------------------------  IN: 267.7 mL / OUT: 173 mL / NET: 94.7 mL      PHYSICAL EXAM:  General: Well developed; well nourished; in no acute distress  Abdomen: full soft, BS+, healed incision scar around to umbilicus from prior surgery, normoactive BS, no tenderness to light or deep palpation in any of the quadrants, no hepatosplenomegaly, no guarding or rigidity.   Extremities: trace pitting edema of LE bilaterally   Neurological: Awake and alert, pleasantly demented  Skin: Warm and dry. No obvious rash      LABS:  ABG - ( 13 Feb 2020 10:24 )  pH, Arterial: 7.47  pH, Blood: x     /  pCO2: 31    /  pO2: 176   / HCO3: 22    / Base Excess: -1.0  /  SaO2: 99        CBC Full  -  ( 13 Feb 2020 14:42 )  WBC Count : x  RBC Count : x  Hemoglobin : 9.0 g/dL  Hematocrit : 28.2 %    139  |  108  |  16  ----------------------------<  136<H>  3.6   |  20<L>  |  0.95    Ca    7.4<L>      13 Feb 2020 04:50  Phos  3.3     02-13  Mg     2.3     02-13    TPro  4.8<L>  /  Alb  2.2<L>  /  TBili  0.6  /  DBili  x   /  AST  30  /  ALT  21  /  AlkPhos  47  02-13            RADIOLOGY & ADDITIONAL STUDIES (The following images were personally reviewed):  CT Abdomen and Pelvis w/ Oral Cont and w/ IV Cont (02.11.20 @ 22:22)   IMPRESSION:  1.  9 mm polypoid intraluminal mass in the second portion of the duodenum near the ampulla. No compelling evidence of metastatic disease.  2.  Enteric tube side port is at thegastroesophageal junction. Recommend advancing.  3.  Small bilateral pleural effusions.

## 2020-02-14 LAB
ANION GAP SERPL CALC-SCNC: 11 MMOL/L — SIGNIFICANT CHANGE UP (ref 5–17)
BASE EXCESS BLDA CALC-SCNC: 0.1 MMOL/L — SIGNIFICANT CHANGE UP (ref -2–3)
BASOPHILS # BLD AUTO: 0.02 K/UL — SIGNIFICANT CHANGE UP (ref 0–0.2)
BASOPHILS NFR BLD AUTO: 0.2 % — SIGNIFICANT CHANGE UP (ref 0–2)
BLD GP AB SCN SERPL QL: NEGATIVE — SIGNIFICANT CHANGE UP
BUN SERPL-MCNC: 21 MG/DL — SIGNIFICANT CHANGE UP (ref 7–23)
CALCIUM SERPL-MCNC: 7.7 MG/DL — LOW (ref 8.4–10.5)
CHLORIDE SERPL-SCNC: 108 MMOL/L — SIGNIFICANT CHANGE UP (ref 96–108)
CO2 SERPL-SCNC: 22 MMOL/L — SIGNIFICANT CHANGE UP (ref 22–31)
CREAT SERPL-MCNC: 1.07 MG/DL — SIGNIFICANT CHANGE UP (ref 0.5–1.3)
EOSINOPHIL # BLD AUTO: 0.08 K/UL — SIGNIFICANT CHANGE UP (ref 0–0.5)
EOSINOPHIL NFR BLD AUTO: 0.7 % — SIGNIFICANT CHANGE UP (ref 0–6)
GAS PNL BLDA: SIGNIFICANT CHANGE UP
GLUCOSE SERPL-MCNC: 134 MG/DL — HIGH (ref 70–99)
HCO3 BLDA-SCNC: 23 MMOL/L — SIGNIFICANT CHANGE UP (ref 21–28)
HCT VFR BLD CALC: 28.4 % — LOW (ref 39–50)
HCT VFR BLD CALC: 28.7 % — LOW (ref 39–50)
HGB BLD-MCNC: 9.1 G/DL — LOW (ref 13–17)
HGB BLD-MCNC: 9.3 G/DL — LOW (ref 13–17)
IMM GRANULOCYTES NFR BLD AUTO: 0.6 % — SIGNIFICANT CHANGE UP (ref 0–1.5)
LACTATE SERPL-SCNC: 1.4 MMOL/L — SIGNIFICANT CHANGE UP (ref 0.5–2)
LACTATE SERPL-SCNC: 1.7 MMOL/L — SIGNIFICANT CHANGE UP (ref 0.5–2)
LEGIONELLA AG UR QL: NEGATIVE — SIGNIFICANT CHANGE UP
LYMPHOCYTES # BLD AUTO: 1.19 K/UL — SIGNIFICANT CHANGE UP (ref 1–3.3)
LYMPHOCYTES # BLD AUTO: 9.8 % — LOW (ref 13–44)
MAGNESIUM SERPL-MCNC: 2.4 MG/DL — SIGNIFICANT CHANGE UP (ref 1.6–2.6)
MCHC RBC-ENTMCNC: 30 PG — SIGNIFICANT CHANGE UP (ref 27–34)
MCHC RBC-ENTMCNC: 32 GM/DL — SIGNIFICANT CHANGE UP (ref 32–36)
MCV RBC AUTO: 93.7 FL — SIGNIFICANT CHANGE UP (ref 80–100)
MONOCYTES # BLD AUTO: 0.98 K/UL — HIGH (ref 0–0.9)
MONOCYTES NFR BLD AUTO: 8.1 % — SIGNIFICANT CHANGE UP (ref 2–14)
NEUTROPHILS # BLD AUTO: 9.81 K/UL — HIGH (ref 1.8–7.4)
NEUTROPHILS NFR BLD AUTO: 80.6 % — HIGH (ref 43–77)
NRBC # BLD: 0 /100 WBCS — SIGNIFICANT CHANGE UP (ref 0–0)
PCO2 BLDA: 31 MMHG — LOW (ref 35–48)
PH BLDA: 7.49 — HIGH (ref 7.35–7.45)
PHOSPHATE SERPL-MCNC: 2.5 MG/DL — SIGNIFICANT CHANGE UP (ref 2.5–4.5)
PLATELET # BLD AUTO: 145 K/UL — LOW (ref 150–400)
PO2 BLDA: 116 MMHG — HIGH (ref 83–108)
POTASSIUM SERPL-MCNC: 4.4 MMOL/L — SIGNIFICANT CHANGE UP (ref 3.5–5.3)
POTASSIUM SERPL-SCNC: 4.4 MMOL/L — SIGNIFICANT CHANGE UP (ref 3.5–5.3)
PROCALCITONIN SERPL-MCNC: 0.1 NG/ML — SIGNIFICANT CHANGE UP (ref 0.02–0.1)
RBC # BLD: 3.03 M/UL — LOW (ref 4.2–5.8)
RBC # FLD: 16 % — HIGH (ref 10.3–14.5)
RH IG SCN BLD-IMP: POSITIVE — SIGNIFICANT CHANGE UP
SAO2 % BLDA: 99 % — SIGNIFICANT CHANGE UP (ref 95–100)
SODIUM SERPL-SCNC: 141 MMOL/L — SIGNIFICANT CHANGE UP (ref 135–145)
WBC # BLD: 12.15 K/UL — HIGH (ref 3.8–10.5)
WBC # FLD AUTO: 12.15 K/UL — HIGH (ref 3.8–10.5)

## 2020-02-14 PROCEDURE — 71045 X-RAY EXAM CHEST 1 VIEW: CPT | Mod: 26

## 2020-02-14 PROCEDURE — 99291 CRITICAL CARE FIRST HOUR: CPT

## 2020-02-14 RX ORDER — AZITHROMYCIN 500 MG/1
500 TABLET, FILM COATED ORAL DAILY
Refills: 0 | Status: COMPLETED | OUTPATIENT
Start: 2020-02-14 | End: 2020-02-17

## 2020-02-14 RX ORDER — ASPIRIN/CALCIUM CARB/MAGNESIUM 324 MG
81 TABLET ORAL DAILY
Refills: 0 | Status: DISCONTINUED | OUTPATIENT
Start: 2020-02-15 | End: 2020-02-28

## 2020-02-14 RX ORDER — CEFTRIAXONE 500 MG/1
1000 INJECTION, POWDER, FOR SOLUTION INTRAMUSCULAR; INTRAVENOUS EVERY 24 HOURS
Refills: 0 | Status: COMPLETED | OUTPATIENT
Start: 2020-02-14 | End: 2020-02-18

## 2020-02-14 RX ORDER — FUROSEMIDE 40 MG
20 TABLET ORAL ONCE
Refills: 0 | Status: COMPLETED | OUTPATIENT
Start: 2020-02-14 | End: 2020-02-14

## 2020-02-14 RX ORDER — POLYETHYLENE GLYCOL 3350 17 G/17G
17 POWDER, FOR SOLUTION ORAL
Refills: 0 | Status: DISCONTINUED | OUTPATIENT
Start: 2020-02-14 | End: 2020-02-28

## 2020-02-14 RX ADMIN — Medication 3 MILLILITER(S): at 21:05

## 2020-02-14 RX ADMIN — Medication 3 MILLILITER(S): at 09:55

## 2020-02-14 RX ADMIN — Medication 2: at 17:50

## 2020-02-14 RX ADMIN — ATORVASTATIN CALCIUM 40 MILLIGRAM(S): 80 TABLET, FILM COATED ORAL at 22:34

## 2020-02-14 RX ADMIN — AZITHROMYCIN 500 MILLIGRAM(S): 500 TABLET, FILM COATED ORAL at 13:43

## 2020-02-14 RX ADMIN — FINASTERIDE 5 MILLIGRAM(S): 5 TABLET, FILM COATED ORAL at 12:21

## 2020-02-14 RX ADMIN — POLYETHYLENE GLYCOL 3350 17 GRAM(S): 17 POWDER, FOR SOLUTION ORAL at 18:25

## 2020-02-14 RX ADMIN — Medication 3 MILLILITER(S): at 05:59

## 2020-02-14 RX ADMIN — PANTOPRAZOLE SODIUM 40 MILLIGRAM(S): 20 TABLET, DELAYED RELEASE ORAL at 18:25

## 2020-02-14 RX ADMIN — CHLORHEXIDINE GLUCONATE 1 APPLICATION(S): 213 SOLUTION TOPICAL at 06:18

## 2020-02-14 RX ADMIN — PANTOPRAZOLE SODIUM 40 MILLIGRAM(S): 20 TABLET, DELAYED RELEASE ORAL at 06:19

## 2020-02-14 RX ADMIN — DONEPEZIL HYDROCHLORIDE 5 MILLIGRAM(S): 10 TABLET, FILM COATED ORAL at 22:34

## 2020-02-14 RX ADMIN — Medication 3 MILLILITER(S): at 16:42

## 2020-02-14 RX ADMIN — MEMANTINE HYDROCHLORIDE 10 MILLIGRAM(S): 10 TABLET ORAL at 06:18

## 2020-02-14 RX ADMIN — Medication 2: at 22:59

## 2020-02-14 RX ADMIN — Medication 2: at 12:20

## 2020-02-14 RX ADMIN — MEMANTINE HYDROCHLORIDE 10 MILLIGRAM(S): 10 TABLET ORAL at 18:25

## 2020-02-14 RX ADMIN — Medication 20 MILLIGRAM(S): at 10:37

## 2020-02-14 RX ADMIN — LATANOPROST 1 DROP(S): 0.05 SOLUTION/ DROPS OPHTHALMIC; TOPICAL at 22:34

## 2020-02-14 RX ADMIN — CEFTRIAXONE 100 MILLIGRAM(S): 500 INJECTION, POWDER, FOR SOLUTION INTRAMUSCULAR; INTRAVENOUS at 12:28

## 2020-02-14 NOTE — PROGRESS NOTE ADULT - ASSESSMENT
87M w pmh CAD, hx CABG approx 10 years ago, DM2, hernia, dementia (AAOx2 at baseline) presents for NSTEMI, cardiogenic shock, GIB admitted to CCU for further management.     PLAN:     CARDIOVASCULAR  #Cardiogenic shock vs less likely hypovolemic shock from GIB  With known CAD, Hx of CABG. On arrival with BP 79/61, elevated lactate and LFTs, cool lower extremities, cool lower ext, altered mental status compared to baseline with CXR showing pulmonary congestion. ECHO showing EF 25%. CVP of 9 indicating less likely hypovolemia. Feet are cool on exam B/L. Lactate on 2/13 PM was 1.5. S/p dobutamine drip (stopped on AM 2/13)  - MAPs remaining in the 60s  - UOP picking up s/p 20mg IVP lasix  - F/u noon lactate       #NSTEMI vs Type II MI in the setting of GIB  EKG w anterolateral lead ST depressions w avR elevations with CAD and hx of CABG in 2002 with 2/3 grafts now occluded as of 2009. Given avR elevation possible LM disease. With concern for GIB, however rectal exam negative for blood. Trop T at [facility] 11.2. At [facility] Trop T .94, CKMB 33.2. Hypovolemia 2/2 from the GIB is causing a demand ischemia leading to elevated cardiac enzymes. Two melanotic stool o/n (2/11-2/12). Trops and CKM have been trended to peak. Hgb now stable at 9.1.   - C/w holding Heparin, ASa, and plavix in the setting of GIB (may be resolved now that Hgb is stable)  - c/w lipitor 40mg  - F/u CBC at 5pm   - Once patient is stabilized will consult with interventional about possible cath, low likelihood of cath occurring due to risk of GIB reoccurring        PULMONARY  #Pulmonary Edema  With B/L crackles, tachypnea, belly breathing on arrival. CXR w B/L pulmonary congestion. Bedside Echo with reduced EF per cards fellow. Not on diuretic at home.   - CXR showing some mild worsening of congestion   - Lasix 20mg IVP given with appropriate urine output        GI  #GIB  With witnessed episode of melena, stool guiac positive at [facility] and received 1 u prbc, IV protonix 80 x 1 there. Pre transfusion hgb 10.4. With no additional episodes of melena, negative rectal exam. However with repeat hgb (post transfusion) at [facility] 9.8, tachycardia, hypotension, altered mental status. S/p 2 u pRBC. Patient had melanotic stool on 2/11 and 2 o/n into the 12th. EGD and CT A/P both found mass in the 2nd duodenum. EGD did not find source of bleeding. c-scope found no sources of bleeding with some diverticula   - Hgb 9.1 this am   - Protonix 40mg IVP BID  - NPO due to failure to pass dysphagia screen   - 3 PIVs, two 22g, one 18g    ID  #Fever w/ leukocytosis possibly 2/2 to CAP   Pt experienced a 101.6 rectal temp on 2/11 along with the sustained leukocytosis. Leukocytosis may be in the setting of Type II MI and GIB. Leukocytosis is downtrending, fever curve is downtrending 100.7 on 2/12, then 100.2 on 2/13  - Monitoring possible RML infiltrate that has been relatively stable since the 2/11  - Bcx drawn on 2/11, NGTD         NEURO  W baseline mental status AAOx2. Currently AAOx1 likely in the setting of cardiogenic shock   - c/w home meds donepezil, memantine     RENAL  At [facility] Cr 1.12, with Cr .99 on arrival  - monitor BMP     ENDO  #DM2   A1c 6.7 this admission. On metformin 500 bid at home.  - c/w MISS      F - None  E - replete prn  N - NPO    DVT PPx - heparin ggt (given NSTEMI)    LINES - 3 PIVs, two 22s, one 18g. RIJ place 2/9      CODE - FULL    DISPO - CCU 87M w pmh CAD, hx CABG approx 10 years ago, DM2, hernia, dementia (AAOx2 at baseline) presents for NSTEMI, cardiogenic shock, GIB admitted to CCU for further management.     PLAN:     CARDIOVASCULAR  #Cardiogenic shock vs less likely hypovolemic shock from GIB  With known CAD, Hx of CABG. On arrival with BP 79/61, elevated lactate and LFTs, cool lower extremities, cool lower ext, altered mental status compared to baseline with CXR showing pulmonary congestion. ECHO showing EF 25%. CVP of 9 indicating less likely hypovolemia. Feet are cool on exam B/L. Lactate on 2/13 PM was 1.5. S/p dobutamine drip (stopped on AM 2/13)  - MAPs remaining in the 60s  - UOP picking up s/p 20mg IVP lasix  - F/u noon lactate       #NSTEMI vs Type II MI in the setting of GIB  EKG w anterolateral lead ST depressions w avR elevations with CAD and hx of CABG in 2002 with 2/3 grafts now occluded as of 2009. Given avR elevation possible LM disease. With concern for GIB, however rectal exam negative for blood. Trop T at [facility] 11.2. At [facility] Trop T .94, CKMB 33.2. Hypovolemia 2/2 from the GIB is causing a demand ischemia leading to elevated cardiac enzymes. Two melanotic stool o/n (2/11-2/12). Trops and CKM have been trended to peak. Hgb now stable at 9.1.   - C/w holding Heparin, ASa, and plavix in the setting of GIB (may be resolved now that Hgb is stable)  - c/w lipitor 40mg  - F/u CBC at 5pm   - Once patient is stabilized will consult with interventional about possible cath, low likelihood of cath occurring due to risk of GIB reoccurring        PULMONARY  #Pulmonary Edema  With B/L crackles, tachypnea, belly breathing on arrival. CXR w B/L pulmonary congestion. Bedside Echo with reduced EF per cards fellow. Not on diuretic at home. Bibasilar crackles on exam.   - CXR showing some mild worsening of congestion   - Lasix 20mg IVP given with appropriate urine output    - monitor urine output       GI  #GIB  With witnessed episode of melena, stool guiac positive at [facility] and received 1 u prbc, IV protonix 80 x 1 there. Pre transfusion hgb 10.4. With no additional episodes of melena, negative rectal exam. However with repeat hgb (post transfusion) at [facility] 9.8, tachycardia, hypotension, altered mental status. S/p 2 u pRBC. Patient had melanotic stool on 2/11 and 2 o/n into the 12th. EGD and CT A/P both found mass in the 2nd duodenum. EGD did not find source of bleeding. c-scope found no sources of bleeding with some diverticula   - Hgb 9.1 this am   - Protonix 40mg IVP BID  - NPO due to failure to pass dysphagia screen   - 3 PIVs, two 22g, one 18g    ID  #Fever w/ leukocytosis possibly 2/2 to CAP   Pt experienced a 101.6 rectal temp on 2/11 along with the sustained leukocytosis. Leukocytosis may be in the setting of Type II MI and GIB. Leukocytosis is downtrending, fever curve is downtrending 100.7 on 2/12, then 100.2 on 2/13  - Possible RML infiltrate that has been relatively stable since the 2/11  - Due to consistent fevers that are on a downward trend we will starting CFTX 1g x5 days and Azithro 500mg x 3 days  for CAP coverage, despite QTc being elongated on the EKG the patient has a RBBB and was deemed ok to give azithro  - F/u MRSA swab  - F/u Urine legionella   - Pro-brynn .1   - Bcx drawn on 2/11, NGTD         NEURO  W baseline mental status AAOx2. Currently AAOx1 likely in the setting of cardiogenic shock   - c/w home meds donepezil, memantine     RENAL  At [facility] Cr 1.12, with Cr .99 on arrival  - monitor BMP     ENDO  #DM2   A1c 6.7 this admission. On metformin 500 bid at home.  - c/w MISS      F - None  E - replete prn  N - NPO    DVT PPx - heparin ggt (given NSTEMI)    LINES - 3 PIVs, two 22s, one 18g. RIJ place 2/9      CODE - FULL    DISPO - CCU 87M w pmh CAD, hx CABG approx 10 years ago, DM2, hernia, dementia (AAOx2 at baseline) presents for NSTEMI, cardiogenic shock, GIB admitted to CCU for further management.     PLAN:     CARDIOVASCULAR  #Cardiogenic shock vs less likely hypovolemic shock from GIB  With known CAD, Hx of CABG. On arrival with BP 79/61, elevated lactate and LFTs, cool lower extremities, cool lower ext, altered mental status compared to baseline with CXR showing pulmonary congestion. ECHO showing EF 25%. CVP of 9 indicating less likely hypovolemia. Feet are cool on exam B/L. Lactate on 2/13 PM was 1.5. S/p dobutamine drip (stopped on AM 2/13)  - MAPs remaining in the 60s  - UOP picking up s/p 20mg IVP lasix  - F/u noon lactate       #NSTEMI vs Type II MI in the setting of GIB  EKG w anterolateral lead ST depressions w avR elevations with CAD and hx of CABG in 2002 with 2/3 grafts now occluded as of 2009. Given avR elevation possible LM disease. With concern for GIB, however rectal exam negative for blood. Trop T at [facility] 11.2. At [facility] Trop T .94, CKMB 33.2. Hypovolemia 2/2 from the GIB is causing a demand ischemia leading to elevated cardiac enzymes. Two melanotic stool o/n (2/11-2/12). Trops and CKM have been trended to peak. Hgb now stable at 9.1.   - C/w holding Heparin, ASa, and plavix in the setting of GIB (may be resolved now that Hgb is stable)  - c/w lipitor 40mg  - F/u CBC at 5pm   - Once patient is stabilized will consult with interventional about possible cath, low likelihood of cath occurring due to risk of GIB reoccurring    #HFrEF  ECHO with EF of 25%  - As patient's HD picture becomes more stable GDMT will be initiated  - 20mg lasix IVP with good urine output response         PULMONARY  #Pulmonary Edema  With B/L crackles, tachypnea, belly breathing on arrival. CXR w B/L pulmonary congestion. Bedside Echo with reduced EF per cards fellow. Not on diuretic at home. Bibasilar crackles on exam.   - CXR showing some mild worsening of congestion   - Lasix 20mg IVP given with appropriate urine output    - monitor urine output       GI  #GIB  With witnessed episode of melena, stool guiac positive at [facility] and received 1 u prbc, IV protonix 80 x 1 there. Pre transfusion hgb 10.4. With no additional episodes of melena, negative rectal exam. However with repeat hgb (post transfusion) at [facility] 9.8, tachycardia, hypotension, altered mental status. S/p 2 u pRBC. Patient had melanotic stool on 2/11 and 2 o/n into the 12th. EGD and CT A/P both found mass in the 2nd duodenum. EGD did not find source of bleeding. c-scope found no sources of bleeding with some diverticula   - Hgb 9.1 this am   - Protonix 40mg IVP BID  - NPO due to failure to pass dysphagia screen   - 3 PIVs, two 22g, one 18g    ID  #Fever w/ leukocytosis possibly 2/2 to CAP   Pt experienced a 101.6 rectal temp on 2/11 along with the sustained leukocytosis. Leukocytosis may be in the setting of Type II MI and GIB. Leukocytosis is downtrending, fever curve is downtrending 100.7 on 2/12, then 100.2 on 2/13  - Possible RML infiltrate that has been relatively stable since the 2/11  - Due to consistent fevers that are on a downward trend we will starting CFTX 1g x5 days and Azithro 500mg x 3 days  for CAP coverage, despite QTc being elongated on the EKG the patient has a RBBB and was deemed ok to give azithro  - F/u MRSA swab  - F/u Urine legionella   - Pro-brynn .1   - Bcx drawn on 2/11, NGTD         NEURO  W baseline mental status AAOx2. Currently AAOx1 likely in the setting of cardiogenic shock   - c/w home meds donepezil, memantine     RENAL  At [facility] Cr 1.12, with Cr .99 on arrival  - monitor BMP     ENDO  #DM2   A1c 6.7 this admission. On metformin 500 bid at home.  - c/w MISS      F - None  E - replete prn  N - NPO    DVT PPx - heparin ggt (given NSTEMI)    LINES - 3 PIVs, two 22s, one 18g. RIJ place 2/9      CODE - FULL    DISPO - CCU 87M w pmh CAD, hx CABG approx 10 years ago, DM2, hernia, dementia (AAOx2 at baseline) presents for NSTEMI, cardiogenic shock, GIB admitted to CCU for further management.     PLAN:     CARDIOVASCULAR  #Cardiogenic shock vs less likely hypovolemic shock from GIB  With known CAD, Hx of CABG. On arrival with BP 79/61, elevated lactate and LFTs, cool lower extremities, cool lower ext, altered mental status compared to baseline with CXR showing pulmonary congestion. ECHO showing EF 25%. CVP of 9 indicating less likely hypovolemia. Feet are cool on exam B/L. Lactate on 2/13 PM was 1.5. S/p dobutamine drip (stopped on AM 2/13)  - MAPs remaining in the 60s  - UOP picking up s/p 20mg IVP lasix  - F/u noon lactate       #NSTEMI vs Type II MI in the setting of GIB  EKG w anterolateral lead ST depressions w avR elevations with CAD and hx of CABG in 2002 with 2/3 grafts now occluded as of 2009. Given avR elevation possible LM disease. With concern for GIB, however rectal exam negative for blood. Trop T at [facility] 11.2. At [facility] Trop T .94, CKMB 33.2. Hypovolemia 2/2 from the GIB is causing a demand ischemia leading to elevated cardiac enzymes. Two melanotic stool o/n (2/11-2/12). Trops and CKM have been trended to peak. Hgb now stable at 9.1.   - C/w holding Heparin, ASa, and plavix in the setting of GIB (may be resolved now that Hgb is stable)  - c/w lipitor 40mg  - F/u CBC at 5pm   - Once patient is stabilized will consult with interventional about possible cath, low likelihood of cath occurring due to risk of GIB reoccurring    #HFrEF  ECHO with EF of 25%  - As patient's HD picture becomes more stable GDMT will be initiated  - 20mg lasix IVP with good urine output response     - Obtain repeat ECHO once HD stable and at ideal volume status       PULMONARY  #Pulmonary Edema  With B/L crackles, tachypnea, belly breathing on arrival. CXR w B/L pulmonary congestion. Bedside Echo with reduced EF per cards fellow. Not on diuretic at home. Bibasilar crackles on exam.   - CXR showing some mild worsening of congestion   - Lasix 20mg IVP given with appropriate urine output    - monitor urine output       GI  #GIB  With witnessed episode of melena, stool guiac positive at [facility] and received 1 u prbc, IV protonix 80 x 1 there. Pre transfusion hgb 10.4. With no additional episodes of melena, negative rectal exam. However with repeat hgb (post transfusion) at [facility] 9.8, tachycardia, hypotension, altered mental status. S/p 2 u pRBC. Patient had melanotic stool on 2/11 and 2 o/n into the 12th. EGD and CT A/P both found mass in the 2nd duodenum. EGD did not find source of bleeding. c-scope found no sources of bleeding with some diverticula   - Hgb 9.1 this am   - Protonix 40mg IVP BID  - NPO due to failure to pass dysphagia screen   - 3 PIVs, two 22g, one 18g    ID  #Fever w/ leukocytosis possibly 2/2 to CAP   Pt experienced a 101.6 rectal temp on 2/11 along with the sustained leukocytosis. Leukocytosis may be in the setting of Type II MI and GIB. Leukocytosis is downtrending, fever curve is downtrending 100.7 on 2/12, then 100.2 on 2/13  - Possible RML infiltrate that has been relatively stable since the 2/11  - Due to consistent fevers that are on a downward trend we will starting CFTX 1g x5 days and Azithro 500mg x 3 days  for CAP coverage, despite QTc being elongated on the EKG the patient has a RBBB and was deemed ok to give azithro  - F/u MRSA swab  - F/u Urine legionella   - Pro-brynn .1   - Bcx drawn on 2/11, NGTD         NEURO  W baseline mental status AAOx2. Currently AAOx1 likely in the setting of cardiogenic shock   - c/w home meds donepezil, memantine     RENAL  At [facility] Cr 1.12, with Cr .99 on arrival  - monitor BMP     ENDO  #DM2   A1c 6.7 this admission. On metformin 500 bid at home.  - c/w MISS      F - None  E - replete prn  N - NPO    DVT PPx - heparin ggt (given NSTEMI)    LINES - 3 PIVs, two 22s, one 18g. RIJ place 2/9      CODE - FULL    DISPO - CCU

## 2020-02-14 NOTE — PROGRESS NOTE ADULT - SUBJECTIVE AND OBJECTIVE BOX
REDDY DANIELS  87y  Male    Patient is a 87y old  Male who presents with a chief complaint of Cardiogenic shock, GIB, NSTEMI (13 Feb 2020 15:47)      INTERVAL HPI/OVERNIGHT EVENTS: Pt had a temp of 100.2, Tylenol was given and Bcx were drawn. The patient's 10pm lactate was 1.5 and Hgb remained stable. Fluids were stopped due to B/L crackles Patient stated his breathing was bad, but denied any cp.       T(C): 37 (02-14-20 @ 09:25), Max: 37.9 (02-13-20 @ 17:00)  HR: 96 (02-14-20 @ 11:00) (89 - 101)  BP: --  RR: 30 (02-14-20 @ 11:00) (22 - 35)  SpO2: 92% (02-14-20 @ 11:00) (90% - 99%)  Wt(kg): --Vital Signs Last 24 Hrs  T(C): 37 (14 Feb 2020 09:25), Max: 37.9 (13 Feb 2020 17:00)  T(F): 98.6 (14 Feb 2020 09:25), Max: 100.2 (13 Feb 2020 17:00)  HR: 96 (14 Feb 2020 11:00) (89 - 101)  BP: --  BP(mean): --  RR: 30 (14 Feb 2020 11:00) (22 - 35)  SpO2: 92% (14 Feb 2020 11:00) (90% - 99%)    PHYSICAL EXAM:  GENERAL: NAD, coughing with production of sputum but pt swallowed the sputum so no quality was ascertained   HEAD: Atraumatic, Normocephalic  ENMT: Dry mucous membranes  NECK: Supple, No JVD  NERVOUS SYSTEM: Alert & Oriented X1 at baseline  CHEST/LUNG: Bibasilar crackles   HEART: Regular rate and rhythm; No murmurs, rubs, or gallops  ABDOMEN: Soft, Nontender, Nondistended; Bowel sounds present  EXTREMITIES:  No edema, cool feet B/L   Vascular: 2+ UE pulses B/L, non palpable LE pulses B/L       Consultant(s) Notes Reviewed:  [x ] YES  [ ] NO  Care Discussed with Consultants/Other Providers [ x] YES  [ ] NO    LABS:                        9.1    12.15 )-----------( 145      ( 14 Feb 2020 05:20 )             28.4     02-14    141  |  108  |  21  ----------------------------<  134<H>  4.4   |  22  |  1.07    Ca    7.7<L>      14 Feb 2020 05:20  Phos  2.5     02-14  Mg     2.4     02-14    TPro  4.8<L>  /  Alb  2.2<L>  /  TBili  0.6  /  DBili  x   /  AST  30  /  ALT  21  /  AlkPhos  47  02-13          CAPILLARY BLOOD GLUCOSE      POCT Blood Glucose.: 136 mg/dL (14 Feb 2020 06:15)  POCT Blood Glucose.: 173 mg/dL (13 Feb 2020 23:28)  POCT Blood Glucose.: 173 mg/dL (13 Feb 2020 16:50)  POCT Blood Glucose.: 159 mg/dL (13 Feb 2020 12:35)      ABG - ( 13 Feb 2020 10:24 )  pH, Arterial: 7.47  pH, Blood: x     /  pCO2: 31    /  pO2: 176   / HCO3: 22    / Base Excess: -1.0  /  SaO2: 99                    RADIOLOGY & ADDITIONAL TESTS:    Imaging Personally Reviewed:  [ ] YES  [ ] NO    HEALTH ISSUES - PROBLEM Dx:

## 2020-02-15 LAB
ANION GAP SERPL CALC-SCNC: 13 MMOL/L — SIGNIFICANT CHANGE UP (ref 5–17)
APPEARANCE UR: CLEAR — SIGNIFICANT CHANGE UP
APTT BLD: 28.2 SEC — SIGNIFICANT CHANGE UP (ref 27.5–36.3)
BASE EXCESS BLDA CALC-SCNC: 0.2 MMOL/L — SIGNIFICANT CHANGE UP (ref -2–3)
BASOPHILS # BLD AUTO: 0.02 K/UL — SIGNIFICANT CHANGE UP (ref 0–0.2)
BASOPHILS # BLD AUTO: 0.04 K/UL — SIGNIFICANT CHANGE UP (ref 0–0.2)
BASOPHILS NFR BLD AUTO: 0.2 % — SIGNIFICANT CHANGE UP (ref 0–2)
BASOPHILS NFR BLD AUTO: 0.4 % — SIGNIFICANT CHANGE UP (ref 0–2)
BILIRUB UR-MCNC: NEGATIVE — SIGNIFICANT CHANGE UP
BUN SERPL-MCNC: 25 MG/DL — HIGH (ref 7–23)
CALCIUM SERPL-MCNC: 7.9 MG/DL — LOW (ref 8.4–10.5)
CHLORIDE SERPL-SCNC: 110 MMOL/L — HIGH (ref 96–108)
CO2 SERPL-SCNC: 21 MMOL/L — LOW (ref 22–31)
COLOR SPEC: YELLOW — SIGNIFICANT CHANGE UP
CREAT SERPL-MCNC: 1.06 MG/DL — SIGNIFICANT CHANGE UP (ref 0.5–1.3)
DIFF PNL FLD: ABNORMAL
EOSINOPHIL # BLD AUTO: 0.1 K/UL — SIGNIFICANT CHANGE UP (ref 0–0.5)
EOSINOPHIL # BLD AUTO: 0.13 K/UL — SIGNIFICANT CHANGE UP (ref 0–0.5)
EOSINOPHIL NFR BLD AUTO: 1.1 % — SIGNIFICANT CHANGE UP (ref 0–6)
EOSINOPHIL NFR BLD AUTO: 1.3 % — SIGNIFICANT CHANGE UP (ref 0–6)
GLUCOSE SERPL-MCNC: 138 MG/DL — HIGH (ref 70–99)
GLUCOSE UR QL: NEGATIVE — SIGNIFICANT CHANGE UP
HCO3 BLDA-SCNC: 22 MMOL/L — SIGNIFICANT CHANGE UP (ref 21–28)
HCT VFR BLD CALC: 27.2 % — LOW (ref 39–50)
HCT VFR BLD CALC: 28.2 % — LOW (ref 39–50)
HCT VFR BLD CALC: 30.9 % — LOW (ref 39–50)
HGB BLD-MCNC: 8.7 G/DL — LOW (ref 13–17)
HGB BLD-MCNC: 9.1 G/DL — LOW (ref 13–17)
HGB BLD-MCNC: 9.7 G/DL — LOW (ref 13–17)
IMM GRANULOCYTES NFR BLD AUTO: 0.4 % — SIGNIFICANT CHANGE UP (ref 0–1.5)
IMM GRANULOCYTES NFR BLD AUTO: 0.5 % — SIGNIFICANT CHANGE UP (ref 0–1.5)
INR BLD: 1.61 — HIGH (ref 0.88–1.16)
KETONES UR-MCNC: NEGATIVE — SIGNIFICANT CHANGE UP
LACTATE SERPL-SCNC: 1.1 MMOL/L — SIGNIFICANT CHANGE UP (ref 0.5–2)
LACTATE SERPL-SCNC: 1.3 MMOL/L — SIGNIFICANT CHANGE UP (ref 0.5–2)
LACTATE SERPL-SCNC: 1.8 MMOL/L — SIGNIFICANT CHANGE UP (ref 0.5–2)
LEUKOCYTE ESTERASE UR-ACNC: NEGATIVE — SIGNIFICANT CHANGE UP
LYMPHOCYTES # BLD AUTO: 1.04 K/UL — SIGNIFICANT CHANGE UP (ref 1–3.3)
LYMPHOCYTES # BLD AUTO: 1.35 K/UL — SIGNIFICANT CHANGE UP (ref 1–3.3)
LYMPHOCYTES # BLD AUTO: 11.1 % — LOW (ref 13–44)
LYMPHOCYTES # BLD AUTO: 13.1 % — SIGNIFICANT CHANGE UP (ref 13–44)
MAGNESIUM SERPL-MCNC: 2.5 MG/DL — SIGNIFICANT CHANGE UP (ref 1.6–2.6)
MCHC RBC-ENTMCNC: 29.8 PG — SIGNIFICANT CHANGE UP (ref 27–34)
MCHC RBC-ENTMCNC: 30 PG — SIGNIFICANT CHANGE UP (ref 27–34)
MCHC RBC-ENTMCNC: 31.4 GM/DL — LOW (ref 32–36)
MCHC RBC-ENTMCNC: 32 GM/DL — SIGNIFICANT CHANGE UP (ref 32–36)
MCV RBC AUTO: 93.8 FL — SIGNIFICANT CHANGE UP (ref 80–100)
MCV RBC AUTO: 95.1 FL — SIGNIFICANT CHANGE UP (ref 80–100)
MONOCYTES # BLD AUTO: 0.81 K/UL — SIGNIFICANT CHANGE UP (ref 0–0.9)
MONOCYTES # BLD AUTO: 0.93 K/UL — HIGH (ref 0–0.9)
MONOCYTES NFR BLD AUTO: 8.6 % — SIGNIFICANT CHANGE UP (ref 2–14)
MONOCYTES NFR BLD AUTO: 9 % — SIGNIFICANT CHANGE UP (ref 2–14)
MRSA PCR RESULT.: SIGNIFICANT CHANGE UP
NEUTROPHILS # BLD AUTO: 7.37 K/UL — SIGNIFICANT CHANGE UP (ref 1.8–7.4)
NEUTROPHILS # BLD AUTO: 7.78 K/UL — HIGH (ref 1.8–7.4)
NEUTROPHILS NFR BLD AUTO: 75.7 % — SIGNIFICANT CHANGE UP (ref 43–77)
NEUTROPHILS NFR BLD AUTO: 78.6 % — HIGH (ref 43–77)
NITRITE UR-MCNC: NEGATIVE — SIGNIFICANT CHANGE UP
NRBC # BLD: 0 /100 WBCS — SIGNIFICANT CHANGE UP (ref 0–0)
NRBC # BLD: 0 /100 WBCS — SIGNIFICANT CHANGE UP (ref 0–0)
PCO2 BLDA: 29 MMHG — LOW (ref 35–48)
PH BLDA: 7.51 — HIGH (ref 7.35–7.45)
PH UR: 6 — SIGNIFICANT CHANGE UP (ref 5–8)
PHOSPHATE SERPL-MCNC: 3.1 MG/DL — SIGNIFICANT CHANGE UP (ref 2.5–4.5)
PLATELET # BLD AUTO: 176 K/UL — SIGNIFICANT CHANGE UP (ref 150–400)
PLATELET # BLD AUTO: 203 K/UL — SIGNIFICANT CHANGE UP (ref 150–400)
PO2 BLDA: 89 MMHG — SIGNIFICANT CHANGE UP (ref 83–108)
POTASSIUM SERPL-MCNC: 4.4 MMOL/L — SIGNIFICANT CHANGE UP (ref 3.5–5.3)
POTASSIUM SERPL-SCNC: 4.4 MMOL/L — SIGNIFICANT CHANGE UP (ref 3.5–5.3)
PROCALCITONIN SERPL-MCNC: 0.1 NG/ML — SIGNIFICANT CHANGE UP (ref 0.02–0.1)
PROT UR-MCNC: ABNORMAL MG/DL
PROTHROM AB SERPL-ACNC: 18.6 SEC — HIGH (ref 10–12.9)
RAPID RVP RESULT: SIGNIFICANT CHANGE UP
RBC # BLD: 2.9 M/UL — LOW (ref 4.2–5.8)
RBC # BLD: 3.25 M/UL — LOW (ref 4.2–5.8)
RBC # FLD: 15.4 % — HIGH (ref 10.3–14.5)
RBC # FLD: 15.6 % — HIGH (ref 10.3–14.5)
S AUREUS DNA NOSE QL NAA+PROBE: DETECTED
SAO2 % BLDA: 97 % — SIGNIFICANT CHANGE UP (ref 95–100)
SODIUM SERPL-SCNC: 144 MMOL/L — SIGNIFICANT CHANGE UP (ref 135–145)
SP GR SPEC: 1.02 — SIGNIFICANT CHANGE UP (ref 1–1.03)
UROBILINOGEN FLD QL: 0.2 E.U./DL — SIGNIFICANT CHANGE UP
WBC # BLD: 10.28 K/UL — SIGNIFICANT CHANGE UP (ref 3.8–10.5)
WBC # BLD: 9.43 K/UL — SIGNIFICANT CHANGE UP (ref 3.8–10.5)
WBC # FLD AUTO: 10.28 K/UL — SIGNIFICANT CHANGE UP (ref 3.8–10.5)
WBC # FLD AUTO: 9.43 K/UL — SIGNIFICANT CHANGE UP (ref 3.8–10.5)

## 2020-02-15 PROCEDURE — 99292 CRITICAL CARE ADDL 30 MIN: CPT

## 2020-02-15 PROCEDURE — 71045 X-RAY EXAM CHEST 1 VIEW: CPT | Mod: 26

## 2020-02-15 PROCEDURE — 99291 CRITICAL CARE FIRST HOUR: CPT

## 2020-02-15 PROCEDURE — 93010 ELECTROCARDIOGRAM REPORT: CPT

## 2020-02-15 RX ORDER — ACETAMINOPHEN 500 MG
650 TABLET ORAL ONCE
Refills: 0 | Status: COMPLETED | OUTPATIENT
Start: 2020-02-15 | End: 2020-02-15

## 2020-02-15 RX ORDER — FUROSEMIDE 40 MG
20 TABLET ORAL ONCE
Refills: 0 | Status: COMPLETED | OUTPATIENT
Start: 2020-02-15 | End: 2020-02-15

## 2020-02-15 RX ADMIN — POLYETHYLENE GLYCOL 3350 17 GRAM(S): 17 POWDER, FOR SOLUTION ORAL at 05:49

## 2020-02-15 RX ADMIN — Medication 4: at 21:30

## 2020-02-15 RX ADMIN — LATANOPROST 1 DROP(S): 0.05 SOLUTION/ DROPS OPHTHALMIC; TOPICAL at 21:36

## 2020-02-15 RX ADMIN — MEMANTINE HYDROCHLORIDE 10 MILLIGRAM(S): 10 TABLET ORAL at 05:49

## 2020-02-15 RX ADMIN — DONEPEZIL HYDROCHLORIDE 5 MILLIGRAM(S): 10 TABLET, FILM COATED ORAL at 21:30

## 2020-02-15 RX ADMIN — MEMANTINE HYDROCHLORIDE 10 MILLIGRAM(S): 10 TABLET ORAL at 18:04

## 2020-02-15 RX ADMIN — Medication 3 MILLILITER(S): at 17:23

## 2020-02-15 RX ADMIN — Medication 3 MILLILITER(S): at 09:16

## 2020-02-15 RX ADMIN — Medication 650 MILLIGRAM(S): at 17:43

## 2020-02-15 RX ADMIN — Medication 3 MILLILITER(S): at 21:30

## 2020-02-15 RX ADMIN — PANTOPRAZOLE SODIUM 40 MILLIGRAM(S): 20 TABLET, DELAYED RELEASE ORAL at 05:49

## 2020-02-15 RX ADMIN — ATORVASTATIN CALCIUM 40 MILLIGRAM(S): 80 TABLET, FILM COATED ORAL at 21:30

## 2020-02-15 RX ADMIN — Medication 2: at 18:24

## 2020-02-15 RX ADMIN — PANTOPRAZOLE SODIUM 40 MILLIGRAM(S): 20 TABLET, DELAYED RELEASE ORAL at 18:04

## 2020-02-15 RX ADMIN — AZITHROMYCIN 500 MILLIGRAM(S): 500 TABLET, FILM COATED ORAL at 12:51

## 2020-02-15 RX ADMIN — CHLORHEXIDINE GLUCONATE 1 APPLICATION(S): 213 SOLUTION TOPICAL at 05:48

## 2020-02-15 RX ADMIN — Medication 650 MILLIGRAM(S): at 20:00

## 2020-02-15 RX ADMIN — Medication 3 MILLILITER(S): at 05:42

## 2020-02-15 RX ADMIN — Medication 81 MILLIGRAM(S): at 12:51

## 2020-02-15 RX ADMIN — FINASTERIDE 5 MILLIGRAM(S): 5 TABLET, FILM COATED ORAL at 12:51

## 2020-02-15 RX ADMIN — CEFTRIAXONE 100 MILLIGRAM(S): 500 INJECTION, POWDER, FOR SOLUTION INTRAMUSCULAR; INTRAVENOUS at 12:51

## 2020-02-15 RX ADMIN — Medication 20 MILLIGRAM(S): at 09:22

## 2020-02-15 RX ADMIN — POLYETHYLENE GLYCOL 3350 17 GRAM(S): 17 POWDER, FOR SOLUTION ORAL at 18:04

## 2020-02-15 NOTE — PROGRESS NOTE ADULT - SUBJECTIVE AND OBJECTIVE BOX
Interval / Overnight: Hg stable with lactate @ 1.3.     Subjective: daughter bedside tells me Mr. Maharaj is breathing heavier this AM. She also notes hearing a little raspy-ness in his breathing. Mr. Maharaj, although with limited mental status, tells me he is feeling OK and currently is not in pain.     VITAL SIGNS:  Vital Signs Last 24 Hrs  T(C): 37.2 (15 Feb 2020 10:14), Max: 37.8 (14 Feb 2020 21:47)  T(F): 99 (15 Feb 2020 10:14), Max: 100.1 (14 Feb 2020 21:47)  HR: 98 (15 Feb 2020 10:05) (85 - 100)  BP: 97/60 (15 Feb 2020 09:00) (95/57 - 110/66)  BP(mean): 73 (15 Feb 2020 09:00) (71 - 83)  RR: 26 (15 Feb 2020 10:05) (23 - 56)  SpO2: 97% (15 Feb 2020 10:05) (94% - 99%)    PHYSICAL EXAM:    Gen: Older male, non-ill appearing, doesn't appear in distress   HEENT: MMM, pupils equal and reactive   Neck: supple, no thyromegaly, no lymphadenopathy  Cardiovascular: +S1/S2, RRR, no M/G/R  Respiratory: increased rate of breathing, no accessory muscle use, posterior mild rales, no wheezes or rhonchi   Extremities: WWP; no edema   : Arnett with clear yellow urine   Neurological: Alert to name, not place or date    MEDICATIONS:  MEDICATIONS  (STANDING):  albuterol/ipratropium for Nebulization. 3 milliLiter(s) Nebulizer every 6 hours  aspirin  chewable 81 milliGRAM(s) Oral daily  atorvastatin 40 milliGRAM(s) Oral at bedtime  azithromycin   Tablet 500 milliGRAM(s) Oral daily  cefTRIAXone   IVPB 1000 milliGRAM(s) IV Intermittent every 24 hours  chlorhexidine 2% Cloths 1 Application(s) Topical <User Schedule>  dextrose 5%. 1000 milliLiter(s) (50 mL/Hr) IV Continuous <Continuous>  dextrose 50% Injectable 12.5 Gram(s) IV Push once  dextrose 50% Injectable 25 Gram(s) IV Push once  dextrose 50% Injectable 25 Gram(s) IV Push once  donepezil 5 milliGRAM(s) Oral at bedtime  finasteride 5 milliGRAM(s) Oral daily  insulin lispro (HumaLOG) corrective regimen sliding scale   SubCutaneous Before meals and at bedtime  latanoprost 0.005% Ophthalmic Solution 1 Drop(s) Right EYE at bedtime  memantine 10 milliGRAM(s) Oral two times a day  pantoprazole  Injectable 40 milliGRAM(s) IV Push every 12 hours  polyethylene glycol 3350 17 Gram(s) Oral two times a day    MEDICATIONS  (PRN):  dextrose 40% Gel 15 Gram(s) Oral once PRN Blood Glucose LESS THAN 70 milliGRAM(s)/deciliter  glucagon  Injectable 1 milliGRAM(s) IntraMuscular once PRN Glucose LESS THAN 70 milligrams/deciliter      ALLERGIES:  Allergies    No Known Allergies    Intolerances        LABS:                        8.7    10.28 )-----------( 176      ( 15 Feb 2020 03:25 )             27.2     02-15    144  |  110<H>  |  25<H>  ----------------------------<  138<H>  4.4   |  21<L>  |  1.06    Ca    7.9<L>      15 Feb 2020 03:25  Phos  3.1     02-15  Mg     2.5     02-15          CAPILLARY BLOOD GLUCOSE      POCT Blood Glucose.: 129 mg/dL (15 Feb 2020 06:27)      RADIOLOGY & ADDITIONAL TESTS: Reviewed.

## 2020-02-15 NOTE — PROGRESS NOTE ADULT - ASSESSMENT
87M w pmh CAD, hx CABG approx 10 years ago, DM2, hernia, dementia (AAOx2 at baseline) presents for NSTEMI, cardiogenic shock, GIB admitted to CCU for further management.     PLAN:     CARDIOVASCULAR  #Cardiogenic shock vs less likely hypovolemic shock from GIB  With known CAD, Hx of CABG. On arrival with BP 79/61, elevated lactate and LFTs, cool lower extremities, cool lower ext, altered mental status compared to baseline with CXR showing pulmonary congestion. ECHO showing EF 25%. CVP of 9 indicating less likely hypovolemia. Feet are cool on exam B/L. Lactate on 2/13 PM was 1.5. S/p dobutamine drip (stopped on AM 2/13)  - MAPs remaining in the 60s  - Lactate remains WNL  - F/u lactate @ 1800         #NSTEMI vs Type II MI in the setting of GIB  EKG w anterolateral lead ST depressions w avR elevations with CAD and hx of CABG in 2002 with 2/3 grafts now occluded as of 2009. Given avR elevation possible LM disease. With concern for GIB, however rectal exam negative for blood. [facility] At [facility] Trop T .94, CKMB 33.2. Hypovolemia 2/2 from the GIB is causing a demand ischemia leading to elevated cardiac enzymes. Two melanotic stool o/n (2/11-2/12). Trops and CKM have been trended to peak. Hgb now stable at 9.1.   - Resumed aspirin yesterday   - continue to monitor for signs of bleeding  - Hg slightly down since last night -- now @ 8.7. Will repeat CBC @ 18.   - ST depressions with interval improvement on today's EKG.   - c/w lipitor 40mg    #HFrEF  ECHO with EF of 25%  - Chest X ray with increased congestion and CVP @ 11.   - repeat ABG just showing resp. alkalosis with good O2 sat.   - 20mg lasix IVP this AM    - Obtain repeat ECHO once HD stable and at ideal volume status   - As patient's HD picture becomes more stable GDMT will be initiated      PULMONARY  #Pulmonary Edema  With B/L crackles, tachypnea, belly breathing on arrival. CXR w B/L pulmonary congestion. Bedside Echo with reduced EF per cards fellow. Not on diuretic at home. Bibasilar crackles on exam.   - CXR showing some mild worsening of congestion   - CVP @ 11   - Lasix 20mg IVP this AM  - monitor urine output       GI  #GIB  With witnessed episode of melena, stool guiac positive at [facility] and received 1 u prbc, IV protonix 80 x 1 there. Pre transfusion hgb 10.4. With no additional episodes of melena, negative rectal exam. However with repeat hgb (post transfusion) at [facility] 9.8, tachycardia, hypotension, altered mental status. S/p 2 u pRBC. Patient had melanotic stool on 2/11 and 2 o/n into the 12th. EGD and CT A/P both found mass in the 2nd duodenum. EGD did not find source of bleeding. c-scope found no sources of bleeding with some diverticula   - Hgb 8.7 this am   - Protonix 40mg IVP BID  - 3 PIVs, two 22g, one 18g    ID  #Fever w/ leukocytosis possibly 2/2 to CAP   Pt experienced a 101.6 rectal temp on 2/11 along with the sustained leukocytosis. Leukocytosis may be in the setting of Type II MI and GIB. Leukocytosis is downtrending, fever curve is downtrending 100.7 on 2/12, then 100.2 on 2/13  - Possible RML infiltrate that has been relatively stable since the 2/11  - Due to consistent fevers that are on a downward trend we will starting CFTX 1g x5 days and Azithro 500mg x 3 days  for CAP coverage, despite QTc being elongated on the EKG the patient has a RBBB and was deemed ok to give azithro  - procal @ 0.1  - Urine legionella neg  - MRSA swap pending  - c/w ceft + azithro        NEURO  W baseline mental status AAOx2. Currently AAOx1 likely in the setting of cardiogenic shock   - c/w home meds donepezil, memantine     RENAL  At [facility] Cr 1.12, with Cr .99 on arrival  - monitor BMP     ENDO  #DM2   A1c 6.7 this admission. On metformin 500 bid at home.  - c/w MISS      F - None  E - replete prn  N - pureed     DVT PPx - SCDs.     LINES - 3 PIVs, two 22s, one 18g. RIJ place 2/9      CODE - FULL    DISPO - CCU

## 2020-02-15 NOTE — GOALS OF CARE CONVERSATION - ADVANCED CARE PLANNING - CONVERSATION DETAILS
Pertinent details regarding patient's clinical status and plan were discussed with Wife and Granddaughter at bedside regarding GOC in the event of cardiopulmonary arrest. Wife described that he has not had a conversation with her regarding what his wishes would be in the event he could not make medical decisions for himself. The wife and granddaughter both agreed that if the patient's respiratory status deteriorated to the point of requiring intubation that all measures should be taken and that all interventions should be pursued. Family further discussed interest in CPR other life-sustaining therapies in the event of cardiac arrest. They would like the patient to be FULL CODE for now.

## 2020-02-16 LAB
ANION GAP SERPL CALC-SCNC: 9 MMOL/L — SIGNIFICANT CHANGE UP (ref 5–17)
APTT BLD: 27.2 SEC — LOW (ref 27.5–36.3)
BLD GP AB SCN SERPL QL: NEGATIVE — SIGNIFICANT CHANGE UP
BUN SERPL-MCNC: 26 MG/DL — HIGH (ref 7–23)
CALCIUM SERPL-MCNC: 7.8 MG/DL — LOW (ref 8.4–10.5)
CHLORIDE SERPL-SCNC: 113 MMOL/L — HIGH (ref 96–108)
CO2 SERPL-SCNC: 22 MMOL/L — SIGNIFICANT CHANGE UP (ref 22–31)
CREAT SERPL-MCNC: 1.08 MG/DL — SIGNIFICANT CHANGE UP (ref 0.5–1.3)
CULTURE RESULTS: SIGNIFICANT CHANGE UP
CULTURE RESULTS: SIGNIFICANT CHANGE UP
GLUCOSE SERPL-MCNC: 143 MG/DL — HIGH (ref 70–99)
HCT VFR BLD CALC: 27.5 % — LOW (ref 39–50)
HGB BLD-MCNC: 8.7 G/DL — LOW (ref 13–17)
INR BLD: 1.6 — HIGH (ref 0.88–1.16)
LACTATE SERPL-SCNC: 1 MMOL/L — SIGNIFICANT CHANGE UP (ref 0.5–2)
MAGNESIUM SERPL-MCNC: 2.3 MG/DL — SIGNIFICANT CHANGE UP (ref 1.6–2.6)
MCHC RBC-ENTMCNC: 30 PG — SIGNIFICANT CHANGE UP (ref 27–34)
MCHC RBC-ENTMCNC: 31.6 GM/DL — LOW (ref 32–36)
MCV RBC AUTO: 94.8 FL — SIGNIFICANT CHANGE UP (ref 80–100)
NRBC # BLD: 0 /100 WBCS — SIGNIFICANT CHANGE UP (ref 0–0)
PHOSPHATE SERPL-MCNC: 3.3 MG/DL — SIGNIFICANT CHANGE UP (ref 2.5–4.5)
PLATELET # BLD AUTO: 189 K/UL — SIGNIFICANT CHANGE UP (ref 150–400)
POTASSIUM SERPL-MCNC: 4 MMOL/L — SIGNIFICANT CHANGE UP (ref 3.5–5.3)
POTASSIUM SERPL-SCNC: 4 MMOL/L — SIGNIFICANT CHANGE UP (ref 3.5–5.3)
PROTHROM AB SERPL-ACNC: 18.5 SEC — HIGH (ref 10–12.9)
RBC # BLD: 2.9 M/UL — LOW (ref 4.2–5.8)
RBC # FLD: 15.3 % — HIGH (ref 10.3–14.5)
RH IG SCN BLD-IMP: POSITIVE — SIGNIFICANT CHANGE UP
SODIUM SERPL-SCNC: 144 MMOL/L — SIGNIFICANT CHANGE UP (ref 135–145)
SPECIMEN SOURCE: SIGNIFICANT CHANGE UP
SPECIMEN SOURCE: SIGNIFICANT CHANGE UP
WBC # BLD: 9.8 K/UL — SIGNIFICANT CHANGE UP (ref 3.8–10.5)
WBC # FLD AUTO: 9.8 K/UL — SIGNIFICANT CHANGE UP (ref 3.8–10.5)

## 2020-02-16 PROCEDURE — 71045 X-RAY EXAM CHEST 1 VIEW: CPT | Mod: 26

## 2020-02-16 PROCEDURE — 93010 ELECTROCARDIOGRAM REPORT: CPT

## 2020-02-16 PROCEDURE — 99291 CRITICAL CARE FIRST HOUR: CPT

## 2020-02-16 RX ORDER — ACETAMINOPHEN 500 MG
325 TABLET ORAL ONCE
Refills: 0 | Status: COMPLETED | OUTPATIENT
Start: 2020-02-16 | End: 2020-02-16

## 2020-02-16 RX ORDER — FUROSEMIDE 40 MG
20 TABLET ORAL ONCE
Refills: 0 | Status: COMPLETED | OUTPATIENT
Start: 2020-02-16 | End: 2020-02-16

## 2020-02-16 RX ORDER — ACETAMINOPHEN 500 MG
650 TABLET ORAL ONCE
Refills: 0 | Status: COMPLETED | OUTPATIENT
Start: 2020-02-16 | End: 2020-02-16

## 2020-02-16 RX ADMIN — PANTOPRAZOLE SODIUM 40 MILLIGRAM(S): 20 TABLET, DELAYED RELEASE ORAL at 17:10

## 2020-02-16 RX ADMIN — Medication 3 MILLILITER(S): at 05:16

## 2020-02-16 RX ADMIN — Medication 20 MILLIGRAM(S): at 10:27

## 2020-02-16 RX ADMIN — Medication 325 MILLIGRAM(S): at 05:49

## 2020-02-16 RX ADMIN — ATORVASTATIN CALCIUM 40 MILLIGRAM(S): 80 TABLET, FILM COATED ORAL at 21:59

## 2020-02-16 RX ADMIN — FINASTERIDE 5 MILLIGRAM(S): 5 TABLET, FILM COATED ORAL at 11:40

## 2020-02-16 RX ADMIN — MEMANTINE HYDROCHLORIDE 10 MILLIGRAM(S): 10 TABLET ORAL at 05:17

## 2020-02-16 RX ADMIN — Medication 81 MILLIGRAM(S): at 11:41

## 2020-02-16 RX ADMIN — MEMANTINE HYDROCHLORIDE 10 MILLIGRAM(S): 10 TABLET ORAL at 17:11

## 2020-02-16 RX ADMIN — Medication 650 MILLIGRAM(S): at 22:39

## 2020-02-16 RX ADMIN — PANTOPRAZOLE SODIUM 40 MILLIGRAM(S): 20 TABLET, DELAYED RELEASE ORAL at 05:17

## 2020-02-16 RX ADMIN — CHLORHEXIDINE GLUCONATE 1 APPLICATION(S): 213 SOLUTION TOPICAL at 05:17

## 2020-02-16 RX ADMIN — CEFTRIAXONE 100 MILLIGRAM(S): 500 INJECTION, POWDER, FOR SOLUTION INTRAMUSCULAR; INTRAVENOUS at 11:41

## 2020-02-16 RX ADMIN — Medication 2: at 11:56

## 2020-02-16 RX ADMIN — Medication 3 MILLILITER(S): at 17:00

## 2020-02-16 RX ADMIN — Medication 650 MILLIGRAM(S): at 23:30

## 2020-02-16 RX ADMIN — DONEPEZIL HYDROCHLORIDE 5 MILLIGRAM(S): 10 TABLET, FILM COATED ORAL at 21:59

## 2020-02-16 RX ADMIN — POLYETHYLENE GLYCOL 3350 17 GRAM(S): 17 POWDER, FOR SOLUTION ORAL at 17:10

## 2020-02-16 RX ADMIN — POLYETHYLENE GLYCOL 3350 17 GRAM(S): 17 POWDER, FOR SOLUTION ORAL at 05:17

## 2020-02-16 RX ADMIN — LATANOPROST 1 DROP(S): 0.05 SOLUTION/ DROPS OPHTHALMIC; TOPICAL at 21:59

## 2020-02-16 RX ADMIN — Medication 3 MILLILITER(S): at 09:51

## 2020-02-16 RX ADMIN — AZITHROMYCIN 500 MILLIGRAM(S): 500 TABLET, FILM COATED ORAL at 11:40

## 2020-02-16 NOTE — PROGRESS NOTE ADULT - SUBJECTIVE AND OBJECTIVE BOX
REDDY DANIELS  87y  Male    Patient is a 87y old  Male who presents with a chief complaint of Cardiogenic shock, GIB, NSTEMI (15 Feb 2020 11:02)      INTERVAL HPI/OVERNIGHT EVENTS: O/n his lewis was discontinued and he passed TOV. Patient was placedo n HFNC yesterday and into the overnight shift. This morning the patient denies any CP, shortness of breath, N/V, abdominal pain.       T(C): 36.9 (20 @ 09:54), Max: 38.3 (02-15-20 @ 17:10)  HR: 87 (20 @ 11:40) (81 - 98)  BP: 156/68 (20 @ 11:00) (96/54 - 156/68)  RR: 28 (20 @ 11:40) (19 - 36)  SpO2: 96% (20 @ 11:40) (91% - 100%)  Wt(kg): --Vital Signs Last 24 Hrs  T(C): 36.9 (2020 09:54), Max: 38.3 (15 Feb 2020 17:10)  T(F): 98.5 (2020 09:54), Max: 101 (15 Feb 2020 17:10)  HR: 87 (2020 11:40) (81 - 98)  BP: 156/68 (2020 11:00) (96/54 - 156/68)  BP(mean): 98 (2020 11:00) (70 - 98)  RR: 28 (2020 11:40) (19 - 36)  SpO2: 96% (2020 11:40) (91% - 100%)    PHYSICAL EXAM:  GENERAL: NAD  HEAD: Atraumatic, Normocephalic  EYES: EOMI, PERRLA, conjunctiva and sclera clear  ENMT: Dry mucous membranes  NECK: Supple, No JVD  NERVOUS SYSTEM:  Alert & Oriented X3  CHEST/LUNG: Bibasilar crackles R>L  HEART: Regular rate and rhythm; No murmurs, rubs, or gallops. CVP 11   ABDOMEN: Soft, Nontender, Nondistended; Bowel sounds present  EXTREMITIES:  WWP, No edema, cool feet B/L   Vascular: 2+UE pulses, unable to palpate LE pulses B/L      Consultant(s) Notes Reviewed:  [x ] YES  [ ] NO  Care Discussed with Consultants/Other Providers [ x] YES  [ ] NO    LABS:                        8.7    9.80  )-----------( 189      ( 2020 05:23 )             27.5     02-16    144  |  113<H>  |  26<H>  ----------------------------<  143<H>  4.0   |  22  |  1.08    Ca    7.8<L>      2020 05:23  Phos  3.3     02-16  Mg     2.3     02-16        PT/INR - ( 2020 05:23 )   PT: 18.5 sec;   INR: 1.60          PTT - ( 2020 05:23 )  PTT:27.2 sec  Urinalysis Basic - ( 15 Feb 2020 18:06 )    Color: Yellow / Appearance: Clear / S.025 / pH: x  Gluc: x / Ketone: NEGATIVE  / Bili: Negative / Urobili: 0.2 E.U./dL   Blood: x / Protein: Trace mg/dL / Nitrite: NEGATIVE   Leuk Esterase: NEGATIVE / RBC: Many /HPF / WBC < 5 /HPF   Sq Epi: x / Non Sq Epi: 0-5 /HPF / Bacteria: Present /HPF      CAPILLARY BLOOD GLUCOSE      POCT Blood Glucose.: 189 mg/dL (2020 11:45)  POCT Blood Glucose.: 136 mg/dL (2020 06:05)  POCT Blood Glucose.: 204 mg/dL (15 Feb 2020 21:20)  POCT Blood Glucose.: 160 mg/dL (15 Feb 2020 18:10)  POCT Blood Glucose.: 134 mg/dL (15 Feb 2020 12:50)      ABG - ( 15 Feb 2020 09:16 )  pH, Arterial: 7.51  pH, Blood: x     /  pCO2: 29    /  pO2: 89    / HCO3: 22    / Base Excess: 0.2   /  SaO2: 97                Urinalysis Basic - ( 15 Feb 2020 18:06 )    Color: Yellow / Appearance: Clear / S.025 / pH: x  Gluc: x / Ketone: NEGATIVE  / Bili: Negative / Urobili: 0.2 E.U./dL   Blood: x / Protein: Trace mg/dL / Nitrite: NEGATIVE   Leuk Esterase: NEGATIVE / RBC: Many /HPF / WBC < 5 /HPF   Sq Epi: x / Non Sq Epi: 0-5 /HPF / Bacteria: Present /HPF        RADIOLOGY & ADDITIONAL TESTS:    Imaging Personally Reviewed:  [ ] YES  [ ] NO    HEALTH ISSUES - PROBLEM Dx:

## 2020-02-16 NOTE — PROGRESS NOTE ADULT - ASSESSMENT
87M w pmh CAD, hx CABG approx 10 years ago, DM2, hernia, dementia (AAOx2 at baseline) presents for NSTEMI, cardiogenic shock, GIB admitted to CCU for further management.     PLAN:     CARDIOVASCULAR  #Cardiogenic shock vs less likely hypovolemic shock from GIB  With known CAD, Hx of CABG. On arrival with BP 79/61, elevated lactate and LFTs, cool lower extremities, cool lower ext, altered mental status compared to baseline with CXR showing pulmonary congestion. ECHO showing EF 25%. CVP of 1 indicating less likely hypovolemia. Feet are cool on exam B/L.   - MAPs remaining in the 60s to 70s  - Lactate remains WNL       #NSTEMI vs Type II MI in the setting of GIB  EKG w anterolateral lead ST depressions w avR elevations with CAD and hx of CABG in 2002 with 2/3 grafts now occluded as of 2009. Given avR elevation possible LM disease. With concern for GIB, however rectal exam negative for blood. Trop T at [facility] 11.2. At [facility] Trop T .94, CKMB 33.2. Hypovolemia 2/2 from the GIB is causing a demand ischemia leading to elevated cardiac enzymes. Two melanotic stool o/n (2/11-2/12). Trops and CKM have been trended to peak. Hgb now stable at 8.7  - C/w ASA   - continue to monitor for signs of bleeding  - ST depressions remain with no worsening   - c/w lipitor 40mg    #HFrEF  ECHO with EF of 25%  - Chest X ray with no change in congestion and CVP @ 11.   - 20mg lasix IVP this AM    - Obtain repeat ECHO once HD stable and at ideal volume status   - As patient's HD picture becomes more stable GDMT will be initiated      PULMONARY  #Pulmonary Edema  With B/L crackles, tachypnea, belly breathing on arrival. CXR w B/L pulmonary congestion. Bedside Echo with reduced EF per cards fellow. Not on diuretic at home. Bibasilar crackles on exam.   - CXR showing similar degree of congestion   - CVP @ 11   - Lasix 20mg IVP this AM  - monitor urine output   - Weened of HFNC (40/40) to NC 3L from overnight       GI  #GIB  With witnessed episode of melena, stool guiac positive at [facility] and received 1 u prbc, IV protonix 80 x 1 there. Pre transfusion hgb 10.4. With no additional episodes of melena, negative rectal exam. However with repeat hgb (post transfusion) at [facility] 9.8, tachycardia, hypotension, altered mental status. S/p 2 u pRBC. Patient had melanotic stool on 2/11 and 2 o/n into the 12th. EGD and CT A/P both found mass in the 2nd duodenum. EGD did not find source of bleeding. c-scope found no sources of bleeding with some diverticula   - Hgb 8.7 this am   - Protonix 40mg IVP BID  - 3 PIVs, two 22g, one 18g    ID  #Fever w/ leukocytosis possibly 2/2 to CAP   Pt experienced a 101.6 rectal temp on 2/11 along with the sustained leukocytosis. Leukocytosis may be in the setting of Type II MI and GIB. Leukocytosis is downtrending, fever curve is downtrending 100.7 on 2/12, then 100.2 on 2/13  - Possible RML infiltrate that has been relatively stable since the 2/11  - Due to consistent fevers that are on a downward trend we will starting CFTX 1g x5 days and Azithro 500mg x 3 days  for CAP coverage, despite QTc being elongated on the EKG the patient has a RBBB and was deemed ok to give azithro  - procal @ 0.1  - Urine legionella neg  - MRSA swap pending  - c/w ceft + azithro        NEURO  W baseline mental status AAOx2. Currently AAOx1 likely in the setting of cardiogenic shock   - c/w home meds donepezil, memantine     RENAL  At [facility] Cr 1.12, with Cr .99 on arrival  - monitor BMP     ENDO  #DM2   A1c 6.7 this admission. On metformin 500 bid at home.  - c/w MISS      F - None  E - replete prn  N - pureed     DVT PPx - SCDs.     LINES - 3 PIVs, two 22s, one 18g. RIJ place 2/9      CODE - FULL    DISPO - CCU

## 2020-02-17 LAB
ANION GAP SERPL CALC-SCNC: 13 MMOL/L — SIGNIFICANT CHANGE UP (ref 5–17)
BASOPHILS # BLD AUTO: 0.05 K/UL — SIGNIFICANT CHANGE UP (ref 0–0.2)
BASOPHILS NFR BLD AUTO: 0.4 % — SIGNIFICANT CHANGE UP (ref 0–2)
BUN SERPL-MCNC: 27 MG/DL — HIGH (ref 7–23)
CALCIUM SERPL-MCNC: 8.2 MG/DL — LOW (ref 8.4–10.5)
CHLORIDE SERPL-SCNC: 112 MMOL/L — HIGH (ref 96–108)
CO2 SERPL-SCNC: 20 MMOL/L — LOW (ref 22–31)
CREAT SERPL-MCNC: 0.96 MG/DL — SIGNIFICANT CHANGE UP (ref 0.5–1.3)
EOSINOPHIL # BLD AUTO: 0.2 K/UL — SIGNIFICANT CHANGE UP (ref 0–0.5)
EOSINOPHIL NFR BLD AUTO: 1.6 % — SIGNIFICANT CHANGE UP (ref 0–6)
GLUCOSE BLDC GLUCOMTR-MCNC: 125 MG/DL — HIGH (ref 70–99)
GLUCOSE BLDC GLUCOMTR-MCNC: 147 MG/DL — HIGH (ref 70–99)
GLUCOSE BLDC GLUCOMTR-MCNC: 188 MG/DL — HIGH (ref 70–99)
GLUCOSE SERPL-MCNC: 151 MG/DL — HIGH (ref 70–99)
HCT VFR BLD CALC: 30.7 % — LOW (ref 39–50)
HGB BLD-MCNC: 9.5 G/DL — LOW (ref 13–17)
IMM GRANULOCYTES NFR BLD AUTO: 0.7 % — SIGNIFICANT CHANGE UP (ref 0–1.5)
LYMPHOCYTES # BLD AUTO: 1.29 K/UL — SIGNIFICANT CHANGE UP (ref 1–3.3)
LYMPHOCYTES # BLD AUTO: 10.6 % — LOW (ref 13–44)
MAGNESIUM SERPL-MCNC: 2.3 MG/DL — SIGNIFICANT CHANGE UP (ref 1.6–2.6)
MCHC RBC-ENTMCNC: 29.7 PG — SIGNIFICANT CHANGE UP (ref 27–34)
MCHC RBC-ENTMCNC: 30.9 GM/DL — LOW (ref 32–36)
MCV RBC AUTO: 95.9 FL — SIGNIFICANT CHANGE UP (ref 80–100)
MONOCYTES # BLD AUTO: 0.97 K/UL — HIGH (ref 0–0.9)
MONOCYTES NFR BLD AUTO: 8 % — SIGNIFICANT CHANGE UP (ref 2–14)
NEUTROPHILS # BLD AUTO: 9.55 K/UL — HIGH (ref 1.8–7.4)
NEUTROPHILS NFR BLD AUTO: 78.7 % — HIGH (ref 43–77)
NRBC # BLD: 0 /100 WBCS — SIGNIFICANT CHANGE UP (ref 0–0)
PHOSPHATE SERPL-MCNC: 3.3 MG/DL — SIGNIFICANT CHANGE UP (ref 2.5–4.5)
PLATELET # BLD AUTO: 241 K/UL — SIGNIFICANT CHANGE UP (ref 150–400)
POTASSIUM SERPL-MCNC: 4 MMOL/L — SIGNIFICANT CHANGE UP (ref 3.5–5.3)
POTASSIUM SERPL-SCNC: 4 MMOL/L — SIGNIFICANT CHANGE UP (ref 3.5–5.3)
RBC # BLD: 3.2 M/UL — LOW (ref 4.2–5.8)
RBC # FLD: 15.1 % — HIGH (ref 10.3–14.5)
SODIUM SERPL-SCNC: 145 MMOL/L — SIGNIFICANT CHANGE UP (ref 135–145)
WBC # BLD: 12.15 K/UL — HIGH (ref 3.8–10.5)
WBC # FLD AUTO: 12.15 K/UL — HIGH (ref 3.8–10.5)

## 2020-02-17 PROCEDURE — 71045 X-RAY EXAM CHEST 1 VIEW: CPT | Mod: 26

## 2020-02-17 PROCEDURE — 93010 ELECTROCARDIOGRAM REPORT: CPT

## 2020-02-17 PROCEDURE — 99291 CRITICAL CARE FIRST HOUR: CPT

## 2020-02-17 RX ORDER — LISINOPRIL 2.5 MG/1
2.5 TABLET ORAL DAILY
Refills: 0 | Status: DISCONTINUED | OUTPATIENT
Start: 2020-02-17 | End: 2020-02-18

## 2020-02-17 RX ORDER — FUROSEMIDE 40 MG
40 TABLET ORAL ONCE
Refills: 0 | Status: COMPLETED | OUTPATIENT
Start: 2020-02-17 | End: 2020-02-17

## 2020-02-17 RX ADMIN — Medication 3 MILLILITER(S): at 00:00

## 2020-02-17 RX ADMIN — DONEPEZIL HYDROCHLORIDE 5 MILLIGRAM(S): 10 TABLET, FILM COATED ORAL at 21:43

## 2020-02-17 RX ADMIN — PANTOPRAZOLE SODIUM 40 MILLIGRAM(S): 20 TABLET, DELAYED RELEASE ORAL at 05:54

## 2020-02-17 RX ADMIN — ATORVASTATIN CALCIUM 40 MILLIGRAM(S): 80 TABLET, FILM COATED ORAL at 21:43

## 2020-02-17 RX ADMIN — Medication 3 MILLILITER(S): at 09:23

## 2020-02-17 RX ADMIN — POLYETHYLENE GLYCOL 3350 17 GRAM(S): 17 POWDER, FOR SOLUTION ORAL at 18:20

## 2020-02-17 RX ADMIN — MEMANTINE HYDROCHLORIDE 10 MILLIGRAM(S): 10 TABLET ORAL at 18:20

## 2020-02-17 RX ADMIN — FINASTERIDE 5 MILLIGRAM(S): 5 TABLET, FILM COATED ORAL at 11:24

## 2020-02-17 RX ADMIN — LISINOPRIL 2.5 MILLIGRAM(S): 2.5 TABLET ORAL at 18:20

## 2020-02-17 RX ADMIN — LATANOPROST 1 DROP(S): 0.05 SOLUTION/ DROPS OPHTHALMIC; TOPICAL at 21:44

## 2020-02-17 RX ADMIN — AZITHROMYCIN 500 MILLIGRAM(S): 500 TABLET, FILM COATED ORAL at 11:24

## 2020-02-17 RX ADMIN — PANTOPRAZOLE SODIUM 40 MILLIGRAM(S): 20 TABLET, DELAYED RELEASE ORAL at 18:20

## 2020-02-17 RX ADMIN — Medication 2: at 21:43

## 2020-02-17 RX ADMIN — MEMANTINE HYDROCHLORIDE 10 MILLIGRAM(S): 10 TABLET ORAL at 05:45

## 2020-02-17 RX ADMIN — Medication 3 MILLILITER(S): at 15:33

## 2020-02-17 RX ADMIN — Medication 81 MILLIGRAM(S): at 11:24

## 2020-02-17 RX ADMIN — Medication 40 MILLIGRAM(S): at 13:48

## 2020-02-17 RX ADMIN — POLYETHYLENE GLYCOL 3350 17 GRAM(S): 17 POWDER, FOR SOLUTION ORAL at 05:45

## 2020-02-17 RX ADMIN — Medication 3 MILLILITER(S): at 23:59

## 2020-02-17 RX ADMIN — Medication 3 MILLILITER(S): at 03:07

## 2020-02-17 RX ADMIN — CEFTRIAXONE 100 MILLIGRAM(S): 500 INJECTION, POWDER, FOR SOLUTION INTRAMUSCULAR; INTRAVENOUS at 11:24

## 2020-02-17 RX ADMIN — CHLORHEXIDINE GLUCONATE 1 APPLICATION(S): 213 SOLUTION TOPICAL at 05:46

## 2020-02-17 NOTE — PROGRESS NOTE ADULT - SUBJECTIVE AND OBJECTIVE BOX
JEREMIAH REDDY  87y  Male    Patient is a 87y old  Male who presents with a chief complaint of Cardiogenic shock, GIB, NSTEMI (2020 11:53)      INTERVAL HPI/OVERNIGHT EVENTS: O/n the patient had a 100.6 F fever rectal, tylenol was given. The patient this morning denies CP, shortness of breath, lightheadedness, fever or chills.       T(C): 36.6 (20 @ 09:11), Max: 38.1 (20 @ 22:20)  HR: 90 (20 @ 09:00) (82 - 96)  BP: 112/61 (20 @ 09:00) (86/54 - 156/68)  RR: 22 (20 @ 09:00) (20 - 45)  SpO2: 100% (20 @ 09:00) (90% - 100%)  Wt(kg): --Vital Signs Last 24 Hrs  T(C): 36.6 (2020 09:11), Max: 38.1 (2020 22:20)  T(F): 97.8 (2020 09:11), Max: 100.6 (2020 22:20)  HR: 90 (2020 09:00) (82 - 96)  BP: 112/61 (2020 09:00) (86/54 - 156/68)  BP(mean): 83 (2020 09:00) (66 - 98)  RR: 22 (2020 09:00) (20 - 45)  SpO2: 100% (2020 09:00) (90% - 100%)    PHYSICAL EXAM:  GENERAL: NAD  HEAD: Atraumatic, Normocephalic  ENMT: Dry MM  NECK: Supple, No JVD  NERVOUS SYSTEM: Alert & Oriented X1 (Baseline)  CHEST/LUNG: Bibasilar crackles   HEART: Regular rate and rhythm; No murmurs, rubs, or gallops  ABDOMEN: Soft, Nontender, Nondistended; Bowel sounds present  EXTREMITIES:  WWP, No clubbing, cyanosis, or edema  Vascular: 2+ peripheral pulses x4  LYMPH: No lymphadenopathy noted  SKIN: No rashes or lesions    Consultant(s) Notes Reviewed:  [x ] YES  [ ] NO  Care Discussed with Consultants/Other Providers [ x] YES  [ ] NO    LABS:                        9.5    12.15 )-----------( 241      ( 2020 05:38 )             30.7     02-17    145  |  112<H>  |  27<H>  ----------------------------<  151<H>  4.0   |  20<L>  |  0.96    Ca    8.2<L>      2020 05:38  Phos  3.3     02-  Mg     2.3     02-17        PT/INR - ( 2020 05:23 )   PT: 18.5 sec;   INR: 1.60          PTT - ( 2020 05:23 )  PTT:27.2 sec  Urinalysis Basic - ( 15 Feb 2020 18:06 )    Color: Yellow / Appearance: Clear / S.025 / pH: x  Gluc: x / Ketone: NEGATIVE  / Bili: Negative / Urobili: 0.2 E.U./dL   Blood: x / Protein: Trace mg/dL / Nitrite: NEGATIVE   Leuk Esterase: NEGATIVE / RBC: Many /HPF / WBC < 5 /HPF   Sq Epi: x / Non Sq Epi: 0-5 /HPF / Bacteria: Present /HPF      CAPILLARY BLOOD GLUCOSE      POCT Blood Glucose.: 133 mg/dL (2020 06:45)  POCT Blood Glucose.: 124 mg/dL (2020 22:03)  POCT Blood Glucose.: 119 mg/dL (2020 16:48)  POCT Blood Glucose.: 189 mg/dL (2020 11:45)        Urinalysis Basic - ( 15 Feb 2020 18:06 )    Color: Yellow / Appearance: Clear / S.025 / pH: x  Gluc: x / Ketone: NEGATIVE  / Bili: Negative / Urobili: 0.2 E.U./dL   Blood: x / Protein: Trace mg/dL / Nitrite: NEGATIVE   Leuk Esterase: NEGATIVE / RBC: Many /HPF / WBC < 5 /HPF   Sq Epi: x / Non Sq Epi: 0-5 /HPF / Bacteria: Present /HPF        RADIOLOGY & ADDITIONAL TESTS:    Imaging Personally Reviewed:  [ ] YES  [ ] NO    HEALTH ISSUES - PROBLEM Dx: JEREMIAH REDDY  87y  Male    Patient is a 87y old  Male who presents with a chief complaint of Cardiogenic shock, GIB, NSTEMI (2020 11:53)      INTERVAL HPI/OVERNIGHT EVENTS: O/n the patient had a 100.6 F fever rectal, tylenol was given. The patient this morning denies CP, shortness of breath, lightheadedness, fever or chills.       T(C): 36.6 (20 @ 09:11), Max: 38.1 (20 @ 22:20)  HR: 90 (20 @ 09:00) (82 - 96)  BP: 112/61 (20 @ 09:00) (86/54 - 156/68)  RR: 22 (20 @ 09:00) (20 - 45)  SpO2: 100% (20 @ 09:00) (90% - 100%)  Wt(kg): --Vital Signs Last 24 Hrs  T(C): 36.6 (2020 09:11), Max: 38.1 (2020 22:20)  T(F): 97.8 (2020 09:11), Max: 100.6 (2020 22:20)  HR: 90 (2020 09:00) (82 - 96)  BP: 112/61 (2020 09:00) (86/54 - 156/68)  BP(mean): 83 (2020 09:00) (66 - 98)  RR: 22 (2020 09:00) (20 - 45)  SpO2: 100% (2020 09:00) (90% - 100%)    PHYSICAL EXAM:  GENERAL: NAD  HEAD: Atraumatic, Normocephalic  ENMT: Dry MM  NECK: Supple, No JVD  NERVOUS SYSTEM: Alert & Oriented X1 (Baseline)  CHEST/LUNG: Bibasilar crackles   HEART: Regular rate and rhythm; No murmurs, rubs, or gallops  ABDOMEN: Soft, Nontender, Nondistended; Bowel sounds present  EXTREMITIES:  Cool extremities x4, no edema   Vascular: 2+ UE pulses    Consultant(s) Notes Reviewed:  [x ] YES  [ ] NO  Care Discussed with Consultants/Other Providers [ x] YES  [ ] NO    LABS:                        9.5    12.15 )-----------( 241      ( 2020 05:38 )             30.7     02-17    145  |  112<H>  |  27<H>  ----------------------------<  151<H>  4.0   |  20<L>  |  0.96    Ca    8.2<L>      2020 05:38  Phos  3.3     02-17  Mg     2.3     02-17        PT/INR - ( 2020 05:23 )   PT: 18.5 sec;   INR: 1.60          PTT - ( 2020 05:23 )  PTT:27.2 sec  Urinalysis Basic - ( 15 Feb 2020 18:06 )    Color: Yellow / Appearance: Clear / S.025 / pH: x  Gluc: x / Ketone: NEGATIVE  / Bili: Negative / Urobili: 0.2 E.U./dL   Blood: x / Protein: Trace mg/dL / Nitrite: NEGATIVE   Leuk Esterase: NEGATIVE / RBC: Many /HPF / WBC < 5 /HPF   Sq Epi: x / Non Sq Epi: 0-5 /HPF / Bacteria: Present /HPF      CAPILLARY BLOOD GLUCOSE      POCT Blood Glucose.: 133 mg/dL (2020 06:45)  POCT Blood Glucose.: 124 mg/dL (2020 22:03)  POCT Blood Glucose.: 119 mg/dL (2020 16:48)  POCT Blood Glucose.: 189 mg/dL (2020 11:45)        Urinalysis Basic - ( 15 Feb 2020 18:06 )    Color: Yellow / Appearance: Clear / S.025 / pH: x  Gluc: x / Ketone: NEGATIVE  / Bili: Negative / Urobili: 0.2 E.U./dL   Blood: x / Protein: Trace mg/dL / Nitrite: NEGATIVE   Leuk Esterase: NEGATIVE / RBC: Many /HPF / WBC < 5 /HPF   Sq Epi: x / Non Sq Epi: 0-5 /HPF / Bacteria: Present /HPF        RADIOLOGY & ADDITIONAL TESTS:    Imaging Personally Reviewed:  [ ] YES  [ ] NO    HEALTH ISSUES - PROBLEM Dx:

## 2020-02-17 NOTE — PROGRESS NOTE ADULT - ASSESSMENT
87M w pmh CAD, hx CABG approx 10 years ago, DM2, hernia, dementia (AAOx2 at baseline) presents for NSTEMI, cardiogenic shock, GIB admitted to CCU for further management.     PLAN:     CARDIOVASCULAR  #Cardiogenic shock vs less likely hypovolemic shock from GIB  With known CAD, Hx of CABG. On arrival with BP 79/61, elevated lactate and LFTs, cool lower extremities, cool lower ext, altered mental status compared to baseline with CXR showing pulmonary congestion. ECHO showing EF 25%. CVP of 10 indicating less likely hypovolemia. Feet are cool on exam B/L.   - MAPs remaining in the 60s to 70s  - Lactate remains WNL       #NSTEMI vs Type II MI in the setting of GIB  EKG w anterolateral lead ST depressions w avR elevations with CAD and hx of CABG in 2002 with 2/3 grafts now occluded as of 2009. Given avR elevation possible LM disease. With concern for GIB, however rectal exam negative for blood. Trop T at [facility] 11.2. At [facility] Trop T .94, CKMB 33.2. Hypovolemia 2/2 from the GIB is causing a demand ischemia leading to elevated cardiac enzymes. Two melanotic stool o/n (2/11-2/12). Trops and CKM have been trended to peak. Hgb now stable at 8.7  - C/w ASA   - continue to monitor for signs of bleeding  - ST depressions remain with no worsening   - c/w lipitor 40mg  - Considering CCTA     #HFrEF  ECHO with EF of 25%  - Chest X ray with no change in congestion and CVP @ 10.   - 40mg lasix IVP this AM    - Starting lisinopril 2.5mg QD  - Consider starting metoprolol tomorrow   - Obtain repeat ECHO once HD stable and at ideal volume status         PULMONARY  #Pulmonary Edema  With B/L crackles, tachypnea, belly breathing on arrival. CXR w B/L pulmonary congestion. Bedside Echo with reduced EF per cards fellow. Not on diuretic at home. Bibasilar crackles on exam.   - CXR showing mild improvement of congestion   - CVP @ 10  - Lasix 40mg IVP this AM  - monitor urine output         GI  #GIB  With witnessed episode of melena, stool guiac positive at [facility] and received 1 u prbc, IV protonix 80 x 1 there. Pre transfusion hgb 10.4. With no additional episodes of melena, negative rectal exam. However with repeat hgb (post transfusion) at [facility] 9.8, tachycardia, hypotension, altered mental status. S/p 2 u pRBC. Patient had melanotic stool on 2/11 and 2 o/n into the 12th. EGD and CT A/P both found mass in the 2nd duodenum. EGD did not find source of bleeding. c-scope found no sources of bleeding with some diverticula   - Hgb 8.7 this am   - Protonix 40mg IVP BID  - 3 PIVs, two 22g, one 18g    ID  #Fever w/ leukocytosis   Pt experienced a 101.6 rectal temp on 2/11 along with the sustained leukocytosis. Leukocytosis may be in the setting of Type II MI and GIB. Leukocytosis is downtrending, fever curve is downtrending 100.7 on 2/12, then 100.2 on 2/13. Patient continues to have low-grade fevers despite treatment for CAP indicating that they may be due to NSTEMI that has not been re-vascularized vs. GI mass. s/p azithromycin. Bcx negative, RVP negative   - last day of CFTX 2/18      NEURO  W baseline mental status AAOx2. Currently AAOx1 likely in the setting of cardiogenic shock   - c/w home meds donepezil, memantine     RENAL  At [facility] Cr 1.12, with Cr .99 on arrival  - monitor BMP     ENDO  #DM2   A1c 6.7 this admission. On metformin 500 bid at home.  - c/w MISS      F - None  E - replete prn  N - pureed     DVT PPx - SCDs.     LINES - 3 PIVs, two 22s, one 18g. RIJ place 2/9        CODE - FULL    DISPO - CCU

## 2020-02-18 LAB
ALBUMIN SERPL ELPH-MCNC: 2.4 G/DL — LOW (ref 3.3–5)
ALBUMIN SERPL ELPH-MCNC: 2.7 G/DL — LOW (ref 3.3–5)
ALP SERPL-CCNC: 54 U/L — SIGNIFICANT CHANGE UP (ref 40–120)
ALP SERPL-CCNC: 58 U/L — SIGNIFICANT CHANGE UP (ref 40–120)
ALT FLD-CCNC: 33 U/L — SIGNIFICANT CHANGE UP (ref 10–45)
ALT FLD-CCNC: 38 U/L — SIGNIFICANT CHANGE UP (ref 10–45)
ANION GAP SERPL CALC-SCNC: 12 MMOL/L — SIGNIFICANT CHANGE UP (ref 5–17)
ANION GAP SERPL CALC-SCNC: 12 MMOL/L — SIGNIFICANT CHANGE UP (ref 5–17)
ANION GAP SERPL CALC-SCNC: 13 MMOL/L — SIGNIFICANT CHANGE UP (ref 5–17)
AST SERPL-CCNC: 22 U/L — SIGNIFICANT CHANGE UP (ref 10–40)
AST SERPL-CCNC: 26 U/L — SIGNIFICANT CHANGE UP (ref 10–40)
BASE EXCESS BLDA CALC-SCNC: 0.1 MMOL/L — SIGNIFICANT CHANGE UP (ref -2–3)
BASE EXCESS BLDA CALC-SCNC: 3.8 MMOL/L — HIGH (ref -2–3)
BILIRUB SERPL-MCNC: 0.3 MG/DL — SIGNIFICANT CHANGE UP (ref 0.2–1.2)
BILIRUB SERPL-MCNC: 0.4 MG/DL — SIGNIFICANT CHANGE UP (ref 0.2–1.2)
BUN SERPL-MCNC: 23 MG/DL — SIGNIFICANT CHANGE UP (ref 7–23)
BUN SERPL-MCNC: 25 MG/DL — HIGH (ref 7–23)
BUN SERPL-MCNC: 27 MG/DL — HIGH (ref 7–23)
CALCIUM SERPL-MCNC: 8.2 MG/DL — LOW (ref 8.4–10.5)
CALCIUM SERPL-MCNC: 8.3 MG/DL — LOW (ref 8.4–10.5)
CALCIUM SERPL-MCNC: 8.4 MG/DL — SIGNIFICANT CHANGE UP (ref 8.4–10.5)
CHLORIDE SERPL-SCNC: 115 MMOL/L — HIGH (ref 96–108)
CHLORIDE SERPL-SCNC: 117 MMOL/L — HIGH (ref 96–108)
CHLORIDE SERPL-SCNC: 117 MMOL/L — HIGH (ref 96–108)
CK MB CFR SERPL CALC: 1.9 NG/ML — SIGNIFICANT CHANGE UP (ref 0–6.7)
CK MB CFR SERPL CALC: 1.9 NG/ML — SIGNIFICANT CHANGE UP (ref 0–6.7)
CK MB CFR SERPL CALC: 2.2 NG/ML — SIGNIFICANT CHANGE UP (ref 0–6.7)
CK SERPL-CCNC: 124 U/L — SIGNIFICANT CHANGE UP (ref 30–200)
CK SERPL-CCNC: 161 U/L — SIGNIFICANT CHANGE UP (ref 30–200)
CO2 SERPL-SCNC: 20 MMOL/L — LOW (ref 22–31)
CO2 SERPL-SCNC: 20 MMOL/L — LOW (ref 22–31)
CO2 SERPL-SCNC: 21 MMOL/L — LOW (ref 22–31)
CREAT SERPL-MCNC: 0.94 MG/DL — SIGNIFICANT CHANGE UP (ref 0.5–1.3)
CREAT SERPL-MCNC: 0.98 MG/DL — SIGNIFICANT CHANGE UP (ref 0.5–1.3)
CREAT SERPL-MCNC: 1.01 MG/DL — SIGNIFICANT CHANGE UP (ref 0.5–1.3)
CULTURE RESULTS: SIGNIFICANT CHANGE UP
CULTURE RESULTS: SIGNIFICANT CHANGE UP
GAS PNL BLDA: SIGNIFICANT CHANGE UP
GLUCOSE BLDC GLUCOMTR-MCNC: 134 MG/DL — HIGH (ref 70–99)
GLUCOSE BLDC GLUCOMTR-MCNC: 139 MG/DL — HIGH (ref 70–99)
GLUCOSE BLDC GLUCOMTR-MCNC: 144 MG/DL — HIGH (ref 70–99)
GLUCOSE BLDC GLUCOMTR-MCNC: 151 MG/DL — HIGH (ref 70–99)
GLUCOSE SERPL-MCNC: 133 MG/DL — HIGH (ref 70–99)
GLUCOSE SERPL-MCNC: 158 MG/DL — HIGH (ref 70–99)
GLUCOSE SERPL-MCNC: 169 MG/DL — HIGH (ref 70–99)
HCO3 BLDA-SCNC: 23 MMOL/L — SIGNIFICANT CHANGE UP (ref 21–28)
HCO3 BLDA-SCNC: 26 MMOL/L — SIGNIFICANT CHANGE UP (ref 21–28)
HCT VFR BLD CALC: 29.7 % — LOW (ref 39–50)
HCT VFR BLD CALC: 30.2 % — LOW (ref 39–50)
HCT VFR BLD CALC: 30.3 % — LOW (ref 39–50)
HGB BLD-MCNC: 9 G/DL — LOW (ref 13–17)
HGB BLD-MCNC: 9.2 G/DL — LOW (ref 13–17)
HGB BLD-MCNC: 9.3 G/DL — LOW (ref 13–17)
LACTATE SERPL-SCNC: 1.1 MMOL/L — SIGNIFICANT CHANGE UP (ref 0.5–2)
LACTATE SERPL-SCNC: 1.3 MMOL/L — SIGNIFICANT CHANGE UP (ref 0.5–2)
LACTATE SERPL-SCNC: 1.7 MMOL/L — SIGNIFICANT CHANGE UP (ref 0.5–2)
MAGNESIUM SERPL-MCNC: 2.3 MG/DL — SIGNIFICANT CHANGE UP (ref 1.6–2.6)
MCHC RBC-ENTMCNC: 29.2 PG — SIGNIFICANT CHANGE UP (ref 27–34)
MCHC RBC-ENTMCNC: 29.3 PG — SIGNIFICANT CHANGE UP (ref 27–34)
MCHC RBC-ENTMCNC: 29.6 PG — SIGNIFICANT CHANGE UP (ref 27–34)
MCHC RBC-ENTMCNC: 30.3 GM/DL — LOW (ref 32–36)
MCHC RBC-ENTMCNC: 30.4 GM/DL — LOW (ref 32–36)
MCHC RBC-ENTMCNC: 30.8 GM/DL — LOW (ref 32–36)
MCV RBC AUTO: 95.3 FL — SIGNIFICANT CHANGE UP (ref 80–100)
MCV RBC AUTO: 96.2 FL — SIGNIFICANT CHANGE UP (ref 80–100)
MCV RBC AUTO: 97.7 FL — SIGNIFICANT CHANGE UP (ref 80–100)
NRBC # BLD: 0 /100 WBCS — SIGNIFICANT CHANGE UP (ref 0–0)
PCO2 BLDA: 31 MMHG — LOW (ref 35–48)
PCO2 BLDA: 31 MMHG — LOW (ref 35–48)
PH BLDA: 7.49 — HIGH (ref 7.35–7.45)
PH BLDA: 7.54 — HIGH (ref 7.35–7.45)
PHOSPHATE SERPL-MCNC: 2.8 MG/DL — SIGNIFICANT CHANGE UP (ref 2.5–4.5)
PLATELET # BLD AUTO: 235 K/UL — SIGNIFICANT CHANGE UP (ref 150–400)
PLATELET # BLD AUTO: 255 K/UL — SIGNIFICANT CHANGE UP (ref 150–400)
PLATELET # BLD AUTO: 267 K/UL — SIGNIFICANT CHANGE UP (ref 150–400)
PO2 BLDA: 116 MMHG — HIGH (ref 83–108)
PO2 BLDA: 96 MMHG — SIGNIFICANT CHANGE UP (ref 83–108)
POTASSIUM SERPL-MCNC: 3.9 MMOL/L — SIGNIFICANT CHANGE UP (ref 3.5–5.3)
POTASSIUM SERPL-MCNC: 4.4 MMOL/L — SIGNIFICANT CHANGE UP (ref 3.5–5.3)
POTASSIUM SERPL-MCNC: 4.4 MMOL/L — SIGNIFICANT CHANGE UP (ref 3.5–5.3)
POTASSIUM SERPL-SCNC: 3.9 MMOL/L — SIGNIFICANT CHANGE UP (ref 3.5–5.3)
POTASSIUM SERPL-SCNC: 4.4 MMOL/L — SIGNIFICANT CHANGE UP (ref 3.5–5.3)
POTASSIUM SERPL-SCNC: 4.4 MMOL/L — SIGNIFICANT CHANGE UP (ref 3.5–5.3)
PROT SERPL-MCNC: 5.6 G/DL — LOW (ref 6–8.3)
PROT SERPL-MCNC: 5.8 G/DL — LOW (ref 6–8.3)
RBC # BLD: 3.04 M/UL — LOW (ref 4.2–5.8)
RBC # BLD: 3.15 M/UL — LOW (ref 4.2–5.8)
RBC # BLD: 3.17 M/UL — LOW (ref 4.2–5.8)
RBC # FLD: 15 % — HIGH (ref 10.3–14.5)
RBC # FLD: 15.2 % — HIGH (ref 10.3–14.5)
RBC # FLD: 15.3 % — HIGH (ref 10.3–14.5)
SAO2 % BLDA: 98 % — SIGNIFICANT CHANGE UP (ref 95–100)
SAO2 % BLDA: 99 % — SIGNIFICANT CHANGE UP (ref 95–100)
SODIUM SERPL-SCNC: 148 MMOL/L — HIGH (ref 135–145)
SODIUM SERPL-SCNC: 149 MMOL/L — HIGH (ref 135–145)
SODIUM SERPL-SCNC: 150 MMOL/L — HIGH (ref 135–145)
SPECIMEN SOURCE: SIGNIFICANT CHANGE UP
SPECIMEN SOURCE: SIGNIFICANT CHANGE UP
TROPONIN T SERPL-MCNC: 1.2 NG/ML — CRITICAL HIGH (ref 0–0.01)
TROPONIN T SERPL-MCNC: 1.28 NG/ML — CRITICAL HIGH (ref 0–0.01)
TROPONIN T SERPL-MCNC: 1.38 NG/ML — CRITICAL HIGH (ref 0–0.01)
WBC # BLD: 10.04 K/UL — SIGNIFICANT CHANGE UP (ref 3.8–10.5)
WBC # BLD: 11.29 K/UL — HIGH (ref 3.8–10.5)
WBC # BLD: 11.73 K/UL — HIGH (ref 3.8–10.5)
WBC # FLD AUTO: 10.04 K/UL — SIGNIFICANT CHANGE UP (ref 3.8–10.5)
WBC # FLD AUTO: 11.29 K/UL — HIGH (ref 3.8–10.5)
WBC # FLD AUTO: 11.73 K/UL — HIGH (ref 3.8–10.5)

## 2020-02-18 PROCEDURE — 93970 EXTREMITY STUDY: CPT | Mod: 26

## 2020-02-18 PROCEDURE — 71045 X-RAY EXAM CHEST 1 VIEW: CPT | Mod: 26

## 2020-02-18 PROCEDURE — 99291 CRITICAL CARE FIRST HOUR: CPT

## 2020-02-18 RX ORDER — DOBUTAMINE HCL 250MG/20ML
3 VIAL (ML) INTRAVENOUS
Qty: 1000 | Refills: 0 | Status: DISCONTINUED | OUTPATIENT
Start: 2020-02-18 | End: 2020-02-18

## 2020-02-18 RX ORDER — POTASSIUM CHLORIDE 20 MEQ
20 PACKET (EA) ORAL ONCE
Refills: 0 | Status: COMPLETED | OUTPATIENT
Start: 2020-02-18 | End: 2020-02-18

## 2020-02-18 RX ORDER — DOBUTAMINE HCL 250MG/20ML
1 VIAL (ML) INTRAVENOUS
Qty: 1000 | Refills: 0 | Status: DISCONTINUED | OUTPATIENT
Start: 2020-02-18 | End: 2020-02-18

## 2020-02-18 RX ORDER — FUROSEMIDE 40 MG
40 TABLET ORAL ONCE
Refills: 0 | Status: COMPLETED | OUTPATIENT
Start: 2020-02-18 | End: 2020-02-18

## 2020-02-18 RX ORDER — DOBUTAMINE HCL 250MG/20ML
2.5 VIAL (ML) INTRAVENOUS
Qty: 500 | Refills: 0 | Status: DISCONTINUED | OUTPATIENT
Start: 2020-02-18 | End: 2020-02-18

## 2020-02-18 RX ORDER — FUROSEMIDE 40 MG
20 TABLET ORAL ONCE
Refills: 0 | Status: DISCONTINUED | OUTPATIENT
Start: 2020-02-18 | End: 2020-02-18

## 2020-02-18 RX ADMIN — Medication 40 MILLIGRAM(S): at 23:44

## 2020-02-18 RX ADMIN — MEMANTINE HYDROCHLORIDE 10 MILLIGRAM(S): 10 TABLET ORAL at 06:00

## 2020-02-18 RX ADMIN — Medication 20 MILLIEQUIVALENT(S): at 06:32

## 2020-02-18 RX ADMIN — Medication 3 MILLILITER(S): at 06:05

## 2020-02-18 RX ADMIN — LISINOPRIL 2.5 MILLIGRAM(S): 2.5 TABLET ORAL at 06:00

## 2020-02-18 RX ADMIN — Medication 3 MILLILITER(S): at 16:02

## 2020-02-18 RX ADMIN — Medication 3 MILLILITER(S): at 22:43

## 2020-02-18 RX ADMIN — Medication 3 MILLILITER(S): at 11:12

## 2020-02-18 RX ADMIN — LATANOPROST 1 DROP(S): 0.05 SOLUTION/ DROPS OPHTHALMIC; TOPICAL at 22:15

## 2020-02-18 RX ADMIN — CHLORHEXIDINE GLUCONATE 1 APPLICATION(S): 213 SOLUTION TOPICAL at 06:01

## 2020-02-18 RX ADMIN — PANTOPRAZOLE SODIUM 40 MILLIGRAM(S): 20 TABLET, DELAYED RELEASE ORAL at 06:00

## 2020-02-18 RX ADMIN — ATORVASTATIN CALCIUM 40 MILLIGRAM(S): 80 TABLET, FILM COATED ORAL at 22:14

## 2020-02-18 RX ADMIN — Medication 2: at 12:06

## 2020-02-18 RX ADMIN — Medication 81 MILLIGRAM(S): at 12:06

## 2020-02-18 RX ADMIN — FINASTERIDE 5 MILLIGRAM(S): 5 TABLET, FILM COATED ORAL at 12:06

## 2020-02-18 RX ADMIN — CEFTRIAXONE 100 MILLIGRAM(S): 500 INJECTION, POWDER, FOR SOLUTION INTRAMUSCULAR; INTRAVENOUS at 12:06

## 2020-02-18 RX ADMIN — PANTOPRAZOLE SODIUM 40 MILLIGRAM(S): 20 TABLET, DELAYED RELEASE ORAL at 17:35

## 2020-02-18 RX ADMIN — Medication 5.12 MICROGRAM(S)/KG/MIN: at 16:47

## 2020-02-18 RX ADMIN — DONEPEZIL HYDROCHLORIDE 5 MILLIGRAM(S): 10 TABLET, FILM COATED ORAL at 22:14

## 2020-02-18 RX ADMIN — POLYETHYLENE GLYCOL 3350 17 GRAM(S): 17 POWDER, FOR SOLUTION ORAL at 06:00

## 2020-02-18 NOTE — PROGRESS NOTE ADULT - ASSESSMENT
87M w pmh CAD, hx CABG approx 10 years ago, DM2, hernia, dementia (AAOx2 at baseline) presents for NSTEMI, cardiogenic shock, GIB admitted to CCU for further management.     PLAN:       CARDIOVASCULAR  #Cardiogenic shock vs less likely hypovolemic shock from GIB  With known CAD, Hx of CABG. On arrival with BP 79/61, elevated lactate and LFTs, cool lower extremities, cool lower ext, altered mental status compared to baseline with CXR showing pulmonary congestion. ECHO showing EF 25%. CVP of 15 indicating less likely hypovolemia. Extremities cool x4 on exam.    - MAPs remaining in the 60s to 70s  - Lactate remains WNL  - UOP remains adequate   - CXR showing improving pulmonary congestion        #NSTEMI vs Type II MI in the setting of GIB  EKG w anterolateral lead ST depressions w avR elevations with CAD and hx of CABG in 2002 with 2/3 grafts now occluded as of 2009. Given avR elevation possible LM disease. With concern for GIB, however rectal exam negative for blood. [facility]. At [facility] Trop T .94, CKMB 33.2. Hypovolemia 2/2 from the GIB is causing a demand ischemia leading to elevated cardiac enzymes. Two melanotic stool o/n (2/11-2/12). Trops and CKM have been trended to peak. Hgb now stable at 8.7. ST depressions remain with no worsening   - C/w ASA   - continue to monitor for signs of bleeding  - c/w lipitor 40mg  - Possible CCTA, will have to see how the blood pressure trends as patient will need beta blockade for imaging       #HFrEF  ECHO with EF of 25%  - Chest X ray with improvement in congestion and CVP @ 15.   - No diuretics today   - C/w lisinopril 2.5mg QD  - Consider starting metoprolol if blood pressures can handle it   - Obtain repeat ECHO once HD stable and at ideal volume status         PULMONARY  #Pulmonary Edema  With B/L crackles, tachypnea, belly breathing on arrival. CXR w B/L pulmonary congestion. Bedside Echo with reduced EF per cards fellow. Not on diuretic at home. Bibasilar crackles on exam.   - CXR showing mild improvement of congestion   - CVP @ 15  - monitor urine output, adequate UOP over the past 24hrs         GI  #GIB  With witnessed episode of melena, stool guiac positive at [facility] and received 1 u prbc, IV protonix 80 x 1 there. Pre transfusion hgb 10.4. With no additional episodes of melena, negative rectal exam. However with repeat hgb (post transfusion) at [facility] 9.8, tachycardia, hypotension, altered mental status. S/p 2 u pRBC. Patient had melanotic stool on 2/11 and 2 o/n into the 12th. EGD and CT A/P both found mass in the 2nd duodenum. EGD did not find source of bleeding. c-scope found no sources of bleeding with some diverticula   - Hgb 8.7 this am   - Protonix 40mg IVP BID  - 3 PIVs, two 22g, one 18g    ID  #Fever w/ leukocytosis   Pt experienced a 101.6 rectal temp on 2/11 along with the sustained leukocytosis. Leukocytosis may be in the setting of Type II MI and GIB. Leukocytosis is downtrending, fever curve is downtrending 100.7 on 2/12, then 100.2 on 2/13. Patient continues to have low-grade fevers despite treatment for CAP indicating that they may be due to NSTEMI that has not been re-vascularized vs. GI mass. s/p azithromycin. Bcx negative, RVP negative   - last day of CFTX 2/18      NEURO  Baseline mental status AAOx2. On the am of 2/18 the patient became stuporous with minimal responsiveness.  - F/u CT head  - Lactate negative  - c/w home meds donepezil, memantine     RENAL  #Hypernatremia 148 on 2/18 from 145 on 2/17  - Holding diuresis  -     At [facility] Cr 1.12, with Cr .99 on arrival  - monitor BMP     ENDO  #DM2   A1c 6.7 this admission. On metformin 500 bid at home.  - c/w MISS      F - None  E - replete prn  N - pureed     DVT PPx - SCDs.     LINES - 3 PIVs, two 22s, one 18g. RIJ place 2/9        CODE - FULL    DISPO - CCU 87M w pmh CAD, hx CABG approx 10 years ago, DM2, hernia, dementia (AAOx2 at baseline) presents for NSTEMI, cardiogenic shock, GIB admitted to CCU for further management.     PLAN:       CARDIOVASCULAR  #Cardiogenic shock   With known CAD, Hx of CABG. On arrival with BP 79/61, elevated lactate and LFTs, cool lower extremities, cool lower ext, altered mental status compared to baseline with CXR showing pulmonary congestion. ECHO showing EF 25%. CVP of 15 indicating less likely hypovolemia.  - MAPs remaining in the 60s to 70s  - Lactate remains WNL  - UOP remains adequate   - Mental status decreased today  - Extremities cool x4 on exam  - CXR showing improving pulmonary congestion, but still fluid overloaded        #NSTEMI vs Type II MI in the setting of GIB  EKG w anterolateral lead ST depressions w avR elevations with CAD and hx of CABG in 2002 with 2/3 grafts now occluded as of 2009. Given avR elevation possible LM disease. With concern for GIB, however rectal exam negative for blood. [facility] At [facility] Trop T .94, CKMB 33.2. Hypovolemia 2/2 from the GIB is causing a demand ischemia leading to elevated cardiac enzymes. Two melanotic stool o/n (2/11-2/12). Hgb now stable at 9.3. ST depressions remain with no worsening.   - Trop 1.38 (previous 2.3), CKMB and CK all decreased from last value, will trend at 4pm today to ensure downtrending    - C/w ASA   - continue to monitor for signs of bleeding  - c/w lipitor 40mg  - Holding of on CCTA as patient will not be able to tolerate beta blockade required for imaging     #HFrEF  ECHO with EF of 25%  - Chest X ray with marginal improvement in congestion and CVP @ 15.   - 20mg lasix in afternoon of 2/18   - C/w lisinopril 2.5mg QD  - Will continue to hold off on starting beta blockers due to decreased output state, fluid overload status and soft MAPs   - Obtain repeat ECHO once HD stable and at ideal volume status         PULMONARY  #Pulmonary Edema  With B/L crackles, tachypnea, belly breathing on arrival. CXR w B/L pulmonary congestion. Bedside Echo with reduced EF per cards fellow. Not on diuretic at home. Bibasilar crackles on exam.   - CXR showing mild improvement of congestion   - CVP @ 15  - monitor urine output, adequate UOP over the past 24hrs         GI  #GIB  With witnessed episode of melena, stool guiac positive at [facility] and received 1 u prbc, IV protonix 80 x 1 there. Pre transfusion hgb 10.4. With no additional episodes of melena, negative rectal exam. However with repeat hgb (post transfusion) at [facility] 9.8, tachycardia, hypotension, altered mental status. S/p 2 u pRBC. Patient had melanotic stool on 2/11 and 2 o/n into the 12th. EGD and CT A/P both found mass in the 2nd duodenum. EGD did not find source of bleeding. c-scope found no sources of bleeding with some diverticula   - Hgb 8.7 this am   - Protonix 40mg IVP BID  - 3 PIVs, two 22g, one 18g    ID  #Fever w/ leukocytosis   Pt experienced a 101.6 rectal temp on 2/11 along with the sustained leukocytosis. Leukocytosis may be in the setting of Type II MI and GIB. Leukocytosis is downtrending, fever curve is downtrending 100.7 on 2/12, then 100.2 on 2/13. Patient continues to have low-grade fevers despite treatment for CAP indicating that they may be due to NSTEMI that has not been re-vascularized vs. GI mass. s/p azithromycin. Bcx negative, RVP negative   - last day of CFTX 2/18      NEURO  Baseline mental status AAOx2. On the am of 2/18 the patient became stuporous with minimal responsiveness.  - CT head if mental status does not improve throughout the day   - Lactate negative  - c/w home meds donepezil, memantine     RENAL  #Hypernatremia 148 on 2/18 from 145 on 2/17  - C/w monitoring   - No fluids at this time due to fluid overload status    At [facility] Cr 1.12, with Cr .99 on arrival  - monitor BMP     ENDO  #DM2   A1c 6.7 this admission. On metformin 500 bid at home.  - c/w MISS      F - None  E - replete prn  N - pureed     DVT PPx - SCDs.     LINES - 3 PIVs, two 22s, one 18g. RIJ place 2/9        CODE - FULL    DISPO - CCU 87M w pmh CAD, hx CABG approx 10 years ago, DM2, hernia, dementia (AAOx2 at baseline) presents for NSTEMI, cardiogenic shock, GIB admitted to CCU for further management.     PLAN:       CARDIOVASCULAR  #Cardiogenic shock   With known CAD, Hx of CABG. On arrival with BP 79/61, elevated lactate and LFTs, cool lower extremities, cool lower ext, altered mental status compared to baseline with CXR showing pulmonary congestion. ECHO showing EF 25%. CVP of 15 indicating less likely hypovolemia.  - MAPs remaining in the 60s to 70s o/n, but decreased to high 50s low 60s after 2.5mg of lisinopril   - Lactate remains WNL  - UOP remains adequate   - Mental status decreased this am, but improved later in the am  - Extremities cool x4 on exam  - CXR showing improving pulmonary congestion, but still fluid overloaded        #NSTEMI vs Type II MI in the setting of GIB  EKG w anterolateral lead ST depressions w avR elevations with CAD and hx of CABG in 2002 with 2/3 grafts now occluded as of 2009. Given avR elevation possible LM disease. With concern for GIB, however rectal exam negative for blood. Trop T at [facility] 11.2. At [facility] Trop T .94, CKMB 33.2. Hypovolemia 2/2 from the GIB is causing a demand ischemia leading to elevated cardiac enzymes. Two melanotic stool o/n (2/11-2/12). Hgb now stable at 9.3. ST depressions remain with no worsening.   - Trop 1.38 (previous 2.3), CKMB and CK all decreased from last value, will trend at 4pm today to ensure downtrending    - C/w ASA   - c/w lipitor 40mg  - c/w lisinopril 2.5mg QD   - Holding of on CCTA as patient will not be able to tolerate beta blockade required for imaging     #HFrEF  ECHO with EF of 25%  - Chest X ray with marginal improvement in congestion and CVP @ 15.   - 20mg lasix in afternoon of 2/18   - C/w lisinopril 2.5mg QD  - Will continue to hold off on starting beta blockers due to decreased output state, fluid overload status and soft MAPs   - Obtain repeat ECHO once HD stable and at ideal volume status         PULMONARY  #Pulmonary Edema  With B/L crackles, tachypnea, belly breathing on arrival. CXR w B/L pulmonary congestion. Bedside Echo with reduced EF per cards fellow. Not on diuretic at home. Bibasilar crackles on exam.   - CXR showing mild improvement of congestion   - CVP @ 15  - monitor urine output, adequate UOP over the past 24hrs   - Lasix 20mg later today         GI  #GIB  With witnessed episode of melena, stool guiac positive at [facility] and received 1 u prbc, IV protonix 80 x 1 there. Pre transfusion hgb 10.4. With no additional episodes of melena, negative rectal exam. However with repeat hgb (post transfusion) at [facility] 9.8, tachycardia, hypotension, altered mental status. S/p 2 u pRBC. Patient had melanotic stool on 2/11 and 2 o/n into the 12th. EGD and CT A/P both found mass in the 2nd duodenum. EGD did not find source of bleeding. c-scope found no sources of bleeding with some diverticula   - Hgb 8.7 this am   - Protonix 40mg IVP BID  - 3 PIVs, two 22g, one 18g    ID  #Fever w/ leukocytosis   Pt experienced a 101.6 rectal temp on 2/11 along with the sustained leukocytosis. Leukocytosis may be in the setting of Type II MI and GIB. Leukocytosis is downtrending, fever curve is downtrending 100.7 on 2/12, then 100.2 on 2/13. Patient continues to have low-grade fevers despite treatment for CAP indicating that they may be due to NSTEMI that has not been re-vascularized vs. GI mass. s/p azithromycin. Bcx negative, RVP negative   - last day of CFTX 2/18      NEURO  Baseline mental status AAOx2. On the am of 2/18 the patient became stuporous with minimal responsiveness.  - CT head if mental status does not improve throughout the day   - Lactate negative  - c/w home meds donepezil, memantine     RENAL  #Hypernatremia 148 on 2/18 from 145 on 2/17  - C/w monitoring   - No fluids at this time due to fluid overload status    At [facility] Cr 1.12, with Cr .99 on arrival  - monitor BMP     ENDO  #DM2   A1c 6.7 this admission. On metformin 500 bid at home.  - c/w MISS      F - None  E - replete prn  N - pureed     DVT PPx - SCDs.     LINES - 3 PIVs, two 22s, one 18g. [facility] place 2/9        CODE - FULL    DISPO - CCU

## 2020-02-18 NOTE — SWALLOW BEDSIDE ASSESSMENT ADULT - COMMENTS
Pt received asleep in bed, roused minimally to verbal cues, did not sustain arousal. No verbal responses to wh- questions elicited. Pt nodded his head slightly in response to yes/no questions, though was unreliable. Pt did not attempt to follow 1-step directives.    Per RN, during a 30 minute period of wakefulness today, pt was fed puree diet w/ thin liquids. Pt tolerated puree w/ thins without signs of aspiration or difficulty.

## 2020-02-18 NOTE — PROGRESS NOTE ADULT - SUBJECTIVE AND OBJECTIVE BOX
REDDY DANIELS  87y  Male    Patient is a 87y old  Male who presents with a chief complaint of Cardiogenic shock, GIB, NSTEMI (17 Feb 2020 09:30)      INTERVAL HPI/OVERNIGHT EVENTS: No acute events overnight. This am the patient was stuporous and generally less responsive than in previous days. An ABG, lactate, Trop, CKMB and CK were all obtained and a CTH non con was ordered. The patient's O2 saturation dropped to 92 so 2L NC was placed with improvement into the high 90s. The patient's tachypnea improved slightly with the supplemental O2. Troponin came back at 1.38 (2.3 previously), lactate was negative. EKG unchanged. Patient unable to communicate any complaints.        T(C): 36.7 (02-18-20 @ 04:46), Max: 37 (02-17-20 @ 12:00)  HR: 77 (02-18-20 @ 08:00) (77 - 95)  BP: 92/51 (02-18-20 @ 07:00) (82/48 - 114/58)  RR: 24 (02-18-20 @ 08:00) (18 - 37)  SpO2: 97% (02-18-20 @ 08:00) (93% - 100%)  Wt(kg): --Vital Signs Last 24 Hrs  T(C): 36.7 (18 Feb 2020 04:46), Max: 37 (17 Feb 2020 12:00)  T(F): 98 (18 Feb 2020 04:46), Max: 98.6 (17 Feb 2020 12:00)  HR: 77 (18 Feb 2020 08:00) (77 - 95)  BP: 92/51 (18 Feb 2020 07:00) (82/48 - 114/58)  BP(mean): 68 (18 Feb 2020 07:00) (60 - 83)  RR: 24 (18 Feb 2020 08:00) (18 - 37)  SpO2: 97% (18 Feb 2020 08:00) (93% - 100%)    PHYSICAL EXAM:  GENERAL: Stupor  HEAD: Atraumatic, Normocephalic  EYES: B/L pinpoint pupils, minimally reactive to light, conjunctiva and sclera clear  ENMT: Dry MM  NECK: Supple, No JVD  NERVOUS SYSTEM:  Alert & Oriented X0, Stuporous. Following simple commands, responsive to pain x4 extremities. Unable to perform full neuro exam   CHEST/LUNG: Bibasilar crackles (improved from previous exams)  HEART: Regular rate and rhythm; No murmurs, rubs, or gallops  ABDOMEN: Soft, Nontender, Nondistended; Bowel sounds present  EXTREMITIES:  No edema x4, cool extremities x4  Vascular: 2+UE pulses, no palpable pulses in the LE B/L      Consultant(s) Notes Reviewed:  [x ] YES  [ ] NO  Care Discussed with Consultants/Other Providers [ x] YES  [ ] NO    LABS:                        9.3    11.29 )-----------( 267      ( 18 Feb 2020 05:25 )             30.2     02-18    148<H>  |  115<H>  |  27<H>  ----------------------------<  158<H>  3.9   |  21<L>  |  1.01    Ca    8.2<L>      18 Feb 2020 05:25  Phos  2.8     02-18  Mg     2.3     02-18            CAPILLARY BLOOD GLUCOSE      POCT Blood Glucose.: 144 mg/dL (18 Feb 2020 06:29)  POCT Blood Glucose.: 188 mg/dL (17 Feb 2020 21:38)  POCT Blood Glucose.: 125 mg/dL (17 Feb 2020 16:19)  POCT Blood Glucose.: 147 mg/dL (17 Feb 2020 11:22)      ABG - ( 18 Feb 2020 08:12 )  pH, Arterial: 7.54  pH, Blood: x     /  pCO2: 31    /  pO2: 96    / HCO3: 26    / Base Excess: 3.8   /  SaO2: 98                    RADIOLOGY & ADDITIONAL TESTS:    Imaging Personally Reviewed:  [ ] YES  [ ] NO    HEALTH ISSUES - PROBLEM Dx: REDDY DANIELS  87y  Male    Patient is a 87y old  Male who presents with a chief complaint of Cardiogenic shock, GIB, NSTEMI (17 Feb 2020 09:30)    Hospital Course:    87M w pmh CAD, 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 On 2/9, pt was at home and became more confused HHA. Patient was taken to [facility] where he had one melanotic stool (Hgb 10.2) and positive trops signifying a NSTEMI. Patient received 1u pRBC at [facility] due to stool and shock picture.  Patient was transferred to [facility]. Patient was found to be in cardiogenic shock w/ pulmonary edema on arrival and was started on a dobutamine drip.  Cardiac enzymes upon [facility] arrival were .94 and 33.2, they continued to uptrend clouding the picture between a NSTEMI vs Type II MI from GIB. ECHO was done showing an EF of 25%, patient's shock picture looked to be improving on dobutamine. Patient's Hgb dropped to 9 with 2 melanotic stool and another U pRBC was given. C-scope and EGD did not find a source of bleeding but identified a duodenal mass, biopsy was only of the mucosa surrounding the mass which was negative, but unknown pathology of the mass itself. Patient had to be intubated for these procedures, but the patient was extubated the day after and dobutmaine was weened off. It was then attempted to start the patient on ASA, plavix and heparin to treat the possible NTEMI, but the patient's Hgb then dropped and all meds were stopped. Hgb remained stable afterwards and only ASA was restarted. No diagnostic/interventional cath for the NSTEMI could be done due to patient's inability to be anticoagulated after possible stent placement. CCTA could not be performed      INTERVAL HPI/OVERNIGHT EVENTS: No acute events overnight. This am the patient was stuporous and generally less responsive than in previous days. An ABG, lactate, Trop, CKMB and CK were all obtained and a CTH non con was ordered. The patient's O2 saturation dropped to 92 so 2L NC was placed with improvement into the high 90s. The patient's tachypnea improved slightly with the supplemental O2. Troponin came back at 1.38 (2.3 previously), lactate was negative. EKG unchanged. Patient unable to communicate any complaints.        T(C): 36.7 (02-18-20 @ 04:46), Max: 37 (02-17-20 @ 12:00)  HR: 77 (02-18-20 @ 08:00) (77 - 95)  BP: 92/51 (02-18-20 @ 07:00) (82/48 - 114/58)  RR: 24 (02-18-20 @ 08:00) (18 - 37)  SpO2: 97% (02-18-20 @ 08:00) (93% - 100%)  Wt(kg): --Vital Signs Last 24 Hrs  T(C): 36.7 (18 Feb 2020 04:46), Max: 37 (17 Feb 2020 12:00)  T(F): 98 (18 Feb 2020 04:46), Max: 98.6 (17 Feb 2020 12:00)  HR: 77 (18 Feb 2020 08:00) (77 - 95)  BP: 92/51 (18 Feb 2020 07:00) (82/48 - 114/58)  BP(mean): 68 (18 Feb 2020 07:00) (60 - 83)  RR: 24 (18 Feb 2020 08:00) (18 - 37)  SpO2: 97% (18 Feb 2020 08:00) (93% - 100%)    PHYSICAL EXAM:  GENERAL: Stupor  HEAD: Atraumatic, Normocephalic  EYES: B/L pinpoint pupils, minimally reactive to light, conjunctiva and sclera clear  ENMT: Dry MM  NECK: Supple, No JVD  NERVOUS SYSTEM:  Alert & Oriented X0, Stuporous. Following simple commands, responsive to pain x4 extremities. Unable to perform full neuro exam   CHEST/LUNG: Bibasilar crackles (improved from previous exams)  HEART: Regular rate and rhythm; No murmurs, rubs, or gallops  ABDOMEN: Soft, Nontender, Nondistended; Bowel sounds present  EXTREMITIES:  No edema x4, cool extremities x4  Vascular: 2+UE pulses, no palpable pulses in the LE B/L      Consultant(s) Notes Reviewed:  [x ] YES  [ ] NO  Care Discussed with Consultants/Other Providers [ x] YES  [ ] NO    LABS:                        9.3    11.29 )-----------( 267      ( 18 Feb 2020 05:25 )             30.2     02-18    148<H>  |  115<H>  |  27<H>  ----------------------------<  158<H>  3.9   |  21<L>  |  1.01    Ca    8.2<L>      18 Feb 2020 05:25  Phos  2.8     02-18  Mg     2.3     02-18            CAPILLARY BLOOD GLUCOSE      POCT Blood Glucose.: 144 mg/dL (18 Feb 2020 06:29)  POCT Blood Glucose.: 188 mg/dL (17 Feb 2020 21:38)  POCT Blood Glucose.: 125 mg/dL (17 Feb 2020 16:19)  POCT Blood Glucose.: 147 mg/dL (17 Feb 2020 11:22)      ABG - ( 18 Feb 2020 08:12 )  pH, Arterial: 7.54  pH, Blood: x     /  pCO2: 31    /  pO2: 96    / HCO3: 26    / Base Excess: 3.8   /  SaO2: 98                    RADIOLOGY & ADDITIONAL TESTS:    Imaging Personally Reviewed:  [ ] YES  [ ] NO    HEALTH ISSUES - PROBLEM Dx: REDDY DANIELS  87y  Male    Patient is a 87y old  Male who presents with a chief complaint of Cardiogenic shock, GIB, NSTEMI (17 Feb 2020 09:30)    Hospital Course:    87M w pmh CAD, 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 On 2/9, pt was at home and became more confused HHA. Patient was taken to [facility] where he had one melanotic stool (Hgb 10.2) and positive trops signifying a NSTEMI. Patient received 1u pRBC at [facility] due to stool and shock picture.  Patient was transferred to [facility]. Patient was found to be in cardiogenic shock w/ pulmonary edema on arrival and was started on a dobutamine drip.  Cardiac enzymes upon [facility] arrival were .94 and 33.2, they continued to uptrend clouding the picture between a NSTEMI vs Type II MI from GIB. ECHO was done showing an EF of 25%, patient's shock picture looked to be improving on dobutamine. Patient's Hgb dropped to 9 with 2 melanotic stool and another U pRBC was given. C-scope and EGD did not find a source of bleeding but identified a duodenal mass, biopsy was only of the mucosa surrounding the mass which was negative, but unknown pathology of the mass itself. Patient had to be intubated for these procedures, but the patient was extubated the day after and dobutmaine was weened off. It was then attempted to start the patient on ASA, plavix and heparin to treat the possible NTEMI, but the patient's Hgb then dropped and all meds were stopped. Hgb remained stable afterwards and only ASA was restarted. No diagnostic/interventional cath for the NSTEMI could be done due to patient's inability to be anticoagulated after possible stent placement. CCTA could not be performed due to inability to tolerate beta blockade. The patient is now on lisinopril, atorvastatin and ASA for the NSTEMI and HFrEF.      INTERVAL HPI/OVERNIGHT EVENTS: No acute events overnight. This am the patient was stuporous and generally less responsive than in previous days. An ABG, lactate, Trop, CKMB and CK were all obtained and a CTH non con was ordered. The patient's O2 saturation dropped to 92 so 2L NC was placed with improvement into the high 90s. The patient's tachypnea improved slightly with the supplemental O2. Troponin came back at 1.38 (2.3 previously), lactate was negative. EKG unchanged. Patient unable to communicate any complaints.        T(C): 36.7 (02-18-20 @ 04:46), Max: 37 (02-17-20 @ 12:00)  HR: 77 (02-18-20 @ 08:00) (77 - 95)  BP: 92/51 (02-18-20 @ 07:00) (82/48 - 114/58)  RR: 24 (02-18-20 @ 08:00) (18 - 37)  SpO2: 97% (02-18-20 @ 08:00) (93% - 100%)  Wt(kg): --Vital Signs Last 24 Hrs  T(C): 36.7 (18 Feb 2020 04:46), Max: 37 (17 Feb 2020 12:00)  T(F): 98 (18 Feb 2020 04:46), Max: 98.6 (17 Feb 2020 12:00)  HR: 77 (18 Feb 2020 08:00) (77 - 95)  BP: 92/51 (18 Feb 2020 07:00) (82/48 - 114/58)  BP(mean): 68 (18 Feb 2020 07:00) (60 - 83)  RR: 24 (18 Feb 2020 08:00) (18 - 37)  SpO2: 97% (18 Feb 2020 08:00) (93% - 100%)    PHYSICAL EXAM:  GENERAL: Stupor  HEAD: Atraumatic, Normocephalic  EYES: B/L pinpoint pupils, minimally reactive to light, conjunctiva and sclera clear  ENMT: Dry MM  NECK: Supple, No JVD  NERVOUS SYSTEM:  Alert & Oriented X0, Stuporous. Following simple commands, responsive to pain x4 extremities. Unable to perform full neuro exam   CHEST/LUNG: Bibasilar crackles (improved from previous exams)  HEART: Regular rate and rhythm; No murmurs, rubs, or gallops  ABDOMEN: Soft, Nontender, Nondistended; Bowel sounds present  EXTREMITIES:  No edema x4, cool extremities x4  Vascular: 2+UE pulses, no palpable pulses in the LE B/L      Consultant(s) Notes Reviewed:  [x ] YES  [ ] NO  Care Discussed with Consultants/Other Providers [ x] YES  [ ] NO    LABS:                        9.3    11.29 )-----------( 267      ( 18 Feb 2020 05:25 )             30.2     02-18    148<H>  |  115<H>  |  27<H>  ----------------------------<  158<H>  3.9   |  21<L>  |  1.01    Ca    8.2<L>      18 Feb 2020 05:25  Phos  2.8     02-18  Mg     2.3     02-18            CAPILLARY BLOOD GLUCOSE      POCT Blood Glucose.: 144 mg/dL (18 Feb 2020 06:29)  POCT Blood Glucose.: 188 mg/dL (17 Feb 2020 21:38)  POCT Blood Glucose.: 125 mg/dL (17 Feb 2020 16:19)  POCT Blood Glucose.: 147 mg/dL (17 Feb 2020 11:22)      ABG - ( 18 Feb 2020 08:12 )  pH, Arterial: 7.54  pH, Blood: x     /  pCO2: 31    /  pO2: 96    / HCO3: 26    / Base Excess: 3.8   /  SaO2: 98                    RADIOLOGY & ADDITIONAL TESTS:    Imaging Personally Reviewed:  [ ] YES  [ ] NO    HEALTH ISSUES - PROBLEM Dx: REDDY DANIELS  87y  Male    Patient is a 87y old  Male who presents with a chief complaint of Cardiogenic shock, GIB, NSTEMI (17 Feb 2020 09:30)    Hospital Course:    87M w pmh CAD, 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 On 2/9, pt was at home and became more confused HHA. Patient was taken to [facility] where he had one melanotic stool (Hgb 10.2) and positive trops signifying a NSTEMI. Patient received 1u pRBC at [facility] due to stool and shock picture.  Patient was transferred to [facility]. Patient was found to be in cardiogenic shock w/ pulmonary edema on arrival and was started on a dobutamine drip.  Cardiac enzymes upon [facility] arrival were .94 and 33.2, they continued to uptrend clouding the picture between a NSTEMI vs Type II MI from GIB. ECHO was done showing an EF of 25%, patient's shock picture looked to be improving on dobutamine. Patient's Hgb dropped to 9 with 2 melanotic stool and another U pRBC was given. C-scope and EGD did not find a source of bleeding but identified a duodenal mass, biopsy was only of the mucosa surrounding the mass which was negative, but unknown pathology of the mass itself. Patient had to be intubated for these procedures, but the patient was extubated the day after and dobutmaine was weened off. It was then attempted to start the patient on ASA, plavix and heparin to treat the possible NTEMI, but the patient's Hgb then dropped and all meds were stopped. Hgb remained stable afterwards and only ASA was restarted. No diagnostic/interventional cath for the NSTEMI could be done due to patient's inability to be anticoagulated after possible stent placement. CCTA could not be performed due to inability to tolerate beta blockade. The patient is now on atorvastatin and ASA for the NSTEMI and HFrEF, lisinopril was stopped due to low MAPs and change in mental status.      INTERVAL HPI/OVERNIGHT EVENTS: No acute events overnight. This am the patient was stuporous and generally less responsive than in previous days. An ABG, lactate, Trop, CKMB and CK were all obtained and a CTH non con was ordered. The patient's O2 saturation dropped to 92 so 2L NC was placed with improvement into the high 90s. The patient's tachypnea improved slightly with the supplemental O2. Troponin came back at 1.38 (2.3 previously), lactate was negative. EKG unchanged. Patient unable to communicate any complaints.        T(C): 36.7 (02-18-20 @ 04:46), Max: 37 (02-17-20 @ 12:00)  HR: 77 (02-18-20 @ 08:00) (77 - 95)  BP: 92/51 (02-18-20 @ 07:00) (82/48 - 114/58)  RR: 24 (02-18-20 @ 08:00) (18 - 37)  SpO2: 97% (02-18-20 @ 08:00) (93% - 100%)  Wt(kg): --Vital Signs Last 24 Hrs  T(C): 36.7 (18 Feb 2020 04:46), Max: 37 (17 Feb 2020 12:00)  T(F): 98 (18 Feb 2020 04:46), Max: 98.6 (17 Feb 2020 12:00)  HR: 77 (18 Feb 2020 08:00) (77 - 95)  BP: 92/51 (18 Feb 2020 07:00) (82/48 - 114/58)  BP(mean): 68 (18 Feb 2020 07:00) (60 - 83)  RR: 24 (18 Feb 2020 08:00) (18 - 37)  SpO2: 97% (18 Feb 2020 08:00) (93% - 100%)    PHYSICAL EXAM:  GENERAL: Stupor  HEAD: Atraumatic, Normocephalic  EYES: B/L pinpoint pupils, minimally reactive to light, conjunctiva and sclera clear  ENMT: Dry MM  NECK: Supple, No JVD  NERVOUS SYSTEM:  Alert & Oriented X0, Stuporous. Following simple commands, responsive to pain x4 extremities. Unable to perform full neuro exam   CHEST/LUNG: Bibasilar crackles (improved from previous exams)  HEART: Regular rate and rhythm; No murmurs, rubs, or gallops  ABDOMEN: Soft, Nontender, Nondistended; Bowel sounds present  EXTREMITIES:  No edema x4, cool extremities x4  Vascular: 2+UE pulses, no palpable pulses in the LE B/L      Consultant(s) Notes Reviewed:  [x ] YES  [ ] NO  Care Discussed with Consultants/Other Providers [ x] YES  [ ] NO    LABS:                        9.3    11.29 )-----------( 267      ( 18 Feb 2020 05:25 )             30.2     02-18    148<H>  |  115<H>  |  27<H>  ----------------------------<  158<H>  3.9   |  21<L>  |  1.01    Ca    8.2<L>      18 Feb 2020 05:25  Phos  2.8     02-18  Mg     2.3     02-18            CAPILLARY BLOOD GLUCOSE      POCT Blood Glucose.: 144 mg/dL (18 Feb 2020 06:29)  POCT Blood Glucose.: 188 mg/dL (17 Feb 2020 21:38)  POCT Blood Glucose.: 125 mg/dL (17 Feb 2020 16:19)  POCT Blood Glucose.: 147 mg/dL (17 Feb 2020 11:22)      ABG - ( 18 Feb 2020 08:12 )  pH, Arterial: 7.54  pH, Blood: x     /  pCO2: 31    /  pO2: 96    / HCO3: 26    / Base Excess: 3.8   /  SaO2: 98                    RADIOLOGY & ADDITIONAL TESTS:    Imaging Personally Reviewed:  [ ] YES  [ ] NO    HEALTH ISSUES - PROBLEM Dx:

## 2020-02-18 NOTE — SWALLOW BEDSIDE ASSESSMENT ADULT - ASR SWALLOW ASPIRATION MONITOR
cough/throat clearing/pneumonia/upper respiratory infection/change of breathing pattern/position upright (90Y)/fever/gurgly voice/oral hygiene

## 2020-02-18 NOTE — SWALLOW BEDSIDE ASSESSMENT ADULT - SPECIFY REASON(S)
To assess swallow function in setting of change in mental status this AM (pt became stuporous w/ minimal responsiveness, per MD)

## 2020-02-18 NOTE — SWALLOW BEDSIDE ASSESSMENT ADULT - SWALLOW EVAL: RECOMMENDED FEEDING/EATING TECHNIQUES
alternate food with liquid/allow for swallow between intakes/check mouth frequently for oral residue/pocketing/***Feed only when awake, alert, attentive, energized/position upright (90 degrees)/small sips/bites/maintain upright posture during/after eating for 30 mins/no straws/oral hygiene

## 2020-02-18 NOTE — SWALLOW BEDSIDE ASSESSMENT ADULT - SWALLOW EVAL: DIAGNOSIS
Eval was limited and PO trials were not provided 2/2 hypoarousal. Recs for NPO w/ alternate means of nutrition/hydration/medication as pt continues to present w/ poor arousal and minimal responsiveness. Given tolerance of diet per RN when awake and alert, continue puree diet w/ thin liquids as tolerated only if awake, alert, attentive, energized, w/ strict aspiration precautions. This SVC will f/u to assess swallow function and safety as appropriate.

## 2020-02-18 NOTE — CHART NOTE - NSCHARTNOTEFT_GEN_A_CORE
Admitting Diagnosis:   Patient is a 87y old  Male who presents with a chief complaint of Cardiogenic shock, GIB, NSTEMI (18 Feb 2020 08:40)      PAST MEDICAL & SURGICAL HISTORY:  Dementia  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  DM2 (diabetes mellitus, type 2)  CAD, multiple vessel  H/O hernia repair  S/P CABG (coronary artery bypass graft)      Current Nutrition Order: DASH TLC Consistent Carbohydrate puree   PO Intake: Good (%) [   ]  Fair (50-75%) [   ] Poor (<25%) [   ]  GI Issues:  +BM 2/17 small soft brown, prior +BM 2/12   Pain: none per flow sheets  Skin Integrity: 1+BL ankle edema, no pressure ulcers, +IAD to perianal area     Labs:   02-18    148<H>  |  115<H>  |  27<H>  ----------------------------<  158<H>  3.9   |  21<L>  |  1.01    Ca    8.2<L>      18 Feb 2020 05:25  Phos  2.8     02-18  Mg     2.3     02-18      CAPILLARY BLOOD GLUCOSE      POCT Blood Glucose.: 151 mg/dL (18 Feb 2020 11:26)  POCT Blood Glucose.: 144 mg/dL (18 Feb 2020 06:29)  POCT Blood Glucose.: 188 mg/dL (17 Feb 2020 21:38)  POCT Blood Glucose.: 125 mg/dL (17 Feb 2020 16:19)      Medications:  MEDICATIONS  (STANDING):  albuterol/ipratropium for Nebulization. 3 milliLiter(s) Nebulizer every 6 hours  aspirin  chewable 81 milliGRAM(s) Oral daily  atorvastatin 40 milliGRAM(s) Oral at bedtime  cefTRIAXone   IVPB 1000 milliGRAM(s) IV Intermittent every 24 hours  chlorhexidine 2% Cloths 1 Application(s) Topical <User Schedule>  dextrose 5%. 1000 milliLiter(s) (50 mL/Hr) IV Continuous <Continuous>  dextrose 50% Injectable 12.5 Gram(s) IV Push once  dextrose 50% Injectable 25 Gram(s) IV Push once  dextrose 50% Injectable 25 Gram(s) IV Push once  donepezil 5 milliGRAM(s) Oral at bedtime  finasteride 5 milliGRAM(s) Oral daily  insulin lispro (HumaLOG) corrective regimen sliding scale   SubCutaneous Before meals and at bedtime  latanoprost 0.005% Ophthalmic Solution 1 Drop(s) Right EYE at bedtime  lisinopril 2.5 milliGRAM(s) Oral daily  memantine 10 milliGRAM(s) Oral two times a day  pantoprazole  Injectable 40 milliGRAM(s) IV Push every 12 hours  polyethylene glycol 3350 17 Gram(s) Oral two times a day    MEDICATIONS  (PRN):  dextrose 40% Gel 15 Gram(s) Oral once PRN Blood Glucose LESS THAN 70 milliGRAM(s)/deciliter  glucagon  Injectable 1 milliGRAM(s) IntraMuscular once PRN Glucose LESS THAN 70 milligrams/deciliter      5'0''  pounds +/-10%   (2/10) 150 pounds  (2/13) 153 pounds  (2/17) 145.9 pounds   (2/18) 137.1 pounds   %IOT=888% BMI=26.75 -- Based on most recent EMR wt   Wt has trended down since admission; CHF pt, diuretic use noted     Nutrition Focused Physical Exam: Completed [   ]  Not Pertinent [   ]  Muscle Wasting- Temporal [   ]  Clavicle/Pectoral [   ]  Shoulder/Deltoid [   ]  Scapula [   ]  Interosseous [   ]  Quadriceps [   ]  Gastrocnemius [   ]  Fat Wasting- Orbital [   ]  Buccal [   ]  Triceps [   ]  Rib [   ]  Suspect [PCM] 2/2 to physical assessment, [poor intake], and [wt loss]; please see malnutrition chart note.    IBW used to calculate energy needs due to pt's current body weight exceeding 120% of IBW, adjusted for age, CHF, fluids per team    Subjective:  86yo M, HX of CAD, hx CABG approx 10 years ago, DM2, hernia, dementia (AAOx2 at baseline). Pt with AMS PTA, presents for NSTEMI vs Type II MI, cardiogenic shock vs less likely hypovolemic shock from GIB, Acute CHF EF 25% volume overload - need for repeat ECHO noted. Pt with GIB (PTA witnessed episode of melena, stool guiac positive at [facility]), however EGD did not find source of bleeding, +duodenal mass; c-scope found no sources of bleeding with some diverticula. Pt s/p intubation in endo suite, Remains in CCU at this time, GOC 2/15 pt to be Full Code.   During time of last RD visit, reported cough during bed side dysphagia screen verbally by RN, per flow sheets passing bedside screen noted 2/10 and 2/13. Pt currently on DASH TLC consCHO puree diet 2/13.     Previous Nutrition Diagnosis: Inadequate Energy Intake Etiology rt intake<EER Signs/Symptoms aeb NPO.   Active [   ]  Resolved [x]    If resolved, new PES:     Goal:    Recommendations:    Education:     Risk Level: High [   ] Moderate [   ] Low [   ] Admitting Diagnosis:   Patient is a 87y old  Male who presents with a chief complaint of Cardiogenic shock, GIB, NSTEMI (18 Feb 2020 08:40)      PAST MEDICAL & SURGICAL HISTORY:  Dementia  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  DM2 (diabetes mellitus, type 2)  CAD, multiple vessel  H/O hernia repair  S/P CABG (coronary artery bypass graft)      Current Nutrition Order: DASH TLC Consistent Carbohydrate puree   PO Intake: Good (%) [   ]  Fair (50-75%) [   ] Poor (<25%) [ X ]  GI Issues:  +BM 2/17 small soft brown, prior +BM 2/12   Pain: none per flow sheets  Skin Integrity: 1+BL ankle edema, no pressure ulcers, +IAD to perianal area     Labs:   02-18    148<H>  |  115<H>  |  27<H>  ----------------------------<  158<H>  3.9   |  21<L>  |  1.01    Ca    8.2<L>      18 Feb 2020 05:25  Phos  2.8     02-18  Mg     2.3     02-18      CAPILLARY BLOOD GLUCOSE      POCT Blood Glucose.: 151 mg/dL (18 Feb 2020 11:26)  POCT Blood Glucose.: 144 mg/dL (18 Feb 2020 06:29)  POCT Blood Glucose.: 188 mg/dL (17 Feb 2020 21:38)  POCT Blood Glucose.: 125 mg/dL (17 Feb 2020 16:19)      Medications:  MEDICATIONS  (STANDING):  albuterol/ipratropium for Nebulization. 3 milliLiter(s) Nebulizer every 6 hours  aspirin  chewable 81 milliGRAM(s) Oral daily  atorvastatin 40 milliGRAM(s) Oral at bedtime  cefTRIAXone   IVPB 1000 milliGRAM(s) IV Intermittent every 24 hours  chlorhexidine 2% Cloths 1 Application(s) Topical <User Schedule>  dextrose 5%. 1000 milliLiter(s) (50 mL/Hr) IV Continuous <Continuous>  dextrose 50% Injectable 12.5 Gram(s) IV Push once  dextrose 50% Injectable 25 Gram(s) IV Push once  dextrose 50% Injectable 25 Gram(s) IV Push once  donepezil 5 milliGRAM(s) Oral at bedtime  finasteride 5 milliGRAM(s) Oral daily  insulin lispro (HumaLOG) corrective regimen sliding scale   SubCutaneous Before meals and at bedtime  latanoprost 0.005% Ophthalmic Solution 1 Drop(s) Right EYE at bedtime  lisinopril 2.5 milliGRAM(s) Oral daily  memantine 10 milliGRAM(s) Oral two times a day  pantoprazole  Injectable 40 milliGRAM(s) IV Push every 12 hours  polyethylene glycol 3350 17 Gram(s) Oral two times a day    MEDICATIONS  (PRN):  dextrose 40% Gel 15 Gram(s) Oral once PRN Blood Glucose LESS THAN 70 milliGRAM(s)/deciliter  glucagon  Injectable 1 milliGRAM(s) IntraMuscular once PRN Glucose LESS THAN 70 milligrams/deciliter      5'0''  pounds +/-10%   (2/10) 150 pounds  (2/13) 153 pounds  (2/17) 145.9 pounds   (2/18) 137.1 pounds   %EWB=225% BMI=26.75 -- Based on most recent EMR wt   Wt has trended down since admission; CHF pt, diuretic use noted     Nutrition Focused Physical Exam: Completed [  X -limited given pt ability to participate at this time ]  Not Pertinent [   ]  Muscle Wasting- Temporal [ Moderate ]  Clavicle/Pectoral [   ]  Shoulder/Deltoid [   ]  Scapula [   ]  Interosseous [   ]  Quadriceps [   ]  Gastrocnemius [   ]  Fat Wasting- Orbital [   ]  Buccal [   ]  Triceps [   ]  Rib [   ]      IBW used to calculate energy needs due to pt's current body weight exceeding 120% of IBW, adjusted for age, CHF, fluids per team    Subjective:  86yo M, HX of CAD, hx CABG approx 10 years ago, DM2, hernia, dementia (AAOx2 at baseline). Pt with AMS PTA, presents for NSTEMI vs Type II MI, cardiogenic shock vs less likely hypovolemic shock from GIB, Acute CHF EF 25% volume overload - need for repeat ECHO noted. Pt with GIB (PTA witnessed episode of melena, stool guiac positive at [facility]), however EGD did not find source of bleeding, +duodenal mass; c-scope found no sources of bleeding with some diverticula. Pt s/p intubation in endo suite, Remains in CCU at this time, GOC 2/15 pt to be Full Code.   During time of last RD visit, reported cough during bed side dysphagia screen verbally by RN, per flow sheets passing bedside screen noted 2/10 and 2/13. Pt currently on DASH TLC consCHO puree diet 2/13. Pt visited, soundly sleeping, 2 meal treys seen at bedside which were 0% consumed - RN unable to comment on tolerance 2/2 pt not consuming meal today / not yet giving pt Meds. Per progress note 2/18 - became stuporous with minimal responsiveness, assume reason for decreased PO intake. Noted plan for CT head if mental status does not improve throughout the day.     Previous Nutrition Diagnosis: Inadequate Energy Intake Etiology rt intake<EER Signs/Symptoms aeb NPO.   Active [   ]  Resolved [x]  If resolved, new PES: Inadequate PO intake RT decreased appetite, AMS, AEB 0% PO   Goal: Pt will meet at least 75% of nutrient needs via safe feasible route consistent with GOC     Recommendations:  1. Recommend NPO should pt be too lethargic for meals.   2. Prior to resuming diet, when pt most awake and alert would recommend swallow evaluation ordered to ensure PO diet feasible / determine most appropriate PO consistency.  >> With passing results, recommend with low sodium / Consistent Carbohydrate with evening snack diet; Glucerna x3 per day as oral nutrition supplements (220kcal/10gm prot per 1 can); PO consistency per SLP Recs. However should pt fail (vs be too lethargic for meals), and TF indicated / consistent with GOC / medically feasible, please reconsult for Recs PRN.   3. Monitor Skin, Labs, wts, GI distress, GOC.  4. RD to remain available for additional nutrition interventions as needed.     Education: NA.     Risk Level: High [ X ] Moderate [   ] Low [   ] Admitting Diagnosis:   Patient is a 87y old  Male who presents with a chief complaint of Cardiogenic shock, GIB, NSTEMI (18 Feb 2020 08:40)      PAST MEDICAL & SURGICAL HISTORY:  Dementia  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  DM2 (diabetes mellitus, type 2)  CAD, multiple vessel  H/O hernia repair  S/P CABG (coronary artery bypass graft)      Current Nutrition Order: DASH TLC Consistent Carbohydrate puree   PO Intake: Good (%) [   ]  Fair (50-75%) [   ] Poor (<25%) [ X ]  GI Issues:  +BM 2/17 small soft brown, prior +BM 2/12   Pain: none per flow sheets  Skin Integrity: 1+BL ankle edema, no pressure ulcers, +IAD to perianal area     Labs:   02-18    148<H>  |  115<H>  |  27<H>  ----------------------------<  158<H>  3.9   |  21<L>  |  1.01    Ca    8.2<L>      18 Feb 2020 05:25  Phos  2.8     02-18  Mg     2.3     02-18      CAPILLARY BLOOD GLUCOSE      POCT Blood Glucose.: 151 mg/dL (18 Feb 2020 11:26)  POCT Blood Glucose.: 144 mg/dL (18 Feb 2020 06:29)  POCT Blood Glucose.: 188 mg/dL (17 Feb 2020 21:38)  POCT Blood Glucose.: 125 mg/dL (17 Feb 2020 16:19)      Medications:  MEDICATIONS  (STANDING):  albuterol/ipratropium for Nebulization. 3 milliLiter(s) Nebulizer every 6 hours  aspirin  chewable 81 milliGRAM(s) Oral daily  atorvastatin 40 milliGRAM(s) Oral at bedtime  cefTRIAXone   IVPB 1000 milliGRAM(s) IV Intermittent every 24 hours  chlorhexidine 2% Cloths 1 Application(s) Topical <User Schedule>  dextrose 5%. 1000 milliLiter(s) (50 mL/Hr) IV Continuous <Continuous>  dextrose 50% Injectable 12.5 Gram(s) IV Push once  dextrose 50% Injectable 25 Gram(s) IV Push once  dextrose 50% Injectable 25 Gram(s) IV Push once  donepezil 5 milliGRAM(s) Oral at bedtime  finasteride 5 milliGRAM(s) Oral daily  insulin lispro (HumaLOG) corrective regimen sliding scale   SubCutaneous Before meals and at bedtime  latanoprost 0.005% Ophthalmic Solution 1 Drop(s) Right EYE at bedtime  lisinopril 2.5 milliGRAM(s) Oral daily  memantine 10 milliGRAM(s) Oral two times a day  pantoprazole  Injectable 40 milliGRAM(s) IV Push every 12 hours  polyethylene glycol 3350 17 Gram(s) Oral two times a day    MEDICATIONS  (PRN):  dextrose 40% Gel 15 Gram(s) Oral once PRN Blood Glucose LESS THAN 70 milliGRAM(s)/deciliter  glucagon  Injectable 1 milliGRAM(s) IntraMuscular once PRN Glucose LESS THAN 70 milligrams/deciliter      5'0''  pounds +/-10%   (2/10) 150 pounds  (2/13) 153 pounds  (2/17) 145.9 pounds   (2/18) 137.1 pounds   %ZZX=591% BMI=26.75 -- Based on most recent EMR wt   Wt has trended down since admission; CHF pt, diuretic use noted     Nutrition Focused Physical Exam: Completed [  X -limited given pt ability to participate at this time ]  Not Pertinent [   ]  Muscle Wasting- Temporal [ Moderate ]  Clavicle/Pectoral [   ]  Shoulder/Deltoid [   ]  Scapula [   ]  Interosseous [   ]  Quadriceps [   ]  Gastrocnemius [   ]  Fat Wasting- Orbital [   ]  Buccal [   ]  Triceps [   ]  Rib [   ]      IBW used to calculate energy needs due to pt's current body weight exceeding 120% of IBW, adjusted for age, CHF, fluids per team  1200-1440kcal/day 25-30kcal/kg  58gm/day 1.2gm/kg     Subjective:  86yo M, HX of CAD, hx CABG approx 10 years ago, DM2, hernia, dementia (AAOx2 at baseline). Pt with AMS PTA, presents for NSTEMI vs Type II MI, cardiogenic shock vs less likely hypovolemic shock from GIB, Acute CHF EF 25% volume overload - need for repeat ECHO noted. Pt with GIB (PTA witnessed episode of melena, stool guiac positive at [facility]), however EGD did not find source of bleeding, +duodenal mass; c-scope found no sources of bleeding with some diverticula. Pt s/p intubation in endo suite, Remains in CCU at this time, GOC 2/15 pt to be Full Code.   During time of last RD visit, reported cough during bed side dysphagia screen verbally by RN, per flow sheets passing bedside screen noted 2/10 and 2/13. Pt currently on DASH TLC consCHO puree diet 2/13. Pt visited, soundly sleeping, 2 meal treys seen at bedside which were 0% consumed - RN unable to comment on tolerance 2/2 pt not consuming meal today / not yet giving pt Meds. Per progress note 2/18 - became stuporous with minimal responsiveness, assume reason for decreased PO intake. Noted plan for CT head if mental status does not improve throughout the day.     Previous Nutrition Diagnosis: Inadequate Energy Intake Etiology rt intake<EER Signs/Symptoms aeb NPO.   Active [   ]  Resolved [x]  If resolved, new PES: Inadequate PO intake RT decreased appetite, AMS, AEB 0% PO   Goal: Pt will meet at least 75% of nutrient needs via safe feasible route consistent with GOC     Recommendations:  1. Recommend NPO should pt be too lethargic for meals.   2. Prior to resuming diet, when pt most awake and alert would recommend swallow evaluation ordered to ensure PO diet feasible / determine most appropriate PO consistency.  >> With passing results, recommend with low sodium / Consistent Carbohydrate with evening snack diet; Glucerna x3 per day as oral nutrition supplements (220kcal/10gm prot per 1 can); PO consistency per SLP Recs. However should pt fail (vs be too lethargic for meals), and TF indicated / consistent with GOC / medically feasible, please reconsult for Recs PRN.   3. Monitor Skin, Labs, wts, GI distress, GOC.  4. RD to remain available for additional nutrition interventions as needed.     Education: NA.     Risk Level: High [ X ] Moderate [   ] Low [   ] Admitting Diagnosis:   Patient is a 87y old  Male who presents with a chief complaint of Cardiogenic shock, GIB, NSTEMI (18 Feb 2020 08:40)      PAST MEDICAL & SURGICAL HISTORY:  Dementia  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  DM2 (diabetes mellitus, type 2)  CAD, multiple vessel  H/O hernia repair  S/P CABG (coronary artery bypass graft)      Current Nutrition Order: DASH TLC Consistent Carbohydrate puree   PO Intake: Good (%) [   ]  Fair (50-75%) [   ] Poor (<25%) [ X ]  GI Issues:  +BM 2/17 small soft brown, prior +BM 2/12   Pain: none per flow sheets  Skin Integrity: 1+BL ankle edema, no pressure ulcers, +IAD to perianal area     Labs:   02-18    148<H>  |  115<H>  |  27<H>  ----------------------------<  158<H>  3.9   |  21<L>  |  1.01    Ca    8.2<L>      18 Feb 2020 05:25  Phos  2.8     02-18  Mg     2.3     02-18      CAPILLARY BLOOD GLUCOSE      POCT Blood Glucose.: 151 mg/dL (18 Feb 2020 11:26)  POCT Blood Glucose.: 144 mg/dL (18 Feb 2020 06:29)  POCT Blood Glucose.: 188 mg/dL (17 Feb 2020 21:38)  POCT Blood Glucose.: 125 mg/dL (17 Feb 2020 16:19)      Medications:  MEDICATIONS  (STANDING):  albuterol/ipratropium for Nebulization. 3 milliLiter(s) Nebulizer every 6 hours  aspirin  chewable 81 milliGRAM(s) Oral daily  atorvastatin 40 milliGRAM(s) Oral at bedtime  cefTRIAXone   IVPB 1000 milliGRAM(s) IV Intermittent every 24 hours  chlorhexidine 2% Cloths 1 Application(s) Topical <User Schedule>  dextrose 5%. 1000 milliLiter(s) (50 mL/Hr) IV Continuous <Continuous>  dextrose 50% Injectable 12.5 Gram(s) IV Push once  dextrose 50% Injectable 25 Gram(s) IV Push once  dextrose 50% Injectable 25 Gram(s) IV Push once  donepezil 5 milliGRAM(s) Oral at bedtime  finasteride 5 milliGRAM(s) Oral daily  insulin lispro (HumaLOG) corrective regimen sliding scale   SubCutaneous Before meals and at bedtime  latanoprost 0.005% Ophthalmic Solution 1 Drop(s) Right EYE at bedtime  lisinopril 2.5 milliGRAM(s) Oral daily  memantine 10 milliGRAM(s) Oral two times a day  pantoprazole  Injectable 40 milliGRAM(s) IV Push every 12 hours  polyethylene glycol 3350 17 Gram(s) Oral two times a day    MEDICATIONS  (PRN):  dextrose 40% Gel 15 Gram(s) Oral once PRN Blood Glucose LESS THAN 70 milliGRAM(s)/deciliter  glucagon  Injectable 1 milliGRAM(s) IntraMuscular once PRN Glucose LESS THAN 70 milligrams/deciliter      5'0''  pounds +/-10%   (2/10) 150 pounds  (2/13) 153 pounds  (2/17) 145.9 pounds   (2/18) 137.1 pounds   %ZDD=660% BMI=26.75 -- Based on most recent EMR wt   Wt has trended down since admission; CHF pt, diuretic use noted     Nutrition Focused Physical Exam: Completed [  X -limited given pt ability to participate at this time ]  Not Pertinent [   ]  Muscle Wasting- Temporal [ Moderate ]  Clavicle/Pectoral [   ]  Shoulder/Deltoid [   ]  Scapula [   ]  Interosseous [   ]  Quadriceps [   ]  Gastrocnemius [   ]  Fat Wasting- Orbital [   ]  Buccal [   ]  Triceps [   ]  Rib [   ]      IBW used to calculate energy needs due to pt's current body weight exceeding 120% of IBW, adjusted for age, CHF, fluids per team  1200-1440kcal/day 25-30kcal/kg  58gm/day 1.2gm/kg     Subjective:  86yo M, HX of CAD, hx CABG approx 10 years ago, DM2, hernia, dementia (AAOx2 at baseline). Pt with AMS PTA, presents for NSTEMI vs Type II MI, cardiogenic shock vs less likely hypovolemic shock from GIB, Acute CHF EF 25% volume overload - need for repeat ECHO noted. Pt with GIB (PTA witnessed episode of melena, stool guiac positive at [facility]), however EGD did not find source of bleeding, +duodenal mass; c-scope found no sources of bleeding with some diverticula. Pt s/p intubation in endo suite, Remains in CCU at this time, GOC 2/15 pt to be Full Code.   During time of last RD visit, reported cough during bed side dysphagia screen verbally by RN, per flow sheets passing bedside screen noted 2/10 and 2/13. Pt currently on DASH TLC consCHO puree diet 2/13. Pt visited, soundly sleeping, 2 meal treys seen at bedside which were 0% consumed - RN unable to comment on tolerance 2/2 pt not consuming meal today / not yet giving pt Meds. Per progress note 2/18 - became stuporous with minimal responsiveness, assume reason for decreased PO intake. Noted plan for CT head if mental status does not improve throughout the day.   Please see below for nutritions recommendations. Recs made with team.     Previous Nutrition Diagnosis: Inadequate Energy Intake Etiology rt intake<EER Signs/Symptoms aeb NPO.   Active [   ]  Resolved [x]  If resolved, new PES: Inadequate PO intake RT decreased appetite, AMS, AEB 0% PO   Goal: Pt will meet at least 75% of nutrient needs via safe feasible route consistent with GOC     Recommendations:  1. Recommend NPO should pt be too lethargic for meals.   2. Prior to resuming diet, when pt most awake and alert would recommend swallow evaluation ordered to ensure PO diet feasible / determine most appropriate PO consistency.  >> With passing results, recommend with low sodium / Consistent Carbohydrate with evening snack diet; Glucerna x3 per day as oral nutrition supplements (220kcal/10gm prot per 1 can); PO consistency per SLP Recs. However should pt fail (vs be too lethargic for meals), and TF indicated / consistent with GOC / medically feasible, please reconsult for Recs PRN.   3. Monitor Skin, Labs, wts, GI distress, GOC.  4. RD to remain available for additional nutrition interventions as needed.     Education: NA.     Risk Level: High [ X ] Moderate [   ] Low [   ]

## 2020-02-19 LAB
ALBUMIN SERPL ELPH-MCNC: 2.8 G/DL — LOW (ref 3.3–5)
ALP SERPL-CCNC: 56 U/L — SIGNIFICANT CHANGE UP (ref 40–120)
ALT FLD-CCNC: 32 U/L — SIGNIFICANT CHANGE UP (ref 10–45)
ANION GAP SERPL CALC-SCNC: 14 MMOL/L — SIGNIFICANT CHANGE UP (ref 5–17)
AST SERPL-CCNC: 19 U/L — SIGNIFICANT CHANGE UP (ref 10–40)
BASE EXCESS BLDA CALC-SCNC: 2.9 MMOL/L — SIGNIFICANT CHANGE UP (ref -2–3)
BILIRUB SERPL-MCNC: 0.4 MG/DL — SIGNIFICANT CHANGE UP (ref 0.2–1.2)
BUN SERPL-MCNC: 24 MG/DL — HIGH (ref 7–23)
CALCIUM SERPL-MCNC: 8.5 MG/DL — SIGNIFICANT CHANGE UP (ref 8.4–10.5)
CHLORIDE SERPL-SCNC: 114 MMOL/L — HIGH (ref 96–108)
CK SERPL-CCNC: 182 U/L — SIGNIFICANT CHANGE UP (ref 30–200)
CO2 SERPL-SCNC: 21 MMOL/L — LOW (ref 22–31)
CREAT SERPL-MCNC: 0.98 MG/DL — SIGNIFICANT CHANGE UP (ref 0.5–1.3)
GAS PNL BLDA: SIGNIFICANT CHANGE UP
GLUCOSE BLDC GLUCOMTR-MCNC: 120 MG/DL — HIGH (ref 70–99)
GLUCOSE BLDC GLUCOMTR-MCNC: 132 MG/DL — HIGH (ref 70–99)
GLUCOSE BLDC GLUCOMTR-MCNC: 136 MG/DL — HIGH (ref 70–99)
GLUCOSE BLDC GLUCOMTR-MCNC: 138 MG/DL — HIGH (ref 70–99)
GLUCOSE SERPL-MCNC: 147 MG/DL — HIGH (ref 70–99)
HCO3 BLDA-SCNC: 25 MMOL/L — SIGNIFICANT CHANGE UP (ref 21–28)
HCT VFR BLD CALC: 31.4 % — LOW (ref 39–50)
HGB BLD-MCNC: 9.7 G/DL — LOW (ref 13–17)
MAGNESIUM SERPL-MCNC: 2.3 MG/DL — SIGNIFICANT CHANGE UP (ref 1.6–2.6)
MCHC RBC-ENTMCNC: 29.4 PG — SIGNIFICANT CHANGE UP (ref 27–34)
MCHC RBC-ENTMCNC: 30.9 GM/DL — LOW (ref 32–36)
MCV RBC AUTO: 95.2 FL — SIGNIFICANT CHANGE UP (ref 80–100)
NRBC # BLD: 0 /100 WBCS — SIGNIFICANT CHANGE UP (ref 0–0)
PCO2 BLDA: 29 MMHG — LOW (ref 35–48)
PH BLDA: 7.55 — HIGH (ref 7.35–7.45)
PHOSPHATE SERPL-MCNC: 3.6 MG/DL — SIGNIFICANT CHANGE UP (ref 2.5–4.5)
PLATELET # BLD AUTO: 279 K/UL — SIGNIFICANT CHANGE UP (ref 150–400)
PO2 BLDA: 108 MMHG — SIGNIFICANT CHANGE UP (ref 83–108)
POTASSIUM SERPL-MCNC: 3.8 MMOL/L — SIGNIFICANT CHANGE UP (ref 3.5–5.3)
POTASSIUM SERPL-SCNC: 3.8 MMOL/L — SIGNIFICANT CHANGE UP (ref 3.5–5.3)
PROT SERPL-MCNC: 6.2 G/DL — SIGNIFICANT CHANGE UP (ref 6–8.3)
RBC # BLD: 3.3 M/UL — LOW (ref 4.2–5.8)
RBC # FLD: 15.1 % — HIGH (ref 10.3–14.5)
SAO2 % BLDA: 98 % — SIGNIFICANT CHANGE UP (ref 95–100)
SODIUM SERPL-SCNC: 149 MMOL/L — HIGH (ref 135–145)
TROPONIN T SERPL-MCNC: 1.29 NG/ML — CRITICAL HIGH (ref 0–0.01)
WBC # BLD: 10.99 K/UL — HIGH (ref 3.8–10.5)
WBC # FLD AUTO: 10.99 K/UL — HIGH (ref 3.8–10.5)

## 2020-02-19 PROCEDURE — 71045 X-RAY EXAM CHEST 1 VIEW: CPT | Mod: 26

## 2020-02-19 PROCEDURE — 99291 CRITICAL CARE FIRST HOUR: CPT

## 2020-02-19 PROCEDURE — 93010 ELECTROCARDIOGRAM REPORT: CPT

## 2020-02-19 RX ORDER — FUROSEMIDE 40 MG
40 TABLET ORAL ONCE
Refills: 0 | Status: COMPLETED | OUTPATIENT
Start: 2020-02-19 | End: 2020-02-19

## 2020-02-19 RX ORDER — POTASSIUM CHLORIDE 20 MEQ
20 PACKET (EA) ORAL ONCE
Refills: 0 | Status: COMPLETED | OUTPATIENT
Start: 2020-02-19 | End: 2020-02-19

## 2020-02-19 RX ADMIN — PANTOPRAZOLE SODIUM 40 MILLIGRAM(S): 20 TABLET, DELAYED RELEASE ORAL at 18:30

## 2020-02-19 RX ADMIN — CHLORHEXIDINE GLUCONATE 1 APPLICATION(S): 213 SOLUTION TOPICAL at 05:37

## 2020-02-19 RX ADMIN — DONEPEZIL HYDROCHLORIDE 5 MILLIGRAM(S): 10 TABLET, FILM COATED ORAL at 21:02

## 2020-02-19 RX ADMIN — Medication 50 MILLIEQUIVALENT(S): at 06:18

## 2020-02-19 RX ADMIN — MEMANTINE HYDROCHLORIDE 10 MILLIGRAM(S): 10 TABLET ORAL at 05:37

## 2020-02-19 RX ADMIN — PANTOPRAZOLE SODIUM 40 MILLIGRAM(S): 20 TABLET, DELAYED RELEASE ORAL at 05:37

## 2020-02-19 RX ADMIN — Medication 3 MILLILITER(S): at 10:39

## 2020-02-19 RX ADMIN — ATORVASTATIN CALCIUM 40 MILLIGRAM(S): 80 TABLET, FILM COATED ORAL at 21:02

## 2020-02-19 RX ADMIN — Medication 81 MILLIGRAM(S): at 11:16

## 2020-02-19 RX ADMIN — Medication 40 MILLIGRAM(S): at 11:14

## 2020-02-19 RX ADMIN — Medication 3 MILLILITER(S): at 22:36

## 2020-02-19 RX ADMIN — MEMANTINE HYDROCHLORIDE 10 MILLIGRAM(S): 10 TABLET ORAL at 18:31

## 2020-02-19 RX ADMIN — LATANOPROST 1 DROP(S): 0.05 SOLUTION/ DROPS OPHTHALMIC; TOPICAL at 21:53

## 2020-02-19 RX ADMIN — FINASTERIDE 5 MILLIGRAM(S): 5 TABLET, FILM COATED ORAL at 11:16

## 2020-02-19 RX ADMIN — Medication 3 MILLILITER(S): at 05:32

## 2020-02-19 NOTE — PROGRESS NOTE ADULT - ASSESSMENT
87M  PMH CAD s/p CABG, HTN, HLD, DM, advanced dementia presenting with NSTEMI c/b cardiogenic shock and GI bleed, admitted to CCU for further monitoring and management.    CARDIOVASCULAR:  #CAD; known history of CAD s/p CABG (2003) with 2/3 grafts occluded as of 2009; poa with ACS vs Type II MI in setting of GI Bleed; troponins trended to peak.   Given significant GI bleed and unclear source despite EGD/C-scope, plan for conservative management of CAD as below:  -ASA 81mg qd  -Lipitor 40mg qhs    #HFrEF; EF 25% w/ global hypokinesis   Lung exam with continued bibasilar crackles; LE 1+ edema to midshin  am CXR with continued congestion with some improvement from yesterday;  -Lasix 40mg IV x 1, consider further diuresis in evening  -strict I/O's, daily weights; monitor clinical volume status  -holding lisinopril  -will consider addition of beta-blocker tomorrow    PULM:  #Pleural effusions 2/2 HFrEF +/- less likely PNA  am CXR with continued congestion with some improvement from yesterday  -continue diuresis as above  -wean from HFNC to 5-6L NC; repeat ABG this evening on NC  -completed ABX yesterday    GI:  #GI Bleed - s/p EGD/C-scope without confirmed source of bleed; Hgb remains stable; currently tolerating Aspirin 81mg qd  -pantoprazole 40mg IVP q12h  -consider transition to oral PPI  -monitor CBC  -maintain peripheral access    RENAL  #Hypernatremia   -continue to monitor  -avoiding IVF in setting of fluid overload as above  -encourage PO intake    ID:  #CAP; resolved, currently afebrile, no cough  -s/p ABX treatment for presumed CAP    NEURO  #Dementia; baseline AAOx1-2; patient appears to be mentating near baseline  -continue home medications donepezil and memantine    ENDOCRINE:  #DMII; A1C 6.7 on admission; home regimen includes metformin 500mg bid   -mISS    F: none  E: replete prn  N: DASH/CC diet, pureed  DVT PPx: SCD's  PT: OOB to chair / PT eval    Dispo: CCU  Code status: DNR/DNI; patient with overall poor prognosis

## 2020-02-19 NOTE — PHYSICAL THERAPY INITIAL EVALUATION ADULT - MODALITIES TREATMENT COMMENTS
HEP- seated AROM L long arc quads, seated AAROM RLE long arc quads, ankle pumps, supine AAROM BLE heel slides, supine AAROM SLR

## 2020-02-19 NOTE — PHYSICAL THERAPY INITIAL EVALUATION ADULT - GENERAL OBSERVATIONS, REHAB EVAL
Received supine, denies pain +EKG, O2 NC 2L, a-line, TLC, IV, B z-flex, primafit. Left as found +lines intact, RN Parris aware, call garcia

## 2020-02-19 NOTE — PHYSICAL THERAPY INITIAL EVALUATION ADULT - CRITERIA FOR SKILLED THERAPEUTIC INTERVENTIONS
rehab potential/therapy frequency/functional limitations in following categories/anticipated discharge recommendation/impairments found

## 2020-02-19 NOTE — PHYSICAL THERAPY INITIAL EVALUATION ADULT - LEVEL OF INDEPENDENCE: SUPINE/SIT, REHAB EVAL
kishan at EOB x 10 min with CGA to perform therapeutic exercise, complaints of dizziness however VSS, head sidebent to R, lethargic, eyes closing ~50% time/maximum assist (25% patients effort)

## 2020-02-19 NOTE — PHYSICAL THERAPY INITIAL EVALUATION ADULT - ADDITIONAL COMMENTS
from Care Coordination Assessment and patient's daughter; patient lives in multilevel house, 4-5 steps to enter house, patient resides on first floor

## 2020-02-19 NOTE — PHYSICAL THERAPY INITIAL EVALUATION ADULT - PERTINENT HX OF CURRENT PROBLEM, REHAB EVAL
87M on 2/9, pt was at home and became more confused HHA concerned for AMS, confusion at which point HHA called pt's wife. Upon arrival to home, wife determined patient was in fact more confused than his baseline and called 911. EMS arrived and brought pt to [facility]. 87M on 2/9, pt was at home and became more confused HHA concerned for AMS, confusion at which point HHA called pt's wife. EMS was called,  and brought pt to [facility]. Complaint of Cardiogenic shock, GIB, NSTEMI

## 2020-02-19 NOTE — PROGRESS NOTE ADULT - SUBJECTIVE AND OBJECTIVE BOX
note in progress    OVERNIGHT EVENTS: No acute events overnight.  SUBJECTIVE: Patient seen and examined at the bedside.   12-point ROS reviewed and is otherwise negative.    Vital Signs Last 12 Hrs  T(F): 98.8 (02-19-20 @ 04:20), Max: 98.9 (02-18-20 @ 22:00)  HR: 63 (02-19-20 @ 09:00) (63 - 89)  BP: 107/55 (02-19-20 @ 00:00) (93/55 - 107/55)  BP(mean): 74 (02-19-20 @ 00:00) (70 - 74)  RR: 17 (02-19-20 @ 09:00) (17 - 26)  SpO2: 100% (02-19-20 @ 09:00) (94% - 100%)    I&O's Summary    18 Feb 2020 07:01  -  19 Feb 2020 07:00  --------------------------------------------------------  IN: 150 mL / OUT: 1400 mL / NET: -1250 mL      PHYSICAL EXAM:  General: NAD, resting comfortably in bed  HEENT: NCAT, PERRL, EOMI, MMM  Neck: no JVD  Respiratory: CTA b/l, no WRR  Cardiovascular: normal S1/S2, RRR, no MRG  Vascular: 2+ radial/DP pulses b/l  Abdomen: +BS x4 quadrants, soft, NTND  Extremities: WWP, no clubbing, no cyanosis, no edema  Skin: no gross skin abnormalities or rashes noted  Neuro: AAOx3, no focal neurological deficits    LABS:                        9.7    10.99 )-----------( 279      ( 19 Feb 2020 05:00 )             31.4     02-19    149<H>  |  114<H>  |  24<H>  ----------------------------<  147<H>  3.8   |  21<L>  |  0.98    Ca    8.5      19 Feb 2020 05:00  Phos  3.6     02-19  Mg     2.3     02-19    TPro  6.2  /  Alb  2.8<L>  /  TBili  0.4  /  DBili  x   /  AST  19  /  ALT  32  /  AlkPhos  56  02-19    RADIOLOGY & ADDITIONAL TESTS: Reviewed    MEDICATIONS  (STANDING):  albuterol/ipratropium for Nebulization. 3 milliLiter(s) Nebulizer every 6 hours  aspirin  chewable 81 milliGRAM(s) Oral daily  atorvastatin 40 milliGRAM(s) Oral at bedtime  chlorhexidine 2% Cloths 1 Application(s) Topical <User Schedule>  dextrose 5%. 1000 milliLiter(s) (50 mL/Hr) IV Continuous <Continuous>  dextrose 50% Injectable 12.5 Gram(s) IV Push once  dextrose 50% Injectable 25 Gram(s) IV Push once  dextrose 50% Injectable 25 Gram(s) IV Push once  donepezil 5 milliGRAM(s) Oral at bedtime  finasteride 5 milliGRAM(s) Oral daily  insulin lispro (HumaLOG) corrective regimen sliding scale   SubCutaneous Before meals and at bedtime  latanoprost 0.005% Ophthalmic Solution 1 Drop(s) Right EYE at bedtime  memantine 10 milliGRAM(s) Oral two times a day  pantoprazole  Injectable 40 milliGRAM(s) IV Push every 12 hours  polyethylene glycol 3350 17 Gram(s) Oral two times a day    MEDICATIONS  (PRN):  dextrose 40% Gel 15 Gram(s) Oral once PRN Blood Glucose LESS THAN 70 milliGRAM(s)/deciliter  glucagon  Injectable 1 milliGRAM(s) IntraMuscular once PRN Glucose LESS THAN 70 milligrams/deciliter OVERNIGHT EVENTS: After extensive discussion with patient's family, the decision was made to pursue code status DNR/DNI. ABG consistent with respiratory alkalosis and hypoxemia for which Lasix 40mg IV given and started HFNC.  SUBJECTIVE: Patient seen and examined at the bedside. Patient is oriented to self; appears to be breathing comfortably on HFNC. Follows simple commands. ROS limited.    Vital Signs Last 12 Hrs  T(F): 98.8 (02-19-20 @ 04:20), Max: 98.9 (02-18-20 @ 22:00)  HR: 63 (02-19-20 @ 09:00) (63 - 89)  BP: 107/55 (02-19-20 @ 00:00) (93/55 - 107/55)  BP(mean): 74 (02-19-20 @ 00:00) (70 - 74)  RR: 17 (02-19-20 @ 09:00) (17 - 26)  SpO2: 100% (02-19-20 @ 09:00) (94% - 100%)    I&O's Summary  18 Feb 2020 07:01  -  19 Feb 2020 07:00  --------------------------------------------------------  IN: 150 mL / OUT: 1400 mL / NET: -1250 mL    PHYSICAL EXAM:  General: NAD, AAOx1 to self, follows simple commands resting comfortably in bed with HFNC in place  HEENT: NCAT, PERRL, MMM  Neck: no JVD  Respiratory: bibasilar crackles, otherwise CTA, normal respiratory effort  Cardiovascular: normal S1/S2, RRR, no MRG  Vascular: 2+ radial/DP pulses b/l  Abdomen: +BS x4 quadrants, soft, NTND  Extremities: cool to touch, no clubbing, no cyanosis, 1+ pitting edema to mid-shin b/l  Skin: no gross skin abnormalities or rashes noted    LABS:                        9.7    10.99 )-----------( 279      ( 19 Feb 2020 05:00 )             31.4     02-19    149<H>  |  114<H>  |  24<H>  ----------------------------<  147<H>  3.8   |  21<L>  |  0.98    Ca    8.5      19 Feb 2020 05:00  Phos  3.6     02-19  Mg     2.3     02-19    TPro  6.2  /  Alb  2.8<L>  /  TBili  0.4  /  DBili  x   /  AST  19  /  ALT  32  /  AlkPhos  56  02-19    RADIOLOGY & ADDITIONAL TESTS: Reviewed    MEDICATIONS  (STANDING):  albuterol/ipratropium for Nebulization. 3 milliLiter(s) Nebulizer every 6 hours  aspirin  chewable 81 milliGRAM(s) Oral daily  atorvastatin 40 milliGRAM(s) Oral at bedtime  chlorhexidine 2% Cloths 1 Application(s) Topical <User Schedule>  dextrose 5%. 1000 milliLiter(s) (50 mL/Hr) IV Continuous <Continuous>  dextrose 50% Injectable 12.5 Gram(s) IV Push once  dextrose 50% Injectable 25 Gram(s) IV Push once  dextrose 50% Injectable 25 Gram(s) IV Push once  donepezil 5 milliGRAM(s) Oral at bedtime  finasteride 5 milliGRAM(s) Oral daily  insulin lispro (HumaLOG) corrective regimen sliding scale   SubCutaneous Before meals and at bedtime  latanoprost 0.005% Ophthalmic Solution 1 Drop(s) Right EYE at bedtime  memantine 10 milliGRAM(s) Oral two times a day  pantoprazole  Injectable 40 milliGRAM(s) IV Push every 12 hours  polyethylene glycol 3350 17 Gram(s) Oral two times a day    MEDICATIONS  (PRN):  dextrose 40% Gel 15 Gram(s) Oral once PRN Blood Glucose LESS THAN 70 milliGRAM(s)/deciliter  glucagon  Injectable 1 milliGRAM(s) IntraMuscular once PRN Glucose LESS THAN 70 milligrams/deciliter

## 2020-02-20 LAB
ALBUMIN SERPL ELPH-MCNC: 2.8 G/DL — LOW (ref 3.3–5)
ALP SERPL-CCNC: 57 U/L — SIGNIFICANT CHANGE UP (ref 40–120)
ALT FLD-CCNC: 25 U/L — SIGNIFICANT CHANGE UP (ref 10–45)
ANION GAP SERPL CALC-SCNC: 12 MMOL/L — SIGNIFICANT CHANGE UP (ref 5–17)
ANION GAP SERPL CALC-SCNC: 13 MMOL/L — SIGNIFICANT CHANGE UP (ref 5–17)
ANION GAP SERPL CALC-SCNC: 14 MMOL/L — SIGNIFICANT CHANGE UP (ref 5–17)
AST SERPL-CCNC: 16 U/L — SIGNIFICANT CHANGE UP (ref 10–40)
BILIRUB SERPL-MCNC: 0.5 MG/DL — SIGNIFICANT CHANGE UP (ref 0.2–1.2)
BUN SERPL-MCNC: 26 MG/DL — HIGH (ref 7–23)
BUN SERPL-MCNC: 32 MG/DL — HIGH (ref 7–23)
BUN SERPL-MCNC: 34 MG/DL — HIGH (ref 7–23)
CALCIUM SERPL-MCNC: 8.6 MG/DL — SIGNIFICANT CHANGE UP (ref 8.4–10.5)
CALCIUM SERPL-MCNC: 9.1 MG/DL — SIGNIFICANT CHANGE UP (ref 8.4–10.5)
CALCIUM SERPL-MCNC: 9.1 MG/DL — SIGNIFICANT CHANGE UP (ref 8.4–10.5)
CHLORIDE SERPL-SCNC: 115 MMOL/L — HIGH (ref 96–108)
CHLORIDE SERPL-SCNC: 115 MMOL/L — HIGH (ref 96–108)
CHLORIDE SERPL-SCNC: 118 MMOL/L — HIGH (ref 96–108)
CO2 SERPL-SCNC: 24 MMOL/L — SIGNIFICANT CHANGE UP (ref 22–31)
CREAT SERPL-MCNC: 0.94 MG/DL — SIGNIFICANT CHANGE UP (ref 0.5–1.3)
CREAT SERPL-MCNC: 0.98 MG/DL — SIGNIFICANT CHANGE UP (ref 0.5–1.3)
CREAT SERPL-MCNC: 1.05 MG/DL — SIGNIFICANT CHANGE UP (ref 0.5–1.3)
CULTURE RESULTS: SIGNIFICANT CHANGE UP
CULTURE RESULTS: SIGNIFICANT CHANGE UP
GLUCOSE BLDC GLUCOMTR-MCNC: 126 MG/DL — HIGH (ref 70–99)
GLUCOSE BLDC GLUCOMTR-MCNC: 158 MG/DL — HIGH (ref 70–99)
GLUCOSE BLDC GLUCOMTR-MCNC: 165 MG/DL — HIGH (ref 70–99)
GLUCOSE BLDC GLUCOMTR-MCNC: 203 MG/DL — HIGH (ref 70–99)
GLUCOSE SERPL-MCNC: 142 MG/DL — HIGH (ref 70–99)
GLUCOSE SERPL-MCNC: 161 MG/DL — HIGH (ref 70–99)
GLUCOSE SERPL-MCNC: 184 MG/DL — HIGH (ref 70–99)
HCT VFR BLD CALC: 33.9 % — LOW (ref 39–50)
HGB BLD-MCNC: 9.9 G/DL — LOW (ref 13–17)
MAGNESIUM SERPL-MCNC: 2.4 MG/DL — SIGNIFICANT CHANGE UP (ref 1.6–2.6)
MCHC RBC-ENTMCNC: 28.7 PG — SIGNIFICANT CHANGE UP (ref 27–34)
MCHC RBC-ENTMCNC: 29.2 GM/DL — LOW (ref 32–36)
MCV RBC AUTO: 98.3 FL — SIGNIFICANT CHANGE UP (ref 80–100)
NRBC # BLD: 0 /100 WBCS — SIGNIFICANT CHANGE UP (ref 0–0)
PHOSPHATE SERPL-MCNC: 3.5 MG/DL — SIGNIFICANT CHANGE UP (ref 2.5–4.5)
PLATELET # BLD AUTO: 302 K/UL — SIGNIFICANT CHANGE UP (ref 150–400)
POTASSIUM SERPL-MCNC: 3.7 MMOL/L — SIGNIFICANT CHANGE UP (ref 3.5–5.3)
POTASSIUM SERPL-MCNC: 3.7 MMOL/L — SIGNIFICANT CHANGE UP (ref 3.5–5.3)
POTASSIUM SERPL-MCNC: 4.1 MMOL/L — SIGNIFICANT CHANGE UP (ref 3.5–5.3)
POTASSIUM SERPL-SCNC: 3.7 MMOL/L — SIGNIFICANT CHANGE UP (ref 3.5–5.3)
POTASSIUM SERPL-SCNC: 3.7 MMOL/L — SIGNIFICANT CHANGE UP (ref 3.5–5.3)
POTASSIUM SERPL-SCNC: 4.1 MMOL/L — SIGNIFICANT CHANGE UP (ref 3.5–5.3)
PROT SERPL-MCNC: 6.5 G/DL — SIGNIFICANT CHANGE UP (ref 6–8.3)
RBC # BLD: 3.45 M/UL — LOW (ref 4.2–5.8)
RBC # FLD: 14.9 % — HIGH (ref 10.3–14.5)
SODIUM SERPL-SCNC: 151 MMOL/L — HIGH (ref 135–145)
SODIUM SERPL-SCNC: 152 MMOL/L — HIGH (ref 135–145)
SODIUM SERPL-SCNC: 156 MMOL/L — HIGH (ref 135–145)
SPECIMEN SOURCE: SIGNIFICANT CHANGE UP
SPECIMEN SOURCE: SIGNIFICANT CHANGE UP
WBC # BLD: 10.81 K/UL — HIGH (ref 3.8–10.5)
WBC # FLD AUTO: 10.81 K/UL — HIGH (ref 3.8–10.5)

## 2020-02-20 PROCEDURE — 71045 X-RAY EXAM CHEST 1 VIEW: CPT | Mod: 26

## 2020-02-20 PROCEDURE — 99291 CRITICAL CARE FIRST HOUR: CPT

## 2020-02-20 RX ORDER — PANTOPRAZOLE SODIUM 20 MG/1
40 TABLET, DELAYED RELEASE ORAL EVERY 12 HOURS
Refills: 0 | Status: DISCONTINUED | OUTPATIENT
Start: 2020-02-20 | End: 2020-02-23

## 2020-02-20 RX ORDER — FUROSEMIDE 40 MG
20 TABLET ORAL ONCE
Refills: 0 | Status: COMPLETED | OUTPATIENT
Start: 2020-02-20 | End: 2020-02-20

## 2020-02-20 RX ORDER — FUROSEMIDE 40 MG
40 TABLET ORAL ONCE
Refills: 0 | Status: COMPLETED | OUTPATIENT
Start: 2020-02-20 | End: 2020-02-20

## 2020-02-20 RX ORDER — SODIUM CHLORIDE 9 MG/ML
250 INJECTION, SOLUTION INTRAVENOUS
Refills: 0 | Status: COMPLETED | OUTPATIENT
Start: 2020-02-20 | End: 2020-02-21

## 2020-02-20 RX ADMIN — Medication 40 MILLIGRAM(S): at 09:57

## 2020-02-20 RX ADMIN — POLYETHYLENE GLYCOL 3350 17 GRAM(S): 17 POWDER, FOR SOLUTION ORAL at 06:07

## 2020-02-20 RX ADMIN — Medication 20 MILLIGRAM(S): at 16:49

## 2020-02-20 RX ADMIN — Medication 81 MILLIGRAM(S): at 10:51

## 2020-02-20 RX ADMIN — FINASTERIDE 5 MILLIGRAM(S): 5 TABLET, FILM COATED ORAL at 10:51

## 2020-02-20 RX ADMIN — PANTOPRAZOLE SODIUM 40 MILLIGRAM(S): 20 TABLET, DELAYED RELEASE ORAL at 06:07

## 2020-02-20 RX ADMIN — Medication 3 MILLILITER(S): at 15:33

## 2020-02-20 RX ADMIN — POLYETHYLENE GLYCOL 3350 17 GRAM(S): 17 POWDER, FOR SOLUTION ORAL at 17:33

## 2020-02-20 RX ADMIN — LATANOPROST 1 DROP(S): 0.05 SOLUTION/ DROPS OPHTHALMIC; TOPICAL at 22:24

## 2020-02-20 RX ADMIN — Medication 2: at 22:24

## 2020-02-20 RX ADMIN — Medication 3 MILLILITER(S): at 20:33

## 2020-02-20 RX ADMIN — Medication 4: at 11:21

## 2020-02-20 RX ADMIN — MEMANTINE HYDROCHLORIDE 10 MILLIGRAM(S): 10 TABLET ORAL at 17:33

## 2020-02-20 RX ADMIN — PANTOPRAZOLE SODIUM 40 MILLIGRAM(S): 20 TABLET, DELAYED RELEASE ORAL at 17:33

## 2020-02-20 RX ADMIN — MEMANTINE HYDROCHLORIDE 10 MILLIGRAM(S): 10 TABLET ORAL at 06:07

## 2020-02-20 RX ADMIN — CHLORHEXIDINE GLUCONATE 1 APPLICATION(S): 213 SOLUTION TOPICAL at 06:07

## 2020-02-20 RX ADMIN — Medication 2: at 18:33

## 2020-02-20 RX ADMIN — Medication 3 MILLILITER(S): at 09:39

## 2020-02-20 NOTE — PROGRESS NOTE ADULT - SUBJECTIVE AND OBJECTIVE BOX
note in progress    OVERNIGHT EVENTS: No acute events overnight.  SUBJECTIVE: Patient seen and examined at the bedside.   12-point ROS reviewed and is otherwise negative.    Vital Signs Last 12 Hrs  T(F): 97.7 (02-20-20 @ 04:45), Max: 99.3 (02-19-20 @ 21:38)  HR: 76 (02-20-20 @ 08:00) (59 - 80)  BP: 108/59 (02-20-20 @ 07:00) (100/55 - 113/58)  BP(mean): 81 (02-20-20 @ 07:00) (73 - 82)  RR: 24 (02-20-20 @ 08:00) (14 - 38)  SpO2: 98% (02-20-20 @ 08:00) (96% - 99%)    I&O's Summary    19 Feb 2020 07:01  -  20 Feb 2020 07:00  --------------------------------------------------------  IN: 0 mL / OUT: 1250 mL / NET: -1250 mL    PHYSICAL EXAM:  General: NAD, resting comfortably in bed  HEENT: NCAT, PERRL, EOMI, MMM  Neck: no JVD  Respiratory: CTA b/l, no WRR  Cardiovascular: normal S1/S2, RRR, no MRG  Vascular: 2+ radial/DP pulses b/l  Abdomen: +BS x4 quadrants, soft, NTND  Extremities: WWP, no clubbing, no cyanosis, no edema  Skin: no gross skin abnormalities or rashes noted  Neuro: AAOx3, no focal neurological deficits    LABS:                        9.9    10.81 )-----------( 302      ( 20 Feb 2020 05:34 )             33.9     02-20    151<H>  |  115<H>  |  26<H>  ----------------------------<  142<H>  4.1   |  24  |  0.94    Ca    8.6      20 Feb 2020 05:34  Phos  3.5     02-20  Mg     2.4     02-20    TPro  6.5  /  Alb  2.8<L>  /  TBili  0.5  /  DBili  x   /  AST  16  /  ALT  25  /  AlkPhos  57  02-20    RADIOLOGY & ADDITIONAL TESTS: Reviewed    MEDICATIONS  (STANDING):  albuterol/ipratropium for Nebulization. 3 milliLiter(s) Nebulizer every 6 hours  aspirin  chewable 81 milliGRAM(s) Oral daily  atorvastatin 40 milliGRAM(s) Oral at bedtime  chlorhexidine 2% Cloths 1 Application(s) Topical <User Schedule>  dextrose 5%. 1000 milliLiter(s) (50 mL/Hr) IV Continuous <Continuous>  dextrose 50% Injectable 12.5 Gram(s) IV Push once  dextrose 50% Injectable 25 Gram(s) IV Push once  dextrose 50% Injectable 25 Gram(s) IV Push once  donepezil 5 milliGRAM(s) Oral at bedtime  finasteride 5 milliGRAM(s) Oral daily  insulin lispro (HumaLOG) corrective regimen sliding scale   SubCutaneous Before meals and at bedtime  latanoprost 0.005% Ophthalmic Solution 1 Drop(s) Right EYE at bedtime  memantine 10 milliGRAM(s) Oral two times a day  pantoprazole    Tablet 40 milliGRAM(s) Oral every 12 hours  polyethylene glycol 3350 17 Gram(s) Oral two times a day    MEDICATIONS  (PRN):  dextrose 40% Gel 15 Gram(s) Oral once PRN Blood Glucose LESS THAN 70 milliGRAM(s)/deciliter  glucagon  Injectable 1 milliGRAM(s) IntraMuscular once PRN Glucose LESS THAN 70 milligrams/deciliter OVERNIGHT EVENTS: No acute events overnight. Patient remained on 2LNC throughout.  SUBJECTIVE: Patient seen and examined at the bedside. Patient appears comfortable resting in bed; Responds appropriately to yes/no questioning; AAOx1-2 to self and general place (denies being at home; says yes when asked if in hospital). Patient ate a full plate of breakfast this morning.     Vital Signs Last 12 Hrs  T(F): 97.7 (02-20-20 @ 04:45), Max: 99.3 (02-19-20 @ 21:38)  HR: 76 (02-20-20 @ 08:00) (59 - 80)  BP: 108/59 (02-20-20 @ 07:00) (100/55 - 113/58)  BP(mean): 81 (02-20-20 @ 07:00) (73 - 82)  RR: 24 (02-20-20 @ 08:00) (14 - 38)  SpO2: 98% (02-20-20 @ 08:00) (96% - 99%)    I&O's Summary  19 Feb 2020 07:01  -  20 Feb 2020 07:00  --------------------------------------------------------  IN: 0 mL / OUT: 1250 mL / NET: -1250 mL    PHYSICAL EXAM:  General: NAD, AAOx1-2 as above, follows simple commands, resting comfortably in bed with HFNC in place  HEENT: NCAT, PERRL, MMM  Neck: no JVD  Respiratory: CTA b/l normal respiratory effort  Cardiovascular: normal S1/S2, RRR, no MRG  Vascular: 2+ radial/DP pulses b/l  Abdomen: +BS x4 quadrants, soft, NTND  Extremities: cool to touch, no clubbing, no cyanosis, no edema  Skin: no gross skin abnormalities or rashes noted    LABS:                        9.9    10.81 )-----------( 302      ( 20 Feb 2020 05:34 )             33.9     02-20    151<H>  |  115<H>  |  26<H>  ----------------------------<  142<H>  4.1   |  24  |  0.94    Ca    8.6      20 Feb 2020 05:34  Phos  3.5     02-20  Mg     2.4     02-20    TPro  6.5  /  Alb  2.8<L>  /  TBili  0.5  /  DBili  x   /  AST  16  /  ALT  25  /  AlkPhos  57  02-20    RADIOLOGY & ADDITIONAL TESTS: Reviewed    MEDICATIONS  (STANDING):  albuterol/ipratropium for Nebulization. 3 milliLiter(s) Nebulizer every 6 hours  aspirin  chewable 81 milliGRAM(s) Oral daily  atorvastatin 40 milliGRAM(s) Oral at bedtime  chlorhexidine 2% Cloths 1 Application(s) Topical <User Schedule>  dextrose 5%. 1000 milliLiter(s) (50 mL/Hr) IV Continuous <Continuous>  dextrose 50% Injectable 12.5 Gram(s) IV Push once  dextrose 50% Injectable 25 Gram(s) IV Push once  dextrose 50% Injectable 25 Gram(s) IV Push once  donepezil 5 milliGRAM(s) Oral at bedtime  finasteride 5 milliGRAM(s) Oral daily  insulin lispro (HumaLOG) corrective regimen sliding scale   SubCutaneous Before meals and at bedtime  latanoprost 0.005% Ophthalmic Solution 1 Drop(s) Right EYE at bedtime  memantine 10 milliGRAM(s) Oral two times a day  pantoprazole    Tablet 40 milliGRAM(s) Oral every 12 hours  polyethylene glycol 3350 17 Gram(s) Oral two times a day    MEDICATIONS  (PRN):  dextrose 40% Gel 15 Gram(s) Oral once PRN Blood Glucose LESS THAN 70 milliGRAM(s)/deciliter  glucagon  Injectable 1 milliGRAM(s) IntraMuscular once PRN Glucose LESS THAN 70 milligrams/deciliter

## 2020-02-20 NOTE — PROGRESS NOTE ADULT - ASSESSMENT
87M  PMH CAD s/p CABG, HTN, HLD, DM, advanced dementia presenting with NSTEMI c/b cardiogenic shock and GI bleed, admitted to CCU for further monitoring and management.    CARDIOVASCULAR:  #CAD; known history of CAD s/p CABG (2003) with 2/3 grafts occluded as of 2009; poa with ACS vs Type II MI in setting of GI Bleed; troponins trended to peak.   Given significant GI bleed and unclear source despite EGD/C-scope, plan for conservative management of CAD as below:  -ASA 81mg qd  -Lipitor 40mg qhs    #HFrEF; EF 25% w/ global hypokinesis   lungs CTA b/l, no LE edema  am CXR with continued congestion +/- improvement from yesterday  -Lasix 40mg IV x 1 this morning  -3PM BMP   -consider further diuresis in evening  -strict I/O's, daily weights; monitor clinical volume status; daily CXR  -holding lisinopril  -will consider addition of beta-blocker pending further assessment    PULM:  #Pleural effusions 2/2 HFrEF  am CXR with marked clinical improvement from yesterday  -plan for Lasix 40mg IV x1 this morning; f/u 3pm BMP; will consider further diuresis this evening  -2LNC, wean as tolerated  -daily CXR    GI:  #GI Bleed - s/p EGD/C-scope without confirmed source of bleed; Hgb remains stable  currently tolerating Aspirin 81mg qd   -pantoprazole 40mg bid transitioned from iv to po  -monitor CBC  -maintain peripheral access    RENAL  #Hypernatremia   -continue to monitor in setting of continued diuresis  -3PM BMP - goal Na <154  -avoiding IVF in setting of fluid overload as above  -encourage PO fluid intake    NEURO  #Dementia; baseline AAOx1-2; patient appears to be mentating near baseline; waxes and wanes  -continue home medications donepezil and memantine    ENDOCRINE:  #DMII; A1C 6.7 on admission; home regimen includes metformin 500mg bid   -mISS    F: none; encourage PO intake  E: replete prn  N: pureed DASH/CC diet; aspiration precautions  DVT PPx: SCDs  PT: OOB to chair / initial PT eval: VALERIE    Dispo: CCU  Code status: DNR/DNI; patient with overall poor prognosis 87M  PMH CAD s/p CABG, HTN, HLD, DM, advanced dementia presenting with NSTEMI c/b cardiogenic shock and GI bleed, admitted to CCU for further monitoring and management.    CARDIOVASCULAR:  #CAD; known history of CAD s/p CABG (2003) with 2/3 grafts occluded as of 2009; poa with ACS vs Type II MI in setting of GI Bleed; troponins trended to peak.   Given significant GI bleed and unclear source despite EGD/C-scope, plan for conservative management of CAD as below:  -ASA 81mg qd  -Lipitor 40mg qhs    #HFrEF; EF 25% w/ global hypokinesis   lungs CTA b/l, no LE edema  am CXR with continued congestion +/- improvement from yesterday  -Lasix 40mg IV x 1 this morning  -3PM BMP   -consider further diuresis in evening  -strict I/O's, daily weights; monitor clinical volume status; daily CXR  -holding lisinopril  -will consider addition of beta-blocker pending further assessment    PULM:  #Pleural effusions 2/2 HFrEF  am CXR with marked clinical improvement from yesterday  -plan for Lasix 40mg IV x1 this morning; f/u 3pm BMP; will consider further diuresis this evening  -2LNC, wean as tolerated  -daily CXR    GI:  #GI Bleed - s/p EGD/C-scope without confirmed source of bleed; Hgb remains stable  currently tolerating Aspirin 81mg qd   -pantoprazole 40mg bid transitioned from iv to po  -monitor CBC  -maintain peripheral access    RENAL  #Hypernatremia   -continue to monitor in setting of continued diuresis  -3PM BMP - goal Na <154  -avoiding IVF in setting of fluid overload as above  -encourage PO fluid intake    NEURO  #Dementia; baseline AAOx1-2; patient appears to be mentating near baseline; waxes and wanes  -continue home medications donepezil and memantine  -aspiration precautions; f/u repeat S/S eval    ENDOCRINE:  #DMII; A1C 6.7 on admission; home regimen includes metformin 500mg bid   -mISS    F: none; encourage PO intake  E: replete prn  N: pureed DASH/CC diet; aspiration precautions  DVT PPx: SCDs  PT: OOB to chair / initial PT eval: VALERIE    Dispo: CCU  Code status: DNR/DNI; patient with overall poor prognosis 87M  PMH CAD s/p CABG, HTN, HLD, DM, advanced dementia presenting with GI bleed and NSTEMI c/b cardiogenic shock, admitted to CCU for further monitoring and management.    CARDIOVASCULAR:  #CAD; known history of CAD s/p CABG (2003) with 2/3 grafts occluded as of 2009; poa with ACS vs Type II MI in setting of GI Bleed; troponins trended to peak.   Given significant GI bleed and unclear source despite EGD/C-scope, plan for conservative management of CAD as below:  -ASA 81mg qd  -Lipitor 40mg qhs    #HFrEF; EF 25% w/ global hypokinesis   lungs CTA b/l, no LE edema  am CXR with continued congestion +/- improvement from yesterday  -Lasix 40mg IV x 1 this morning  -3PM BMP   -consider further diuresis in evening  -strict I/O's, daily weights; monitor clinical volume status; daily CXR  -holding lisinopril  -will consider addition of beta-blocker pending further assessment    PULM:  #Pleural effusions 2/2 HFrEF  am CXR with marked clinical improvement from yesterday  -plan for Lasix 40mg IV x1 this morning; f/u 3pm BMP; will consider further diuresis this evening  -2LNC, wean as tolerated  -daily CXR    GI:  #GI Bleed - s/p EGD/C-scope without confirmed source of bleed; Hgb remains stable  currently tolerating Aspirin 81mg qd   -pantoprazole 40mg bid transitioned from iv to po  -monitor CBC  -maintain peripheral access    RENAL  #Hypernatremia   -continue to monitor in setting of continued diuresis  -3PM BMP - goal Na <154  -avoiding IVF in setting of fluid overload as above  -encourage PO fluid intake    NEURO  #Dementia; baseline AAOx1-2; patient appears to be mentating near baseline; waxes and wanes  -continue home medications donepezil and memantine  -aspiration precautions; f/u repeat S/S eval    ENDOCRINE:  #DMII; A1C 6.7 on admission; home regimen includes metformin 500mg bid   -mISS    F: none; encourage PO intake  E: replete prn  N: pureed DASH/CC diet; aspiration precautions  DVT PPx: SCDs  PT: OOB to chair / initial PT eval: VALERIE    Dispo: CCU  Code status: DNR/DNI; patient with overall poor prognosis

## 2020-02-20 NOTE — CHART NOTE - NSCHARTNOTEFT_GEN_A_CORE
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  DM2 (diabetes mellitus, type 2)  CAD, multiple vessel  H/O hernia repair  S/P CABG (coronary artery bypass graft)          GI Issues: BM+ 2/19  Pain: none per flow sheets  Skin Integrity: 1+ BL Arm edema / No pressure ulcers     Labs:   02-20    151<H>  |  115<H>  |  26<H>  ----------------------------<  142<H>  4.1   |  24  |  0.94    Ca    8.6      20 Feb 2020 05:34  Phos  3.5     02-20  Mg     2.4     02-20    TPro  6.5  /  Alb  2.8<L>  /  TBili  0.5  /  DBili  x   /  AST  16  /  ALT  25  /  AlkPhos  57  02-20    CAPILLARY BLOOD GLUCOSE      POCT Blood Glucose.: 203 mg/dL (20 Feb 2020 11:16)  POCT Blood Glucose.: 126 mg/dL (20 Feb 2020 06:10)  POCT Blood Glucose.: 120 mg/dL (19 Feb 2020 21:42)  POCT Blood Glucose.: 136 mg/dL (19 Feb 2020 16:42)      Medications:  MEDICATIONS  (STANDING):  albuterol/ipratropium for Nebulization. 3 milliLiter(s) Nebulizer every 6 hours  aspirin  chewable 81 milliGRAM(s) Oral daily  atorvastatin 40 milliGRAM(s) Oral at bedtime  chlorhexidine 2% Cloths 1 Application(s) Topical <User Schedule>  dextrose 5%. 1000 milliLiter(s) (50 mL/Hr) IV Continuous <Continuous>  dextrose 50% Injectable 12.5 Gram(s) IV Push once  dextrose 50% Injectable 25 Gram(s) IV Push once  dextrose 50% Injectable 25 Gram(s) IV Push once  donepezil 5 milliGRAM(s) Oral at bedtime  finasteride 5 milliGRAM(s) Oral daily  insulin lispro (HumaLOG) corrective regimen sliding scale   SubCutaneous Before meals and at bedtime  latanoprost 0.005% Ophthalmic Solution 1 Drop(s) Right EYE at bedtime  memantine 10 milliGRAM(s) Oral two times a day  pantoprazole    Tablet 40 milliGRAM(s) Oral every 12 hours  polyethylene glycol 3350 17 Gram(s) Oral two times a day    MEDICATIONS  (PRN):  dextrose 40% Gel 15 Gram(s) Oral once PRN Blood Glucose LESS THAN 70 milliGRAM(s)/deciliter  glucagon  Injectable 1 milliGRAM(s) IntraMuscular once PRN Glucose LESS THAN 70 milligrams/deciliter      5'0''  pounds +/-10%   (2/10) 150 pounds  (2/13) 153 pounds  (2/17) 145.9 pounds   (2/18) 137.1 pounds   (2/20) 141.7 pounds   %DLD=039% BMI=27.53 -- Based on most recent EMR wt   Wt has trended down since admission; CHF pt, diuretic use noted     Nutrition Focused Physical Exam: Completed [  X -limited given pt ability to participate at this time ]  Not Pertinent [   ]  Muscle Wasting- Temporal [ Moderate ]  Clavicle/Pectoral [   ]  Shoulder/Deltoid [   ]  Scapula [   ]  Interosseous [   ]  Quadriceps [   ]  Gastrocnemius [   ]  Fat Wasting- Orbital [   ]  Buccal [   ]  Triceps [   ]  Rib [   ]      IBW used to calculate energy needs due to pt's current body weight exceeding 120% of IBW  Adjusted for age, CHF, fluids per team  1200-1440kcal/day 25-30kcal/kg  58gm/day 1.2gm/kg     Subjective:   86yo M, HX of CAD, hx CABG approx 10 years ago, DM2, hernia, dementia (AAOx2 at baseline). Pt with AMS PTA, presents for NSTEMI vs Type II MI, cardiogenic shock vs less likely hypovolemic shock from GIB, Acute CHF EF 25% volume overload - need for repeat ECHO noted. Pt with GIB (PTA witnessed episode of melena, stool guiac positive at [facility]), however EGD did not find source of bleeding, +duodenal mass; c-scope found no sources of bleeding with some diverticula. Pt s/p intubation in endo suite, Remains in CCU at this time, GOC 2/15 pt to be Full Code. Per progress note 2/18 - became stuporous with minimal responsiveness, noted dementia, baseline AAOx1-2 + waxes and/wanes.   Per flow sheets passed bedside screen noted 2/10 and 2/13, s/p swallow eval 2/18 - limited and PO trials were not provided 2/2 hypoarousal. Recs for NPO w/ alternate means of nutrition/hydration/medication as pt continues to present w/ poor arousal and minimal responsiveness. Given tolerance of diet per RN when awake and alert, continue puree diet w/ thin liquids as tolerated only if awake, alert, attentive, energized, w/ strict aspiration precautions. SLP F/U noted 2/19 - poor arousal and mental status, recs for NPO w/ short-term alternative means of nutrition/hydration/medication via NGT. Pt currently on DASH TLC consCHO puree diet 2/13; RN reports pt consumed 100% of breakfast today without issues. Per team plan is for pt to be fed when awake/alert, should pt not be awake/alert will not fed pt.   Spoke with team, Please see below for nutrition recommendations.    Previous Nutrition Diagnosis: Inadequate Energy Intake Etiology rt intake<EER Signs/Symptoms aeb NPO.   Active [   ]  Resolved [x]  Previous Nutrition Diagnosis: Inadequate PO intake RT decreased appetite, AMS, AEB 0% PO   Active [   ]  Resolved [x]  New PES: Difficulty swallowing RT AMS AEB SLP Recs for puree diet vs NPO   Goal: Pt will meet at least 75% of nutrient needs via safe feasible route consistent with GOC     Recommendations:  Per team plan for PO diet while pt awake/alert enough to eat; Should PO remain, recommend use of low sodium, Consistent Carbohydrate with evening snack diet + PO consistency per SLP Rec (Pending order placed). Recommend use of ASP Precautions. Closely Monitor diet tolerance. Recommend NPO should pt be too lethargic for meals. In the event TF indicated / consistent with GOC / medically feasible consider use of Glucerna 1.2 x22hrs goal rate 45ml/hr to provide ~990ml, 1188kcal 59gm prot, 797ml water. Monitor Skin, Labs, wts, GI distress, GOC. RD to remain available for additional nutrition interventions as needed.     Education: NA.     Risk Level: High [ X ] Moderate [   ] Low [   ]. 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  DM2 (diabetes mellitus, type 2)  CAD, multiple vessel  H/O hernia repair  S/P CABG (coronary artery bypass graft)          GI Issues: BM+ 2/19  Pain: none per flow sheets  Skin Integrity: 1+ BL Arm edema / No pressure ulcers     Labs:   02-20    151<H>  |  115<H>  |  26<H>  ----------------------------<  142<H>  4.1   |  24  |  0.94    Ca    8.6      20 Feb 2020 05:34  Phos  3.5     02-20  Mg     2.4     02-20    TPro  6.5  /  Alb  2.8<L>  /  TBili  0.5  /  DBili  x   /  AST  16  /  ALT  25  /  AlkPhos  57  02-20    CAPILLARY BLOOD GLUCOSE      POCT Blood Glucose.: 203 mg/dL (20 Feb 2020 11:16)  POCT Blood Glucose.: 126 mg/dL (20 Feb 2020 06:10)  POCT Blood Glucose.: 120 mg/dL (19 Feb 2020 21:42)  POCT Blood Glucose.: 136 mg/dL (19 Feb 2020 16:42)      Medications:  MEDICATIONS  (STANDING):  albuterol/ipratropium for Nebulization. 3 milliLiter(s) Nebulizer every 6 hours  aspirin  chewable 81 milliGRAM(s) Oral daily  atorvastatin 40 milliGRAM(s) Oral at bedtime  chlorhexidine 2% Cloths 1 Application(s) Topical <User Schedule>  dextrose 5%. 1000 milliLiter(s) (50 mL/Hr) IV Continuous <Continuous>  dextrose 50% Injectable 12.5 Gram(s) IV Push once  dextrose 50% Injectable 25 Gram(s) IV Push once  dextrose 50% Injectable 25 Gram(s) IV Push once  donepezil 5 milliGRAM(s) Oral at bedtime  finasteride 5 milliGRAM(s) Oral daily  insulin lispro (HumaLOG) corrective regimen sliding scale   SubCutaneous Before meals and at bedtime  latanoprost 0.005% Ophthalmic Solution 1 Drop(s) Right EYE at bedtime  memantine 10 milliGRAM(s) Oral two times a day  pantoprazole    Tablet 40 milliGRAM(s) Oral every 12 hours  polyethylene glycol 3350 17 Gram(s) Oral two times a day    MEDICATIONS  (PRN):  dextrose 40% Gel 15 Gram(s) Oral once PRN Blood Glucose LESS THAN 70 milliGRAM(s)/deciliter  glucagon  Injectable 1 milliGRAM(s) IntraMuscular once PRN Glucose LESS THAN 70 milligrams/deciliter      5'0''  pounds +/-10%   (2/10) 150 pounds  (2/13) 153 pounds  (2/17) 145.9 pounds   (2/18) 137.1 pounds   (2/20) 141.7 pounds   %KBZ=583% BMI=27.53 -- Based on most recent EMR wt   Wt has trended down since admission; CHF pt, diuretic use noted     Nutrition Focused Physical Exam: Completed [  X -limited given pt ability to participate at this time ]  Not Pertinent [   ]  Muscle Wasting- Temporal [ Moderate ]  Clavicle/Pectoral [   ]  Shoulder/Deltoid [   ]  Scapula [   ]  Interosseous [   ]  Quadriceps [   ]  Gastrocnemius [   ]  Fat Wasting- Orbital [   ]  Buccal [   ]  Triceps [   ]  Rib [   ]      IBW used to calculate energy needs due to pt's current body weight exceeding 120% of IBW  Adjusted for age, CHF, fluids per team  1200-1440kcal/day 25-30kcal/kg  58gm/day 1.2gm/kg     Subjective:   88yo M, HX of CAD, hx CABG approx 10 years ago, DM2, hernia, dementia (AAOx2 at baseline). Pt with AMS PTA, presents for NSTEMI vs Type II MI, cardiogenic shock vs less likely hypovolemic shock from GIB, Acute CHF EF 25% volume overload - need for repeat ECHO noted. Pt with GIB (PTA witnessed episode of melena, stool guiac positive at [facility]), however EGD did not find source of bleeding, +duodenal mass; c-scope found no sources of bleeding with some diverticula. Pt s/p intubation in endo suite, Remains in CCU at this time, GOC 2/15 pt to be Full Code. Per progress note 2/18 - became stuporous with minimal responsiveness, noted dementia, baseline AAOx1-2 + waxes and/wanes.   Per flow sheets passed bedside screen noted 2/10 and 2/13, s/p swallow eval 2/18 - limited and PO trials were not provided 2/2 hypoarousal. Recs for NPO w/ alternate means of nutrition/hydration/medication as pt continues to present w/ poor arousal and minimal responsiveness. Given tolerance of diet per RN when awake and alert, continue puree diet w/ thin liquids as tolerated only if awake, alert, attentive, energized, w/ strict aspiration precautions. SLP F/U noted 2/19 - poor arousal and mental status, recs for NPO w/ short-term alternative means of nutrition/hydration/medication via NGT. Pt currently on DASH TLC consCHO puree diet 2/13; RN reports pt consumed 100% of breakfast today without issues. Per team plan is for pt to be fed when awake/alert, should pt not be awake/alert will not fed pt.   Spoke with team, Please see below for nutrition recommendations.    Previous Nutrition Diagnosis: Inadequate Energy Intake Etiology rt intake<EER Signs/Symptoms aeb NPO.   Active [   ]  Resolved [x]  Previous Nutrition Diagnosis: Inadequate PO intake RT decreased appetite, AMS, AEB 0% PO   Active [   ]  Resolved [x]  New PES: Difficulty swallowing RT AMS AEB SLP Recs for puree diet vs NPO   Goal: Pt will meet at least 75% of nutrient needs via safe feasible route consistent with GOC     Recommendations:  Per team plan for PO diet while pt awake/alert enough to eat; Should PO remain, recommend use of low sodium, + Consistent Carbohydrate with evening snack diet PRN Pending BG/POCT (A1c 6.7% - DM DX however A1c goal is higher in older adults) + PO consistency per SLP Rec (Pending order placed). Recommend use of ASP Precautions. Closely Monitor diet tolerance. Recommend NPO should pt be too lethargic for meals. In the event TF indicated / consistent with GOC / medically feasible consider use of Glucerna 1.2 x22hrs goal rate 45ml/hr to provide ~990ml, 1188kcal 59gm prot, 797ml water. Monitor Skin, Labs, wts, GI distress, GOC. RD to remain available for additional nutrition interventions as needed.     Education: NA.     Risk Level: High [ X ] Moderate [   ] Low [   ]. 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  DM2 (diabetes mellitus, type 2)  CAD, multiple vessel  H/O hernia repair  S/P CABG (coronary artery bypass graft)          GI Issues: BM+ 2/19  Pain: none per flow sheets  Skin Integrity: 1+ BL Arm edema / No pressure ulcers     Labs:   02-20    151<H>  |  115<H>  |  26<H>  ----------------------------<  142<H>  4.1   |  24  |  0.94    Ca    8.6      20 Feb 2020 05:34  Phos  3.5     02-20  Mg     2.4     02-20    TPro  6.5  /  Alb  2.8<L>  /  TBili  0.5  /  DBili  x   /  AST  16  /  ALT  25  /  AlkPhos  57  02-20    CAPILLARY BLOOD GLUCOSE      POCT Blood Glucose.: 203 mg/dL (20 Feb 2020 11:16)  POCT Blood Glucose.: 126 mg/dL (20 Feb 2020 06:10)  POCT Blood Glucose.: 120 mg/dL (19 Feb 2020 21:42)  POCT Blood Glucose.: 136 mg/dL (19 Feb 2020 16:42)      Medications:  MEDICATIONS  (STANDING):  albuterol/ipratropium for Nebulization. 3 milliLiter(s) Nebulizer every 6 hours  aspirin  chewable 81 milliGRAM(s) Oral daily  atorvastatin 40 milliGRAM(s) Oral at bedtime  chlorhexidine 2% Cloths 1 Application(s) Topical <User Schedule>  dextrose 5%. 1000 milliLiter(s) (50 mL/Hr) IV Continuous <Continuous>  dextrose 50% Injectable 12.5 Gram(s) IV Push once  dextrose 50% Injectable 25 Gram(s) IV Push once  dextrose 50% Injectable 25 Gram(s) IV Push once  donepezil 5 milliGRAM(s) Oral at bedtime  finasteride 5 milliGRAM(s) Oral daily  insulin lispro (HumaLOG) corrective regimen sliding scale   SubCutaneous Before meals and at bedtime  latanoprost 0.005% Ophthalmic Solution 1 Drop(s) Right EYE at bedtime  memantine 10 milliGRAM(s) Oral two times a day  pantoprazole    Tablet 40 milliGRAM(s) Oral every 12 hours  polyethylene glycol 3350 17 Gram(s) Oral two times a day    MEDICATIONS  (PRN):  dextrose 40% Gel 15 Gram(s) Oral once PRN Blood Glucose LESS THAN 70 milliGRAM(s)/deciliter  glucagon  Injectable 1 milliGRAM(s) IntraMuscular once PRN Glucose LESS THAN 70 milligrams/deciliter      5'0''  pounds +/-10%   (2/10) 150 pounds  (2/13) 153 pounds  (2/17) 145.9 pounds   (2/18) 137.1 pounds   (2/20) 141.7 pounds   %QRB=588% BMI=27.53 -- Based on most recent EMR wt   Wt has trended down since admission; CHF pt, diuretic use noted     Nutrition Focused Physical Exam: Completed [  X -limited given pt ability to participate at this time ]  Not Pertinent [   ]  Muscle Wasting- Temporal [ Moderate ]  Clavicle/Pectoral [   ]  Shoulder/Deltoid [   ]  Scapula [   ]  Interosseous [   ]  Quadriceps [   ]  Gastrocnemius [   ]  Fat Wasting- Orbital [   ]  Buccal [   ]  Triceps [   ]  Rib [   ]      IBW used to calculate energy needs due to pt's current body weight exceeding 120% of IBW  Adjusted for age, CHF, fluids per team  1200-1440kcal/day 25-30kcal/kg  58gm/day 1.2gm/kg     Subjective:   86yo M, HX of CAD, hx CABG approx 10 years ago, DM2, hernia, dementia (AAOx2 at baseline). Pt with AMS PTA, presents for NSTEMI vs Type II MI, cardiogenic shock vs less likely hypovolemic shock from GIB, Acute CHF EF 25% volume overload - need for repeat ECHO noted. Pt with GIB (PTA witnessed episode of melena, stool guiac positive at [facility]), however EGD did not find source of bleeding, +duodenal mass; c-scope found no sources of bleeding with some diverticula. Pt s/p intubation in endo suite, Remains in CCU at this time, GOC 2/15 pt to be Full Code. Per progress note 2/18 - became stuporous with minimal responsiveness, noted dementia, baseline AAOx1-2 + waxes and/wanes.   Per flow sheets passed bedside screen noted 2/10 and 2/13, s/p swallow eval 2/18 - limited and PO trials were not provided 2/2 hypoarousal. Recs for NPO w/ alternate means of nutrition/hydration/medication as pt continues to present w/ poor arousal and minimal responsiveness. Given tolerance of diet per RN when awake and alert, continue puree diet w/ thin liquids as tolerated only if awake, alert, attentive, energized, w/ strict aspiration precautions. SLP F/U noted 2/19 - poor arousal and mental status, recs for NPO w/ short-term alternative means of nutrition/hydration/medication via NGT. Pt currently on DASH TLC consCHO puree diet 2/13; RN reports pt consumed 100% of breakfast today without issues. Per team plan is for pt to be fed when awake/alert, should pt not be awake/alert will not fed pt.   Spoke with team, Please see below for nutrition recommendations.    Previous Nutrition Diagnosis: Inadequate Energy Intake Etiology rt intake<EER Signs/Symptoms aeb NPO.   Active [   ]  Resolved [x]  Previous Nutrition Diagnosis: Inadequate PO intake RT decreased appetite, AMS, AEB 0% PO   Active [   ]  Resolved [x]  New PES: Difficulty swallowing RT AMS AEB SLP Recs for puree diet vs NPO   Goal: Pt will meet at least 75% of nutrient needs via safe feasible route consistent with GOC     Recommendations:  Per team plan for PO diet while pt awake/alert enough to eat; Should PO remain, recommend use of DASH, + Consistent Carbohydrate with evening snack diet PRN Pending BG/POCT (A1c 6.7% - DM DX however A1c goal is higher in older adults) + PO consistency per SLP Rec. Recommend use of ASP Precautions. Closely Monitor diet tolerance. Recommend NPO should pt be too lethargic for meals. In the event TF indicated / consistent with GOC / medically feasible consider use of Glucerna 1.2 x22hrs goal rate 45ml/hr to provide ~990ml, 1188kcal 59gm prot, 797ml water. Monitor Skin, Labs, wts, GI distress, GOC. RD to remain available for additional nutrition interventions as needed.     Education: NA.     Risk Level: High [ X ] Moderate [   ] Low [   ].

## 2020-02-21 LAB
ALBUMIN SERPL ELPH-MCNC: 3.1 G/DL — LOW (ref 3.3–5)
ALP SERPL-CCNC: 71 U/L — SIGNIFICANT CHANGE UP (ref 40–120)
ALT FLD-CCNC: 36 U/L — SIGNIFICANT CHANGE UP (ref 10–45)
ANION GAP SERPL CALC-SCNC: 11 MMOL/L — SIGNIFICANT CHANGE UP (ref 5–17)
ANION GAP SERPL CALC-SCNC: 13 MMOL/L — SIGNIFICANT CHANGE UP (ref 5–17)
AST SERPL-CCNC: 43 U/L — HIGH (ref 10–40)
BILIRUB SERPL-MCNC: 0.3 MG/DL — SIGNIFICANT CHANGE UP (ref 0.2–1.2)
BUN SERPL-MCNC: 30 MG/DL — HIGH (ref 7–23)
BUN SERPL-MCNC: 32 MG/DL — HIGH (ref 7–23)
CALCIUM SERPL-MCNC: 8.7 MG/DL — SIGNIFICANT CHANGE UP (ref 8.4–10.5)
CALCIUM SERPL-MCNC: 9.1 MG/DL — SIGNIFICANT CHANGE UP (ref 8.4–10.5)
CHLORIDE SERPL-SCNC: 116 MMOL/L — HIGH (ref 96–108)
CHLORIDE SERPL-SCNC: 118 MMOL/L — HIGH (ref 96–108)
CO2 SERPL-SCNC: 24 MMOL/L — SIGNIFICANT CHANGE UP (ref 22–31)
CO2 SERPL-SCNC: 25 MMOL/L — SIGNIFICANT CHANGE UP (ref 22–31)
CREAT SERPL-MCNC: 0.94 MG/DL — SIGNIFICANT CHANGE UP (ref 0.5–1.3)
CREAT SERPL-MCNC: 1.05 MG/DL — SIGNIFICANT CHANGE UP (ref 0.5–1.3)
GLUCOSE BLDC GLUCOMTR-MCNC: 139 MG/DL — HIGH (ref 70–99)
GLUCOSE BLDC GLUCOMTR-MCNC: 143 MG/DL — HIGH (ref 70–99)
GLUCOSE BLDC GLUCOMTR-MCNC: 163 MG/DL — HIGH (ref 70–99)
GLUCOSE BLDC GLUCOMTR-MCNC: 184 MG/DL — HIGH (ref 70–99)
GLUCOSE SERPL-MCNC: 185 MG/DL — HIGH (ref 70–99)
GLUCOSE SERPL-MCNC: 189 MG/DL — HIGH (ref 70–99)
HCT VFR BLD CALC: 35.4 % — LOW (ref 39–50)
HGB BLD-MCNC: 10.3 G/DL — LOW (ref 13–17)
MAGNESIUM SERPL-MCNC: 2.3 MG/DL — SIGNIFICANT CHANGE UP (ref 1.6–2.6)
MCHC RBC-ENTMCNC: 28.7 PG — SIGNIFICANT CHANGE UP (ref 27–34)
MCHC RBC-ENTMCNC: 29.1 GM/DL — LOW (ref 32–36)
MCV RBC AUTO: 98.6 FL — SIGNIFICANT CHANGE UP (ref 80–100)
NRBC # BLD: 0 /100 WBCS — SIGNIFICANT CHANGE UP (ref 0–0)
PHOSPHATE SERPL-MCNC: 2.9 MG/DL — SIGNIFICANT CHANGE UP (ref 2.5–4.5)
PLATELET # BLD AUTO: 301 K/UL — SIGNIFICANT CHANGE UP (ref 150–400)
POTASSIUM SERPL-MCNC: 3.7 MMOL/L — SIGNIFICANT CHANGE UP (ref 3.5–5.3)
POTASSIUM SERPL-MCNC: 3.9 MMOL/L — SIGNIFICANT CHANGE UP (ref 3.5–5.3)
POTASSIUM SERPL-SCNC: 3.7 MMOL/L — SIGNIFICANT CHANGE UP (ref 3.5–5.3)
POTASSIUM SERPL-SCNC: 3.9 MMOL/L — SIGNIFICANT CHANGE UP (ref 3.5–5.3)
PROT SERPL-MCNC: 6.6 G/DL — SIGNIFICANT CHANGE UP (ref 6–8.3)
RBC # BLD: 3.59 M/UL — LOW (ref 4.2–5.8)
RBC # FLD: 14.6 % — HIGH (ref 10.3–14.5)
SODIUM SERPL-SCNC: 151 MMOL/L — HIGH (ref 135–145)
SODIUM SERPL-SCNC: 156 MMOL/L — HIGH (ref 135–145)
WBC # BLD: 12.8 K/UL — HIGH (ref 3.8–10.5)
WBC # FLD AUTO: 12.8 K/UL — HIGH (ref 3.8–10.5)

## 2020-02-21 PROCEDURE — 99291 CRITICAL CARE FIRST HOUR: CPT

## 2020-02-21 PROCEDURE — 71045 X-RAY EXAM CHEST 1 VIEW: CPT | Mod: 26

## 2020-02-21 RX ORDER — SODIUM CHLORIDE 9 MG/ML
250 INJECTION, SOLUTION INTRAVENOUS
Refills: 0 | Status: DISCONTINUED | OUTPATIENT
Start: 2020-02-21 | End: 2020-02-22

## 2020-02-21 RX ORDER — FUROSEMIDE 40 MG
20 TABLET ORAL ONCE
Refills: 0 | Status: COMPLETED | OUTPATIENT
Start: 2020-02-21 | End: 2020-02-21

## 2020-02-21 RX ORDER — SODIUM CHLORIDE 9 MG/ML
250 INJECTION, SOLUTION INTRAVENOUS
Refills: 0 | Status: DISCONTINUED | OUTPATIENT
Start: 2020-02-21 | End: 2020-02-21

## 2020-02-21 RX ORDER — POTASSIUM CHLORIDE 20 MEQ
10 PACKET (EA) ORAL
Refills: 0 | Status: COMPLETED | OUTPATIENT
Start: 2020-02-21 | End: 2020-02-21

## 2020-02-21 RX ADMIN — Medication 2: at 12:48

## 2020-02-21 RX ADMIN — Medication 100 MILLIEQUIVALENT(S): at 09:24

## 2020-02-21 RX ADMIN — FINASTERIDE 5 MILLIGRAM(S): 5 TABLET, FILM COATED ORAL at 12:49

## 2020-02-21 RX ADMIN — CHLORHEXIDINE GLUCONATE 1 APPLICATION(S): 213 SOLUTION TOPICAL at 06:38

## 2020-02-21 RX ADMIN — Medication 3 MILLILITER(S): at 22:38

## 2020-02-21 RX ADMIN — Medication 3 MILLILITER(S): at 05:28

## 2020-02-21 RX ADMIN — Medication 2: at 06:38

## 2020-02-21 RX ADMIN — SODIUM CHLORIDE 50 MILLILITER(S): 9 INJECTION, SOLUTION INTRAVENOUS at 06:39

## 2020-02-21 RX ADMIN — Medication 81 MILLIGRAM(S): at 12:49

## 2020-02-21 RX ADMIN — LATANOPROST 1 DROP(S): 0.05 SOLUTION/ DROPS OPHTHALMIC; TOPICAL at 23:06

## 2020-02-21 RX ADMIN — PANTOPRAZOLE SODIUM 40 MILLIGRAM(S): 20 TABLET, DELAYED RELEASE ORAL at 18:57

## 2020-02-21 RX ADMIN — Medication 100 MILLIEQUIVALENT(S): at 11:15

## 2020-02-21 RX ADMIN — DONEPEZIL HYDROCHLORIDE 5 MILLIGRAM(S): 10 TABLET, FILM COATED ORAL at 23:06

## 2020-02-21 RX ADMIN — Medication 3 MILLILITER(S): at 10:09

## 2020-02-21 RX ADMIN — Medication 20 MILLIGRAM(S): at 23:05

## 2020-02-21 RX ADMIN — ATORVASTATIN CALCIUM 40 MILLIGRAM(S): 80 TABLET, FILM COATED ORAL at 23:06

## 2020-02-21 RX ADMIN — SODIUM CHLORIDE 50 MILLILITER(S): 9 INJECTION, SOLUTION INTRAVENOUS at 07:34

## 2020-02-21 RX ADMIN — POLYETHYLENE GLYCOL 3350 17 GRAM(S): 17 POWDER, FOR SOLUTION ORAL at 18:57

## 2020-02-21 RX ADMIN — SODIUM CHLORIDE 50 MILLILITER(S): 9 INJECTION, SOLUTION INTRAVENOUS at 17:30

## 2020-02-21 RX ADMIN — MEMANTINE HYDROCHLORIDE 10 MILLIGRAM(S): 10 TABLET ORAL at 18:57

## 2020-02-21 RX ADMIN — Medication 3 MILLILITER(S): at 17:07

## 2020-02-21 RX ADMIN — Medication 100 MILLIEQUIVALENT(S): at 07:34

## 2020-02-21 NOTE — PROGRESS NOTE ADULT - SUBJECTIVE AND OBJECTIVE BOX
OVERNIGHT EVENTS: 10pm Na 156; gave D5W 250cc @50cc/h. AM Na 156; gave another D5W 250cc @50cc/h.   SUBJECTIVE: Patient seen and examined at the bedside. Patient is AAOx2 to self and place and able to respond to simple commands; appears comfortable; denies pain; ROS otherwise limited.    Vital Signs Last 12 Hrs  T(F): 98.6 (02-21-20 @ 09:34), Max: 98.6 (02-21-20 @ 09:34)  HR: 78 (02-21-20 @ 11:00) (68 - 85)  BP: 120/57 (02-21-20 @ 11:00) (109/56 - 124/68)  BP(mean): 82 (02-21-20 @ 11:00) (76 - 91)  RR: 12 (02-21-20 @ 11:00) (12 - 33)  SpO2: 96% (02-21-20 @ 11:00) (94% - 97%)    I&O's Summary  20 Feb 2020 07:01  -  21 Feb 2020 07:00  --------------------------------------------------------  IN: 400 mL / OUT: 1075 mL / NET: -675 mL    21 Feb 2020 07:01  -  21 Feb 2020 11:37  --------------------------------------------------------  IN: 550 mL / OUT: 100 mL / NET: 450 mL    PHYSICAL EXAM:  General: NAD, AAOx2, follows simple commands, resting comfortably in bed  HEENT: NCAT, PERRL, MMM  Neck: no JVD  Respiratory: CTA b/l normal respiratory effort, breathing comfortably on room air  Cardiovascular: normal S1/S2, RRR, no MRG  Vascular: 2+ radial/DP pulses b/l  Abdomen: +BS x4 quadrants, soft, NTND  Extremities: cool to touch, no clubbing, no cyanosis, no edema  Skin: no gross skin abnormalities or rashes noted    LABS:                        10.3   12.80 )-----------( 301      ( 21 Feb 2020 05:31 )             35.4     02-21    156<H>  |  118<H>  |  32<H>  ----------------------------<  189<H>  3.7   |  25  |  1.05    Ca    9.1      21 Feb 2020 05:31  Phos  2.9     02-21  Mg     2.3     02-21    TPro  6.6  /  Alb  3.1<L>  /  TBili  0.3  /  DBili  x   /  AST  43<H>  /  ALT  36  /  AlkPhos  71  02-21    RADIOLOGY & ADDITIONAL TESTS: Reviewed    MEDICATIONS  (STANDING):  albuterol/ipratropium for Nebulization. 3 milliLiter(s) Nebulizer every 6 hours  aspirin  chewable 81 milliGRAM(s) Oral daily  atorvastatin 40 milliGRAM(s) Oral at bedtime  chlorhexidine 2% Cloths 1 Application(s) Topical <User Schedule>  dextrose 5%. 1000 milliLiter(s) (50 mL/Hr) IV Continuous <Continuous>  dextrose 5%. 250 milliLiter(s) (50 mL/Hr) IV Continuous <Continuous>  dextrose 50% Injectable 12.5 Gram(s) IV Push once  dextrose 50% Injectable 25 Gram(s) IV Push once  dextrose 50% Injectable 25 Gram(s) IV Push once  donepezil 5 milliGRAM(s) Oral at bedtime  finasteride 5 milliGRAM(s) Oral daily  insulin lispro (HumaLOG) corrective regimen sliding scale   SubCutaneous Before meals and at bedtime  latanoprost 0.005% Ophthalmic Solution 1 Drop(s) Right EYE at bedtime  memantine 10 milliGRAM(s) Oral two times a day  pantoprazole    Tablet 40 milliGRAM(s) Oral every 12 hours  polyethylene glycol 3350 17 Gram(s) Oral two times a day    MEDICATIONS  (PRN):  dextrose 40% Gel 15 Gram(s) Oral once PRN Blood Glucose LESS THAN 70 milliGRAM(s)/deciliter  glucagon  Injectable 1 milliGRAM(s) IntraMuscular once PRN Glucose LESS THAN 70 milligrams/deciliter

## 2020-02-21 NOTE — PROGRESS NOTE ADULT - ASSESSMENT
87M  PMH CAD s/p CABG, HTN, HLD, DM, advanced dementia presenting with GI bleed and NSTEMI c/b cardiogenic shock, admitted to CCU for further monitoring and management.    CARDIOVASCULAR:  #CAD; known history of CAD s/p CABG (2003) with 2/3 grafts occluded as of 2009; poa with ACS vs Type II MI in setting of GI Bleed; troponins trended to peak.   Given significant GI bleed and unclear source despite EGD/C-scope, plan for conservative management of CAD as below:  -ASA 81mg qd  -Lipitor 40mg qhs    #HFrEF; EF 25% w/ global hypokinesis   lungs CTA b/l, no LE edema, breathing comfortably on room air  am CXR with continued congestion +/- improvement from yesterday  -will consider further diuresis today pending repeat BMP as below  -strict I/O's, daily weights; monitor clinical volume status; daily CXR  -holding lisinopril  -will consider addition of beta-blocker pending further assessment    PULM:  #Pleural effusions 2/2 HFrEF  patient continues to improve clinically  -will consider further diuresis today pending repeat BMP as below  -breathing comfortably on room air  -daily CXR    GI:  #GI Bleed - s/p EGD/C-scope without confirmed source of bleed; Hgb remains stable, currently without s/s active bleed  currently tolerating Aspirin 81mg qd   -pantoprazole 40mg po bid  -monitor CBC  -maintain peripheral access    RENAL  #Hypernatremia   -continue to monitor in setting of continued diuresis  -2M BMP   -avoiding IVF in setting of fluid overload as above  -encourage PO fluid intake    NEURO  #Dementia; baseline AAOx1-2; patient appears to be mentating near baseline; waxes and wanes throughout the day  -continue home medications donepezil and memantine  -aspiration precautions  -per repeat S/S eval: pureed    ENDOCRINE:  #DMII; A1C 6.7 on admission; home regimen includes metformin 500mg bid   -mISS    F: none; encourage PO intake  E: replete prn  N: pureed DASH/CC diet; aspiration precautions  DVT PPx: SCDs  PT: OOB to chair / PT eval: VALERIE    Dispo: CCU  Code status: DNR/DNI; patient with overall poor prognosis

## 2020-02-22 LAB
ALBUMIN SERPL ELPH-MCNC: 2.7 G/DL — LOW (ref 3.3–5)
ALP SERPL-CCNC: 65 U/L — SIGNIFICANT CHANGE UP (ref 40–120)
ALT FLD-CCNC: 30 U/L — SIGNIFICANT CHANGE UP (ref 10–45)
ANION GAP SERPL CALC-SCNC: 10 MMOL/L — SIGNIFICANT CHANGE UP (ref 5–17)
ANION GAP SERPL CALC-SCNC: 15 MMOL/L — SIGNIFICANT CHANGE UP (ref 5–17)
AST SERPL-CCNC: 23 U/L — SIGNIFICANT CHANGE UP (ref 10–40)
BILIRUB SERPL-MCNC: 0.4 MG/DL — SIGNIFICANT CHANGE UP (ref 0.2–1.2)
BUN SERPL-MCNC: 26 MG/DL — HIGH (ref 7–23)
BUN SERPL-MCNC: 29 MG/DL — HIGH (ref 7–23)
CALCIUM SERPL-MCNC: 9.1 MG/DL — SIGNIFICANT CHANGE UP (ref 8.4–10.5)
CALCIUM SERPL-MCNC: 9.1 MG/DL — SIGNIFICANT CHANGE UP (ref 8.4–10.5)
CHLORIDE SERPL-SCNC: 113 MMOL/L — HIGH (ref 96–108)
CHLORIDE SERPL-SCNC: 114 MMOL/L — HIGH (ref 96–108)
CO2 SERPL-SCNC: 23 MMOL/L — SIGNIFICANT CHANGE UP (ref 22–31)
CO2 SERPL-SCNC: 24 MMOL/L — SIGNIFICANT CHANGE UP (ref 22–31)
CREAT SERPL-MCNC: 0.95 MG/DL — SIGNIFICANT CHANGE UP (ref 0.5–1.3)
CREAT SERPL-MCNC: 0.97 MG/DL — SIGNIFICANT CHANGE UP (ref 0.5–1.3)
GLUCOSE BLDC GLUCOMTR-MCNC: 132 MG/DL — HIGH (ref 70–99)
GLUCOSE BLDC GLUCOMTR-MCNC: 153 MG/DL — HIGH (ref 70–99)
GLUCOSE BLDC GLUCOMTR-MCNC: 168 MG/DL — HIGH (ref 70–99)
GLUCOSE BLDC GLUCOMTR-MCNC: 211 MG/DL — HIGH (ref 70–99)
GLUCOSE SERPL-MCNC: 153 MG/DL — HIGH (ref 70–99)
GLUCOSE SERPL-MCNC: 222 MG/DL — HIGH (ref 70–99)
HCT VFR BLD CALC: 34.8 % — LOW (ref 39–50)
HCT VFR BLD CALC: 35.8 % — LOW (ref 39–50)
HGB BLD-MCNC: 10.2 G/DL — LOW (ref 13–17)
HGB BLD-MCNC: 10.7 G/DL — LOW (ref 13–17)
LACTATE SERPL-SCNC: 2.1 MMOL/L — HIGH (ref 0.5–2)
MAGNESIUM SERPL-MCNC: 2.2 MG/DL — SIGNIFICANT CHANGE UP (ref 1.6–2.6)
MAGNESIUM SERPL-MCNC: 2.4 MG/DL — SIGNIFICANT CHANGE UP (ref 1.6–2.6)
MCHC RBC-ENTMCNC: 28.5 PG — SIGNIFICANT CHANGE UP (ref 27–34)
MCHC RBC-ENTMCNC: 28.8 PG — SIGNIFICANT CHANGE UP (ref 27–34)
MCHC RBC-ENTMCNC: 29.3 GM/DL — LOW (ref 32–36)
MCHC RBC-ENTMCNC: 29.9 GM/DL — LOW (ref 32–36)
MCV RBC AUTO: 96.5 FL — SIGNIFICANT CHANGE UP (ref 80–100)
MCV RBC AUTO: 97.2 FL — SIGNIFICANT CHANGE UP (ref 80–100)
NRBC # BLD: 0 /100 WBCS — SIGNIFICANT CHANGE UP (ref 0–0)
NRBC # BLD: 0 /100 WBCS — SIGNIFICANT CHANGE UP (ref 0–0)
PHOSPHATE SERPL-MCNC: 3.5 MG/DL — SIGNIFICANT CHANGE UP (ref 2.5–4.5)
PHOSPHATE SERPL-MCNC: 3.8 MG/DL — SIGNIFICANT CHANGE UP (ref 2.5–4.5)
PLATELET # BLD AUTO: 295 K/UL — SIGNIFICANT CHANGE UP (ref 150–400)
PLATELET # BLD AUTO: 305 K/UL — SIGNIFICANT CHANGE UP (ref 150–400)
POTASSIUM SERPL-MCNC: 4 MMOL/L — SIGNIFICANT CHANGE UP (ref 3.5–5.3)
POTASSIUM SERPL-MCNC: 4.2 MMOL/L — SIGNIFICANT CHANGE UP (ref 3.5–5.3)
POTASSIUM SERPL-SCNC: 4 MMOL/L — SIGNIFICANT CHANGE UP (ref 3.5–5.3)
POTASSIUM SERPL-SCNC: 4.2 MMOL/L — SIGNIFICANT CHANGE UP (ref 3.5–5.3)
PROT SERPL-MCNC: 6.7 G/DL — SIGNIFICANT CHANGE UP (ref 6–8.3)
RBC # BLD: 3.58 M/UL — LOW (ref 4.2–5.8)
RBC # BLD: 3.71 M/UL — LOW (ref 4.2–5.8)
RBC # FLD: 14.8 % — HIGH (ref 10.3–14.5)
RBC # FLD: 14.8 % — HIGH (ref 10.3–14.5)
SODIUM SERPL-SCNC: 148 MMOL/L — HIGH (ref 135–145)
SODIUM SERPL-SCNC: 151 MMOL/L — HIGH (ref 135–145)
WBC # BLD: 11.02 K/UL — HIGH (ref 3.8–10.5)
WBC # BLD: 12.02 K/UL — HIGH (ref 3.8–10.5)
WBC # FLD AUTO: 11.02 K/UL — HIGH (ref 3.8–10.5)
WBC # FLD AUTO: 12.02 K/UL — HIGH (ref 3.8–10.5)

## 2020-02-22 PROCEDURE — 99291 CRITICAL CARE FIRST HOUR: CPT

## 2020-02-22 PROCEDURE — 71045 X-RAY EXAM CHEST 1 VIEW: CPT | Mod: 26

## 2020-02-22 RX ORDER — METOPROLOL TARTRATE 50 MG
12.5 TABLET ORAL ONCE
Refills: 0 | Status: COMPLETED | OUTPATIENT
Start: 2020-02-22 | End: 2020-02-22

## 2020-02-22 RX ORDER — AMIODARONE HYDROCHLORIDE 400 MG/1
150 TABLET ORAL ONCE
Refills: 0 | Status: COMPLETED | OUTPATIENT
Start: 2020-02-22 | End: 2020-02-22

## 2020-02-22 RX ORDER — AMIODARONE HYDROCHLORIDE 400 MG/1
1 TABLET ORAL
Qty: 900 | Refills: 0 | Status: DISCONTINUED | OUTPATIENT
Start: 2020-02-22 | End: 2020-02-22

## 2020-02-22 RX ORDER — FUROSEMIDE 40 MG
20 TABLET ORAL ONCE
Refills: 0 | Status: COMPLETED | OUTPATIENT
Start: 2020-02-22 | End: 2020-02-22

## 2020-02-22 RX ADMIN — MEMANTINE HYDROCHLORIDE 10 MILLIGRAM(S): 10 TABLET ORAL at 17:44

## 2020-02-22 RX ADMIN — Medication 2: at 12:30

## 2020-02-22 RX ADMIN — PANTOPRAZOLE SODIUM 40 MILLIGRAM(S): 20 TABLET, DELAYED RELEASE ORAL at 06:51

## 2020-02-22 RX ADMIN — AMIODARONE HYDROCHLORIDE 600 MILLIGRAM(S): 400 TABLET ORAL at 15:20

## 2020-02-22 RX ADMIN — Medication 3 MILLILITER(S): at 17:25

## 2020-02-22 RX ADMIN — Medication 12.5 MILLIGRAM(S): at 11:23

## 2020-02-22 RX ADMIN — LATANOPROST 1 DROP(S): 0.05 SOLUTION/ DROPS OPHTHALMIC; TOPICAL at 22:00

## 2020-02-22 RX ADMIN — MEMANTINE HYDROCHLORIDE 10 MILLIGRAM(S): 10 TABLET ORAL at 06:51

## 2020-02-22 RX ADMIN — PANTOPRAZOLE SODIUM 40 MILLIGRAM(S): 20 TABLET, DELAYED RELEASE ORAL at 17:44

## 2020-02-22 RX ADMIN — CHLORHEXIDINE GLUCONATE 1 APPLICATION(S): 213 SOLUTION TOPICAL at 06:51

## 2020-02-22 RX ADMIN — Medication 3 MILLILITER(S): at 06:48

## 2020-02-22 RX ADMIN — Medication 20 MILLIGRAM(S): at 11:23

## 2020-02-22 RX ADMIN — POLYETHYLENE GLYCOL 3350 17 GRAM(S): 17 POWDER, FOR SOLUTION ORAL at 17:44

## 2020-02-22 RX ADMIN — FINASTERIDE 5 MILLIGRAM(S): 5 TABLET, FILM COATED ORAL at 12:31

## 2020-02-22 RX ADMIN — POLYETHYLENE GLYCOL 3350 17 GRAM(S): 17 POWDER, FOR SOLUTION ORAL at 06:51

## 2020-02-22 RX ADMIN — Medication 3 MILLILITER(S): at 09:17

## 2020-02-22 RX ADMIN — ATORVASTATIN CALCIUM 40 MILLIGRAM(S): 80 TABLET, FILM COATED ORAL at 22:00

## 2020-02-22 RX ADMIN — Medication 4: at 17:45

## 2020-02-22 RX ADMIN — Medication 81 MILLIGRAM(S): at 12:31

## 2020-02-22 RX ADMIN — Medication 3 MILLILITER(S): at 21:28

## 2020-02-22 NOTE — PROGRESS NOTE ADULT - SUBJECTIVE AND OBJECTIVE BOX
note in progress    HOSPITAL COURSE:  87M PMH CAD (CABG 10y ago, 2/3 grafts occluded), dementia (AAOx2), DM-II, presenting to [place] with AMS, found with melanotic BM and elevated troponins concerning for NSTEMI vs demand ischemia 2/2 GI bleed; transferred to [facility] CCU for further care. Echo demonstrated EF of 25%. Dobutamine gtt was initiated for cardiogenic shock. Cardiac enzymes continued to uptrend; however management of NSTEMI complicated by continued melanotic BM’s requiring pRBC transfusion. Patient was intubated for C-scope and EGD, which did not identify source of bleeding; incidental duodenal mass visualized; pathology of mass remains unknown; extubated s/p scopes. Dobutamine gtt was discontinued. Initial attempt to initiate ASA, Plavix, heparin gtt was complicated by Hgb drop necessitating discontinuation of NSTEMI medical management. Upon continued monitoring of Hgb, ASA was initiated; tolerated well. Cardiac intervention was not pursued given inability to tolerate required post-procedure anticoagulation/DAPT; CCTA was not pursued given inability to tolerate beta blockade. For HFrEF, patient demonstrating improvement in fluid status with IV Lasix dosed per clinical presentation; diuresis complicated by asymptomatic hypernatremia now resolving. Patient medically stable for stepdown.     OVERNIGHT EVENTS: No acute events overnight.  SUBJECTIVE: Patient remains AAOx2 to self and place; able to respond to simple commands; appears comfortable sitting up in bed; denies pain; ROS otherwise limited.    Vital Signs Last 12 Hrs  T(F): 97.7 (02-22-20 @ 06:16), Max: 98.5 (02-21-20 @ 22:01)  HR: 81 (02-22-20 @ 08:00) (68 - 83)  BP: 122/59 (02-22-20 @ 08:00) (105/52 - 122/59)  BP(mean): 84 (02-22-20 @ 08:00) (73 - 85)  RR: 22 (02-22-20 @ 08:00) (18 - 28)  SpO2: 96% (02-22-20 @ 08:00) (95% - 99%)    I&O's Summary  Feb 2020 07:01  -  22 Feb 2020 07:00  --------------------------------------------------------  IN: 880 mL / OUT: 1250 mL / NET: -370 mL      PHYSICAL EXAM:  General: NAD, AAOx2, follows simple commands, resting comfortably  HEENT: NCAT, PERRL, MMM  Respiratory: bibasilar crackles otherwise CTA b/l; normal respiratory effort, breathing comfortably on room air  Cardiovascular: normal S1/S2, RRR, no MRG  Vascular: 2+ radial/DP pulses b/l  Abdomen: +BS x4 quadrants, soft, NTND  Extremities: cool to touch, no clubbing, no cyanosis, no edema  Skin: no gross skin abnormalities or rashes noted    LABS:                 10.2   11.02 )-----------( 295      ( 22 Feb 2020 06:10 )             34.8     02-22    148<H>  |  114<H>  |  26<H>  ----------------------------<  153<H>  4.0   |  24  |  0.95    Ca    9.1      22 Feb 2020 06:11  Phos  3.5     02-22  Mg     2.2     02-22    TPro  6.7  /  Alb  2.7<L>  /  TBili  0.4  /  DBili  x   /  AST  23  /  ALT  30  /  AlkPhos  65  02-22    RADIOLOGY & ADDITIONAL TESTS: Reviewed    MEDICATIONS  (STANDING):  albuterol/ipratropium for Nebulization. 3 milliLiter(s) Nebulizer every 6 hours  aspirin  chewable 81 milliGRAM(s) Oral daily  atorvastatin 40 milliGRAM(s) Oral at bedtime  chlorhexidine 2% Cloths 1 Application(s) Topical <User Schedule>  dextrose 5%. 1000 milliLiter(s) (50 mL/Hr) IV Continuous <Continuous>  dextrose 50% Injectable 12.5 Gram(s) IV Push once  dextrose 50% Injectable 25 Gram(s) IV Push once  dextrose 50% Injectable 25 Gram(s) IV Push once  donepezil 5 milliGRAM(s) Oral at bedtime  finasteride 5 milliGRAM(s) Oral daily  insulin lispro (HumaLOG) corrective regimen sliding scale   SubCutaneous Before meals and at bedtime  latanoprost 0.005% Ophthalmic Solution 1 Drop(s) Right EYE at bedtime  memantine 10 milliGRAM(s) Oral two times a day  pantoprazole    Tablet 40 milliGRAM(s) Oral every 12 hours  polyethylene glycol 3350 17 Gram(s) Oral two times a day    MEDICATIONS  (PRN):  dextrose 40% Gel 15 Gram(s) Oral once PRN Blood Glucose LESS THAN 70 milliGRAM(s)/deciliter  glucagon  Injectable 1 milliGRAM(s) IntraMuscular once PRN Glucose LESS THAN 70 milligrams/deciliter HOSPITAL COURSE:  87M PMH CAD (CABG 10y ago, 2/3 grafts occluded), dementia (AAOx2), DM-II, presenting to [place] with AMS, found with melanotic BM and elevated troponins concerning for NSTEMI vs demand ischemia 2/2 GI bleed; transferred to [facility] CCU for further care. Echo demonstrated EF of 25%. Dobutamine gtt was initiated for cardiogenic shock. Cardiac enzymes continued to uptrend; however management of NSTEMI complicated by continued melanotic BM’s requiring pRBC transfusion. Patient was intubated for C-scope and EGD, which did not identify source of bleeding; incidental duodenal mass visualized; pathology of mass remains unknown; extubated s/p scopes. Dobutamine gtt was discontinued. Initial attempt to initiate ASA, Plavix, heparin gtt was complicated by Hgb drop necessitating discontinuation of NSTEMI medical management. Upon continued monitoring of Hgb, ASA was initiated; tolerated well. Cardiac intervention was not pursued given inability to tolerate required post-procedure anticoagulation/DAPT; CCTA was not pursued given inability to tolerate beta blockade. For HFrEF, patient demonstrating improvement in fluid status with IV Lasix dosed per clinical presentation; diuresis complicated by asymptomatic hypernatremia now resolving. Patient medically stable for stepdown.     OVERNIGHT EVENTS: No acute events overnight.  SUBJECTIVE: Patient remains AAOx2 to self and place; able to respond to simple commands; appears comfortable sitting up in bed; denies pain; ROS otherwise limited.    Vital Signs Last 12 Hrs  T(F): 97.7 (02-22-20 @ 06:16), Max: 98.5 (02-21-20 @ 22:01)  HR: 81 (02-22-20 @ 08:00) (68 - 83)  BP: 122/59 (02-22-20 @ 08:00) (105/52 - 122/59)  BP(mean): 84 (02-22-20 @ 08:00) (73 - 85)  RR: 22 (02-22-20 @ 08:00) (18 - 28)  SpO2: 96% (02-22-20 @ 08:00) (95% - 99%)    I&O's Summary  Feb 2020 07:01  -  22 Feb 2020 07:00  --------------------------------------------------------  IN: 880 mL / OUT: 1250 mL / NET: -370 mL      PHYSICAL EXAM:  General: NAD, AAOx2, follows simple commands, resting comfortably  HEENT: NCAT, PERRL, MMM  Respiratory: bibasilar crackles otherwise CTA b/l; normal respiratory effort, breathing comfortably on room air  Cardiovascular: normal S1/S2, RRR, no MRG  Vascular: 2+ radial/DP pulses b/l  Abdomen: +BS x4 quadrants, soft, NTND  Extremities: cool to touch, no clubbing, no cyanosis, no edema  Skin: no gross skin abnormalities or rashes noted    LABS:                 10.2   11.02 )-----------( 295      ( 22 Feb 2020 06:10 )             34.8     02-22    148<H>  |  114<H>  |  26<H>  ----------------------------<  153<H>  4.0   |  24  |  0.95    Ca    9.1      22 Feb 2020 06:11  Phos  3.5     02-22  Mg     2.2     02-22    TPro  6.7  /  Alb  2.7<L>  /  TBili  0.4  /  DBili  x   /  AST  23  /  ALT  30  /  AlkPhos  65  02-22    RADIOLOGY & ADDITIONAL TESTS: Reviewed    MEDICATIONS  (STANDING):  albuterol/ipratropium for Nebulization. 3 milliLiter(s) Nebulizer every 6 hours  aspirin  chewable 81 milliGRAM(s) Oral daily  atorvastatin 40 milliGRAM(s) Oral at bedtime  chlorhexidine 2% Cloths 1 Application(s) Topical <User Schedule>  dextrose 5%. 1000 milliLiter(s) (50 mL/Hr) IV Continuous <Continuous>  dextrose 50% Injectable 12.5 Gram(s) IV Push once  dextrose 50% Injectable 25 Gram(s) IV Push once  dextrose 50% Injectable 25 Gram(s) IV Push once  donepezil 5 milliGRAM(s) Oral at bedtime  finasteride 5 milliGRAM(s) Oral daily  insulin lispro (HumaLOG) corrective regimen sliding scale   SubCutaneous Before meals and at bedtime  latanoprost 0.005% Ophthalmic Solution 1 Drop(s) Right EYE at bedtime  memantine 10 milliGRAM(s) Oral two times a day  pantoprazole    Tablet 40 milliGRAM(s) Oral every 12 hours  polyethylene glycol 3350 17 Gram(s) Oral two times a day    MEDICATIONS  (PRN):  dextrose 40% Gel 15 Gram(s) Oral once PRN Blood Glucose LESS THAN 70 milliGRAM(s)/deciliter  glucagon  Injectable 1 milliGRAM(s) IntraMuscular once PRN Glucose LESS THAN 70 milligrams/deciliter

## 2020-02-22 NOTE — PROGRESS NOTE ADULT - ASSESSMENT
87M  PMH CAD s/p CABG, HTN, HLD, DM, advanced dementia presenting with GI bleed and NSTEMI c/b cardiogenic shock, admitted to CCU for further monitoring and management.    CARDIOVASCULAR:  #CAD; known history of CAD s/p CABG (2003) with 2/3 grafts occluded as of 2009; poa with ACS vs Type II MI in setting of GI Bleed; troponins trended to peak.   Given significant GI bleed and unclear source despite EGD/C-scope, plan for conservative management of CAD as below:  -ASA 81mg qd  -Lipitor 40mg qhs    #HFrEF; EF 25% w/ global hypokinesis   lungs CTA b/l, no LE edema, breathing comfortably on room air  am CXR with continued congestion +/- improvement from yesterday  -will consider further diuresis today pending repeat BMP as below  -strict I/O's, daily weights; monitor clinical volume status; daily CXR  -holding lisinopril  -will consider addition of beta-blocker pending further assessment    PULM:  #Pleural effusions 2/2 HFrEF  patient continues to improve clinically  -will consider further diuresis today pending repeat BMP as below  -breathing comfortably on room air  -daily CXR    GI:  #GI Bleed - s/p EGD/C-scope without confirmed source of bleed; Hgb remains stable, currently without s/s active bleed  currently tolerating Aspirin 81mg qd   -pantoprazole 40mg po bid  -monitor CBC  -maintain peripheral access    RENAL  #Hypernatremia   -continue to monitor in setting of continued diuresis  -2M BMP   -avoiding IVF in setting of fluid overload as above  -encourage PO fluid intake    NEURO  #Dementia; baseline AAOx1-2; patient appears to be mentating near baseline; waxes and wanes throughout the day  -continue home medications donepezil and memantine  -aspiration precautions  -per repeat S/S eval: pureed    ENDOCRINE:  #DMII; A1C 6.7 on admission; home regimen includes metformin 500mg bid   -mISS    F: none; encourage PO intake  E: replete prn  N: pureed DASH/CC diet; aspiration precautions  DVT PPx: SCDs  PT: OOB to chair / PT eval: VALERIE    Dispo: CCU  Code status: DNR/DNI; patient with overall poor prognosis 87M  PMH CAD s/p CABG, HTN, HLD, DM, advanced dementia presenting with GI bleed and NSTEMI c/b cardiogenic shock, admitted to CCU for further monitoring and management.    CARDIOVASCULAR:  #CAD; known history of CAD s/p CABG (2003) with 2/3 grafts occluded as of 2009; poa with ACS vs Type II MI in setting of GI Bleed; troponins trended to peak.   Given significant GI bleed and unclear source despite EGD/C-scope, plan for conservative management of CAD as below:  -ASA 81mg qd  -Lipitor 40mg qhs  -Lopressor 12.5mg bid starting today; monitor response closely     #HFrEF; EF 25% w/ global hypokinesis   lungs CTA b/l, no LE edema, breathing comfortably on room air  am CXR with continued congestion with improvement from yesterday  -Lasix 20mg IV x1 today; will consider starting Lasix 40mg PO qd tomorrow  -strict I/O's, daily weights; monitor clinical volume status; daily CXR  -Lopressor 12.5mg bid; monitor response closely   -continue to hold lisinopril    PULM:  #Pleural effusions 2/2 HFrEF  patient continues to improve clinically  -Lasix 20mg IV x1 today; will consider starting Lasix 40mg PO qd tomorrow  -breathing comfortably on room air  -daily CXR    GI:  #GI Bleed - s/p EGD/C-scope without confirmed source of bleed; Hgb remains stable, currently without s/s active bleed  currently tolerating Aspirin 81mg qd   -pantoprazole 40mg po bid  -monitor CBC  -maintain peripheral access    RENAL  #Hypernatremia, asymptomatic; resolving  -continue to monitor in setting of continued diuresis  -avoiding IVF in setting of fluid overload as above  -encourage PO fluid intake    NEURO  #Dementia; baseline AAOx1-2; patient appears to be mentating near baseline; waxes and wanes throughout the day  Home regimen: memantine and donepezil  -continue home memantine  -trial of donepezil discontinuation  -aspiration precautions  -S/S eval: pureed diet    ENDOCRINE:  #DMII; A1C 6.7 on admission; home regimen includes metformin 500mg bid   -mISS    F: none; encourage PO intake  E: replete prn  N: pureed DASH/CC diet; aspiration precautions  DVT PPx: SCDs  PT: OOB to chair / PT mykelal: VALERIE    Dispo: CCU; stepdown  Code status: DNR/DNI; patient with overall poor prognosis

## 2020-02-23 LAB
ANION GAP SERPL CALC-SCNC: 13 MMOL/L — SIGNIFICANT CHANGE UP (ref 5–17)
ANION GAP SERPL CALC-SCNC: 15 MMOL/L — SIGNIFICANT CHANGE UP (ref 5–17)
BUN SERPL-MCNC: 30 MG/DL — HIGH (ref 7–23)
BUN SERPL-MCNC: 34 MG/DL — HIGH (ref 7–23)
CALCIUM SERPL-MCNC: 9.2 MG/DL — SIGNIFICANT CHANGE UP (ref 8.4–10.5)
CALCIUM SERPL-MCNC: 9.2 MG/DL — SIGNIFICANT CHANGE UP (ref 8.4–10.5)
CHLORIDE SERPL-SCNC: 112 MMOL/L — HIGH (ref 96–108)
CHLORIDE SERPL-SCNC: 113 MMOL/L — HIGH (ref 96–108)
CO2 SERPL-SCNC: 24 MMOL/L — SIGNIFICANT CHANGE UP (ref 22–31)
CO2 SERPL-SCNC: 24 MMOL/L — SIGNIFICANT CHANGE UP (ref 22–31)
CREAT SERPL-MCNC: 0.97 MG/DL — SIGNIFICANT CHANGE UP (ref 0.5–1.3)
CREAT SERPL-MCNC: 1.07 MG/DL — SIGNIFICANT CHANGE UP (ref 0.5–1.3)
GLUCOSE BLDC GLUCOMTR-MCNC: 148 MG/DL — HIGH (ref 70–99)
GLUCOSE BLDC GLUCOMTR-MCNC: 167 MG/DL — HIGH (ref 70–99)
GLUCOSE BLDC GLUCOMTR-MCNC: 168 MG/DL — HIGH (ref 70–99)
GLUCOSE BLDC GLUCOMTR-MCNC: 174 MG/DL — HIGH (ref 70–99)
GLUCOSE SERPL-MCNC: 148 MG/DL — HIGH (ref 70–99)
GLUCOSE SERPL-MCNC: 166 MG/DL — HIGH (ref 70–99)
HCT VFR BLD CALC: 36.4 % — LOW (ref 39–50)
HGB BLD-MCNC: 10.9 G/DL — LOW (ref 13–17)
MAGNESIUM SERPL-MCNC: 2.3 MG/DL — SIGNIFICANT CHANGE UP (ref 1.6–2.6)
MAGNESIUM SERPL-MCNC: 2.4 MG/DL — SIGNIFICANT CHANGE UP (ref 1.6–2.6)
MCHC RBC-ENTMCNC: 28.6 PG — SIGNIFICANT CHANGE UP (ref 27–34)
MCHC RBC-ENTMCNC: 29.9 GM/DL — LOW (ref 32–36)
MCV RBC AUTO: 95.5 FL — SIGNIFICANT CHANGE UP (ref 80–100)
NRBC # BLD: 0 /100 WBCS — SIGNIFICANT CHANGE UP (ref 0–0)
PHOSPHATE SERPL-MCNC: 3.5 MG/DL — SIGNIFICANT CHANGE UP (ref 2.5–4.5)
PHOSPHATE SERPL-MCNC: 4.2 MG/DL — SIGNIFICANT CHANGE UP (ref 2.5–4.5)
PLATELET # BLD AUTO: 299 K/UL — SIGNIFICANT CHANGE UP (ref 150–400)
POTASSIUM SERPL-MCNC: 4 MMOL/L — SIGNIFICANT CHANGE UP (ref 3.5–5.3)
POTASSIUM SERPL-MCNC: 4.2 MMOL/L — SIGNIFICANT CHANGE UP (ref 3.5–5.3)
POTASSIUM SERPL-SCNC: 4 MMOL/L — SIGNIFICANT CHANGE UP (ref 3.5–5.3)
POTASSIUM SERPL-SCNC: 4.2 MMOL/L — SIGNIFICANT CHANGE UP (ref 3.5–5.3)
RBC # BLD: 3.81 M/UL — LOW (ref 4.2–5.8)
RBC # FLD: 14.8 % — HIGH (ref 10.3–14.5)
SODIUM SERPL-SCNC: 150 MMOL/L — HIGH (ref 135–145)
SODIUM SERPL-SCNC: 151 MMOL/L — HIGH (ref 135–145)
WBC # BLD: 12.03 K/UL — HIGH (ref 3.8–10.5)
WBC # FLD AUTO: 12.03 K/UL — HIGH (ref 3.8–10.5)

## 2020-02-23 PROCEDURE — 99291 CRITICAL CARE FIRST HOUR: CPT

## 2020-02-23 PROCEDURE — 71045 X-RAY EXAM CHEST 1 VIEW: CPT | Mod: 26

## 2020-02-23 RX ORDER — PANTOPRAZOLE SODIUM 20 MG/1
40 TABLET, DELAYED RELEASE ORAL
Refills: 0 | Status: DISCONTINUED | OUTPATIENT
Start: 2020-02-23 | End: 2020-02-28

## 2020-02-23 RX ORDER — FUROSEMIDE 40 MG
20 TABLET ORAL ONCE
Refills: 0 | Status: COMPLETED | OUTPATIENT
Start: 2020-02-23 | End: 2020-02-23

## 2020-02-23 RX ORDER — METOPROLOL TARTRATE 50 MG
12.5 TABLET ORAL EVERY 12 HOURS
Refills: 0 | Status: DISCONTINUED | OUTPATIENT
Start: 2020-02-23 | End: 2020-02-23

## 2020-02-23 RX ORDER — SODIUM CHLORIDE 9 MG/ML
250 INJECTION, SOLUTION INTRAVENOUS ONCE
Refills: 0 | Status: COMPLETED | OUTPATIENT
Start: 2020-02-23 | End: 2020-02-23

## 2020-02-23 RX ADMIN — Medication 3 MILLILITER(S): at 05:21

## 2020-02-23 RX ADMIN — Medication 3 MILLILITER(S): at 21:12

## 2020-02-23 RX ADMIN — MEMANTINE HYDROCHLORIDE 10 MILLIGRAM(S): 10 TABLET ORAL at 10:37

## 2020-02-23 RX ADMIN — ATORVASTATIN CALCIUM 40 MILLIGRAM(S): 80 TABLET, FILM COATED ORAL at 21:45

## 2020-02-23 RX ADMIN — Medication 2: at 21:46

## 2020-02-23 RX ADMIN — POLYETHYLENE GLYCOL 3350 17 GRAM(S): 17 POWDER, FOR SOLUTION ORAL at 07:14

## 2020-02-23 RX ADMIN — LATANOPROST 1 DROP(S): 0.05 SOLUTION/ DROPS OPHTHALMIC; TOPICAL at 21:45

## 2020-02-23 RX ADMIN — Medication 3 MILLILITER(S): at 15:28

## 2020-02-23 RX ADMIN — Medication 81 MILLIGRAM(S): at 11:16

## 2020-02-23 RX ADMIN — PANTOPRAZOLE SODIUM 40 MILLIGRAM(S): 20 TABLET, DELAYED RELEASE ORAL at 07:14

## 2020-02-23 RX ADMIN — FINASTERIDE 5 MILLIGRAM(S): 5 TABLET, FILM COATED ORAL at 11:15

## 2020-02-23 RX ADMIN — Medication 12.5 MILLIGRAM(S): at 11:15

## 2020-02-23 RX ADMIN — PANTOPRAZOLE SODIUM 40 MILLIGRAM(S): 20 TABLET, DELAYED RELEASE ORAL at 18:13

## 2020-02-23 RX ADMIN — SODIUM CHLORIDE 62.5 MILLILITER(S): 9 INJECTION, SOLUTION INTRAVENOUS at 18:10

## 2020-02-23 RX ADMIN — POLYETHYLENE GLYCOL 3350 17 GRAM(S): 17 POWDER, FOR SOLUTION ORAL at 18:13

## 2020-02-23 RX ADMIN — Medication 2: at 07:31

## 2020-02-23 RX ADMIN — CHLORHEXIDINE GLUCONATE 1 APPLICATION(S): 213 SOLUTION TOPICAL at 07:14

## 2020-02-23 RX ADMIN — Medication 20 MILLIGRAM(S): at 10:37

## 2020-02-23 RX ADMIN — Medication 2: at 18:09

## 2020-02-23 NOTE — PROGRESS NOTE ADULT - ASSESSMENT
87M  PMH CAD s/p CABG, HTN, HLD, DM, advanced dementia presenting with GI bleed and NSTEMI c/b cardiogenic shock, admitted to CCU for further monitoring and management.    CARDIOVASCULAR:  #CAD; known history of CAD s/p CABG (2003) with 2/3 grafts occluded as of 2009; poa with ACS vs Type II MI in setting of GI Bleed; troponins trended to peak.   Given significant GI bleed and unclear source despite EGD/C-scope, plan for conservative management of CAD as below:  -ASA 81mg qd  -Lipitor 40mg qhs  -Lopressor 12.5mg bid starting today; monitor response closely     #HFrEF; EF 25% w/ global hypokinesis   lungs CTA b/l, no LE edema, breathing comfortably on room air  am CXR with improvement of congestion, stable from yesterday  -Lasix 20mg IV x1 today; will consider starting Lasix 40mg PO qd tomorrow  -strict I/O's, daily weights; monitor clinical volume status; daily CXR  -Lopressor 12.5mg bid; monitor response closely   -continue to hold lisinopril    PULM:  #Pleural effusions 2/2 HFrEF  patient continues to improve clinically  -Lasix 20mg IV x1 today; will consider starting Lasix 40mg PO qd tomorrow  -breathing comfortably on room air  -daily CXR    GI:  #GI Bleed - s/p EGD/C-scope without confirmed source of bleed; Hgb remains stable, currently without s/s active bleed  currently tolerating Aspirin 81mg qd   -pantoprazole 40mg po bid  -monitor CBC  -maintain peripheral access    RENAL  #Hypernatremia, asymptomatic; resolving  -continue to monitor in setting of continued diuresis  -avoiding IVF in setting of fluid overload as above, slow administrations of 250cc D5W if Na increasing  -encourage PO fluid intake    NEURO  #Dementia; baseline AAOx1-2; patient appears to be mentating near baseline; waxes and wanes throughout the day  Home regimen: memantine and donepezil  -continue home memantine  -trial of donepezil discontinuation  -aspiration precautions  -S/S eval: pureed diet with thin liquids    ENDOCRINE:  #DMII; A1C 6.7 on admission; home regimen includes metformin 500mg bid   -mISS    F: none; encourage PO intake  E: replete prn  N: pureed DASH/CC diet with thin liquids; aspiration precautions  DVT PPx: SCDs  PT: OOB to chair / PT eval: VALERIE    Dispo: CCU  Code status: DNR/DNI; patient with overall poor prognosis

## 2020-02-23 NOTE — PROGRESS NOTE ADULT - SUBJECTIVE AND OBJECTIVE BOX
OVERNIGHT EVENTS: NAEO.    SUBJECTIVE / INTERVAL HPI: Patient seen and examined at bedside. Pt at baseline mentation, A&Ox2, follows simple commands. Does not offer any complaints at this time. Appears comfortable.    MEDICATIONS  (STANDING):  albuterol/ipratropium for Nebulization. 3 milliLiter(s) Nebulizer every 6 hours  aspirin  chewable 81 milliGRAM(s) Oral daily  atorvastatin 40 milliGRAM(s) Oral at bedtime  chlorhexidine 2% Cloths 1 Application(s) Topical <User Schedule>  dextrose 5%. 1000 milliLiter(s) (50 mL/Hr) IV Continuous <Continuous>  dextrose 50% Injectable 12.5 Gram(s) IV Push once  dextrose 50% Injectable 25 Gram(s) IV Push once  dextrose 50% Injectable 25 Gram(s) IV Push once  finasteride 5 milliGRAM(s) Oral daily  insulin lispro (HumaLOG) corrective regimen sliding scale   SubCutaneous Before meals and at bedtime  latanoprost 0.005% Ophthalmic Solution 1 Drop(s) Right EYE at bedtime  memantine 10 milliGRAM(s) Oral two times a day  metoprolol tartrate 12.5 milliGRAM(s) Oral every 12 hours  pantoprazole    Tablet 40 milliGRAM(s) Oral every 12 hours  polyethylene glycol 3350 17 Gram(s) Oral two times a day    MEDICATIONS  (PRN):  dextrose 40% Gel 15 Gram(s) Oral once PRN Blood Glucose LESS THAN 70 milliGRAM(s)/deciliter  glucagon  Injectable 1 milliGRAM(s) IntraMuscular once PRN Glucose LESS THAN 70 milligrams/deciliter    Allergies    No Known Allergies    Intolerances        VITAL SIGNS:  Vital Signs Last 24 Hrs  T(C): 37.2 (23 Feb 2020 12:50), Max: 37.3 (23 Feb 2020 00:51)  T(F): 98.9 (23 Feb 2020 12:50), Max: 99.2 (23 Feb 2020 00:51)  HR: 64 (23 Feb 2020 11:00) (57 - 106)  BP: 137/66 (23 Feb 2020 11:00) (101/65 - 138/62)  BP(mean): 95 (23 Feb 2020 11:00) (77 - 99)  RR: 19 (23 Feb 2020 11:00) (12 - 25)  SpO2: 97% (23 Feb 2020 11:00) (94% - 99%)      02-22-20 @ 07:01  -  02-23-20 @ 07:00  --------------------------------------------------------  IN: 580 mL / OUT: 300 mL / NET: 280 mL    02-23-20 @ 07:01  -  02-23-20 @ 13:00  --------------------------------------------------------  IN: 0 mL / OUT: 100 mL / NET: -100 mL        PHYSICAL EXAM:  General: NAD, Laying comfortably in bed  HEENT: NC/AT, anicteric sclera, MMM  Neck: supple, RIJ in place  Cardiovascular: +S1/S2, RRR, No murmurs, rubs, gallops  Respiratory: Decreased breath sounds throughout, no wheezing or crackles  Gastrointestinal: soft, NT/ND, +BSx4  Extremities: WWP, no edema, clubbing or cyanosis  Vascular: 2+ radial, DP/PT pulses B/L  Neurological: AAOx2, no focal deficits      LABS:                        10.9   12.03 )-----------( 299      ( 23 Feb 2020 04:42 )             36.4     02-23    150<H>  |  113<H>  |  30<H>  ----------------------------<  148<H>  4.0   |  24  |  0.97    Ca    9.2      23 Feb 2020 04:42  Phos  3.5     02-23  Mg     2.3     02-23    TPro  6.7  /  Alb  2.7<L>  /  TBili  0.4  /  DBili  x   /  AST  23  /  ALT  30  /  AlkPhos  65  02-22        CAPILLARY BLOOD GLUCOSE  POCT Blood Glucose.: 148 mg/dL (23 Feb 2020 11:21)  POCT Blood Glucose.: 167 mg/dL (23 Feb 2020 07:26)  POCT Blood Glucose.: 132 mg/dL (22 Feb 2020 22:34)  POCT Blood Glucose.: 211 mg/dL (22 Feb 2020 17:17)          RADIOLOGY & ADDITIONAL TESTS: Reviewed.

## 2020-02-24 DIAGNOSIS — K92.2 GASTROINTESTINAL HEMORRHAGE, UNSPECIFIED: ICD-10-CM

## 2020-02-24 DIAGNOSIS — E11.9 TYPE 2 DIABETES MELLITUS WITHOUT COMPLICATIONS: ICD-10-CM

## 2020-02-24 DIAGNOSIS — F03.90 UNSPECIFIED DEMENTIA WITHOUT BEHAVIORAL DISTURBANCE: ICD-10-CM

## 2020-02-24 DIAGNOSIS — E87.0 HYPEROSMOLALITY AND HYPERNATREMIA: ICD-10-CM

## 2020-02-24 DIAGNOSIS — D72.829 ELEVATED WHITE BLOOD CELL COUNT, UNSPECIFIED: ICD-10-CM

## 2020-02-24 DIAGNOSIS — R63.8 OTHER SYMPTOMS AND SIGNS CONCERNING FOOD AND FLUID INTAKE: ICD-10-CM

## 2020-02-24 DIAGNOSIS — I21.4 NON-ST ELEVATION (NSTEMI) MYOCARDIAL INFARCTION: ICD-10-CM

## 2020-02-24 DIAGNOSIS — I47.2 VENTRICULAR TACHYCARDIA: ICD-10-CM

## 2020-02-24 DIAGNOSIS — I50.20 UNSPECIFIED SYSTOLIC (CONGESTIVE) HEART FAILURE: ICD-10-CM

## 2020-02-24 LAB
ANION GAP SERPL CALC-SCNC: 13 MMOL/L — SIGNIFICANT CHANGE UP (ref 5–17)
BUN SERPL-MCNC: 39 MG/DL — HIGH (ref 7–23)
CALCIUM SERPL-MCNC: 8.9 MG/DL — SIGNIFICANT CHANGE UP (ref 8.4–10.5)
CHLORIDE SERPL-SCNC: 115 MMOL/L — HIGH (ref 96–108)
CO2 SERPL-SCNC: 23 MMOL/L — SIGNIFICANT CHANGE UP (ref 22–31)
CREAT SERPL-MCNC: 1.09 MG/DL — SIGNIFICANT CHANGE UP (ref 0.5–1.3)
GLUCOSE BLDC GLUCOMTR-MCNC: 137 MG/DL — HIGH (ref 70–99)
GLUCOSE BLDC GLUCOMTR-MCNC: 139 MG/DL — HIGH (ref 70–99)
GLUCOSE BLDC GLUCOMTR-MCNC: 149 MG/DL — HIGH (ref 70–99)
GLUCOSE BLDC GLUCOMTR-MCNC: 160 MG/DL — HIGH (ref 70–99)
GLUCOSE SERPL-MCNC: 145 MG/DL — HIGH (ref 70–99)
HCT VFR BLD CALC: 37.4 % — LOW (ref 39–50)
HGB BLD-MCNC: 11.1 G/DL — LOW (ref 13–17)
MAGNESIUM SERPL-MCNC: 2.5 MG/DL — SIGNIFICANT CHANGE UP (ref 1.6–2.6)
MCHC RBC-ENTMCNC: 28.7 PG — SIGNIFICANT CHANGE UP (ref 27–34)
MCHC RBC-ENTMCNC: 29.7 GM/DL — LOW (ref 32–36)
MCV RBC AUTO: 96.6 FL — SIGNIFICANT CHANGE UP (ref 80–100)
NRBC # BLD: 0 /100 WBCS — SIGNIFICANT CHANGE UP (ref 0–0)
PHOSPHATE SERPL-MCNC: 3.6 MG/DL — SIGNIFICANT CHANGE UP (ref 2.5–4.5)
PLATELET # BLD AUTO: 322 K/UL — SIGNIFICANT CHANGE UP (ref 150–400)
POTASSIUM SERPL-MCNC: 4.2 MMOL/L — SIGNIFICANT CHANGE UP (ref 3.5–5.3)
POTASSIUM SERPL-SCNC: 4.2 MMOL/L — SIGNIFICANT CHANGE UP (ref 3.5–5.3)
RBC # BLD: 3.87 M/UL — LOW (ref 4.2–5.8)
RBC # FLD: 14.8 % — HIGH (ref 10.3–14.5)
SODIUM SERPL-SCNC: 151 MMOL/L — HIGH (ref 135–145)
WBC # BLD: 12.25 K/UL — HIGH (ref 3.8–10.5)
WBC # FLD AUTO: 12.25 K/UL — HIGH (ref 3.8–10.5)

## 2020-02-24 PROCEDURE — 99223 1ST HOSP IP/OBS HIGH 75: CPT | Mod: 25

## 2020-02-24 PROCEDURE — 71045 X-RAY EXAM CHEST 1 VIEW: CPT | Mod: 26

## 2020-02-24 PROCEDURE — 99291 CRITICAL CARE FIRST HOUR: CPT

## 2020-02-24 PROCEDURE — 93010 ELECTROCARDIOGRAM REPORT: CPT

## 2020-02-24 PROCEDURE — 99292 CRITICAL CARE ADDL 30 MIN: CPT

## 2020-02-24 RX ORDER — ENOXAPARIN SODIUM 100 MG/ML
30 INJECTION SUBCUTANEOUS EVERY 24 HOURS
Refills: 0 | Status: DISCONTINUED | OUTPATIENT
Start: 2020-02-24 | End: 2020-02-28

## 2020-02-24 RX ORDER — FUROSEMIDE 40 MG
10 TABLET ORAL DAILY
Refills: 0 | Status: DISCONTINUED | OUTPATIENT
Start: 2020-02-24 | End: 2020-02-27

## 2020-02-24 RX ADMIN — LATANOPROST 1 DROP(S): 0.05 SOLUTION/ DROPS OPHTHALMIC; TOPICAL at 21:46

## 2020-02-24 RX ADMIN — Medication 81 MILLIGRAM(S): at 11:56

## 2020-02-24 RX ADMIN — Medication 3 MILLILITER(S): at 10:14

## 2020-02-24 RX ADMIN — CHLORHEXIDINE GLUCONATE 1 APPLICATION(S): 213 SOLUTION TOPICAL at 06:47

## 2020-02-24 RX ADMIN — ATORVASTATIN CALCIUM 40 MILLIGRAM(S): 80 TABLET, FILM COATED ORAL at 21:44

## 2020-02-24 RX ADMIN — Medication 10 MILLIGRAM(S): at 12:01

## 2020-02-24 RX ADMIN — Medication 2: at 21:44

## 2020-02-24 RX ADMIN — Medication 3 MILLILITER(S): at 21:44

## 2020-02-24 RX ADMIN — FINASTERIDE 5 MILLIGRAM(S): 5 TABLET, FILM COATED ORAL at 11:56

## 2020-02-24 RX ADMIN — POLYETHYLENE GLYCOL 3350 17 GRAM(S): 17 POWDER, FOR SOLUTION ORAL at 06:47

## 2020-02-24 RX ADMIN — Medication 3 MILLILITER(S): at 05:37

## 2020-02-24 RX ADMIN — PANTOPRAZOLE SODIUM 40 MILLIGRAM(S): 20 TABLET, DELAYED RELEASE ORAL at 06:47

## 2020-02-24 RX ADMIN — ENOXAPARIN SODIUM 30 MILLIGRAM(S): 100 INJECTION SUBCUTANEOUS at 12:02

## 2020-02-24 NOTE — CONSULT NOTE ADULT - SUBJECTIVE AND OBJECTIVE BOX
Electrophysiology Consult Note:     CHIEF COMPLAINT:  Patient is a 87y old  Male who presents with a chief complaint of Cardiogenic shock, GIB, NSTEMI (2020 14:00)        HISTORY OF PRESENT ILLNESS:   87M PMH CAD (CABG 10y ago, 2/3 grafts occluded), dementia (AAOx1, DM-II, presenting to [facility] with AMS (more confused than baseline per wife and HHA), found with melanotic BM and elevated troponins (11.2) concerning for NSTEMI (EKG w/right bundle, ST depressions in anterolateral leads) vs demand ischemia 2/2 GI bleed; transferred to [facility] CCU for further care on 2/10. Echo demonstrated EF of 25%. Dobutamine gtt was initiated for cardiogenic shock/hypovolemic shock. Cardiac enzymes continued to uptrend; however management of NSTEMI complicated by continued melanotic BM’s requiring pRBC transfusion (total 2 units). Patient was intubated for C-scope and EGD, which did not identify source of bleeding; incidental duodenal mass visualized; pathology of mass remains unknown; extubated s/p scopes. Dobutamine gtt was discontinued. Initial attempt to initiate ASA, Plavix, heparin gtt was complicated by Hgb drop necessitating discontinuation of NSTEMI medical management. Upon continued monitoring of Hgb, ASA was initiated; tolerated well. Cardiac intervention was not pursued given inability to tolerate required post-procedure anticoagulation/DAPT.  Pt was started on Amio on  and Lopressor 12.5 mg bid on .  These were stopped because concerned for polymorphic VT episodes (non-substained) in setting of prolonged QTc.       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       SOCIAL HISTORY:    , lives with wife, has HHA  Non ETOH user, non rec drug user    Allergies    No Known Allergies    Intolerances    	    MEDICATIONS:  MEDICATIONS  (STANDING):  albuterol/ipratropium for Nebulization. 3 milliLiter(s) Nebulizer every 6 hours  aspirin  chewable 81 milliGRAM(s) Oral daily  atorvastatin 40 milliGRAM(s) Oral at bedtime  chlorhexidine 2% Cloths 1 Application(s) Topical <User Schedule>  dextrose 5%. 1000 milliLiter(s) (50 mL/Hr) IV Continuous <Continuous>  dextrose 50% Injectable 12.5 Gram(s) IV Push once  dextrose 50% Injectable 25 Gram(s) IV Push once  dextrose 50% Injectable 25 Gram(s) IV Push once  enoxaparin Injectable 30 milliGRAM(s) SubCutaneous every 24 hours  finasteride 5 milliGRAM(s) Oral daily  furosemide    Tablet 10 milliGRAM(s) Oral daily  insulin lispro (HumaLOG) corrective regimen sliding scale   SubCutaneous Before meals and at bedtime  latanoprost 0.005% Ophthalmic Solution 1 Drop(s) Right EYE at bedtime  pantoprazole    Tablet 40 milliGRAM(s) Oral before breakfast  polyethylene glycol 3350 17 Gram(s) Oral two times a day    MEDICATIONS  (PRN):  dextrose 40% Gel 15 Gram(s) Oral once PRN Blood Glucose LESS THAN 70 milliGRAM(s)/deciliter  glucagon  Injectable 1 milliGRAM(s) IntraMuscular once PRN Glucose LESS THAN 70 milligrams/deciliter        REVIEW OF SYSTEMS:  Unable to obtain as pt is AAO 1.  Not answering questions during interview.   Shakes head when asked if have pain.       PHYSICAL EXAM:  Vital Signs Last 24 Hrs  T(C): 37.6 (2020 13:19), Max: 37.6 (2020 13:19)  T(F): 99.6 (2020 13:19), Max: 99.6 (2020 13:19)  HR: 62 (2020 16:23) (62 - 88)  BP: 122/59 (2020 16:23) (100/55 - 146/103)  BP(mean): 85 (2020 16:23) (73 - 115)  RR: 15 (2020 16:23) (14 - 34)  SpO2: 98% (2020 16:23) (92% - 98%)  Daily Weight in k.7 (2020 05:19)    Constitutional: NAD	  HEENT:   Normal oral mucosa, PERRL, EOMI	  Neck: Right IJ line   CVS: Normal S1 / S2, RRR, No murmurs  Pulm: CTA. No wheeze or rale  GI:  + BS, soft, NT / ND   Ext: No LE edema  Vascular: Peripheral pulses palpable 2+ bilaterally  MS: full range of motion in all joints  Neurologic: A&O x 1   Psych: flat affect  Skin: No rash or lesion       	  LABS:	                         11.1   12.25 )-----------( 322      ( 2020 05:21 )             37.4     02-24    151<H>  |  115<H>  |  39<H>  ----------------------------<  145<H>  4.2   |  23  |  1.09    Ca    8.9      2020 05:21  Phos  3.6     02-  Mg     2.5           EK20: NSR HR 71 bpm. RBBB.  Interpolated   Telemetry:     Echo: Electrophysiology Consult Note:     CHIEF COMPLAINT:  Patient is a 87y old  Male who presents with a chief complaint of Cardiogenic shock, GIB, NSTEMI (2020 14:00)        HISTORY OF PRESENT ILLNESS:   87M PMH CAD (CABG 10y ago, 2/3 grafts occluded), dementia (AAOx1, DM-II, presenting to [facility] [facility] with AMS (more confused than baseline per wife and HHA), found with melanotic BM and elevated troponins (11.2) concerning for NSTEMI (EKG w/right bundle, ST depressions in anterolateral leads) vs demand ischemia 2/2 GI bleed; transferred to [facility] CCU for further care on 2/10. Echo demonstrated EF of 25%. Dobutamine gtt was initiated for cardiogenic shock/hypovolemic shock. Cardiac enzymes continued to uptrend; however management of NSTEMI complicated by continued melanotic BM’s requiring pRBC transfusion (total 2 units). Patient was intubated for C-scope and EGD, which did not identify source of bleeding; incidental duodenal mass visualized; pathology of mass remains unknown; extubated s/p scopes. Dobutamine gtt was discontinued. Initial attempt to initiate ASA, Plavix, heparin gtt was complicated by Hgb drop necessitating discontinuation of NSTEMI medical management. Upon continued monitoring of Hgb, ASA was initiated; tolerated well. Cardiac intervention was not pursued given inability to tolerate required post-procedure anticoagulation/DAPT.  Pt was started on Amio on  and Lopressor 12.5 mg bid on .  These were stopped because concerned for polymorphic VT episodes (non-substained) in setting of prolonged QTc.       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       SOCIAL HISTORY:    , lives with wife, has HHA  Non ETOH user, non rec drug user    Allergies    No Known Allergies    Intolerances    	    MEDICATIONS:  MEDICATIONS  (STANDING):  albuterol/ipratropium for Nebulization. 3 milliLiter(s) Nebulizer every 6 hours  aspirin  chewable 81 milliGRAM(s) Oral daily  atorvastatin 40 milliGRAM(s) Oral at bedtime  chlorhexidine 2% Cloths 1 Application(s) Topical <User Schedule>  dextrose 5%. 1000 milliLiter(s) (50 mL/Hr) IV Continuous <Continuous>  dextrose 50% Injectable 12.5 Gram(s) IV Push once  dextrose 50% Injectable 25 Gram(s) IV Push once  dextrose 50% Injectable 25 Gram(s) IV Push once  enoxaparin Injectable 30 milliGRAM(s) SubCutaneous every 24 hours  finasteride 5 milliGRAM(s) Oral daily  furosemide    Tablet 10 milliGRAM(s) Oral daily  insulin lispro (HumaLOG) corrective regimen sliding scale   SubCutaneous Before meals and at bedtime  latanoprost 0.005% Ophthalmic Solution 1 Drop(s) Right EYE at bedtime  pantoprazole    Tablet 40 milliGRAM(s) Oral before breakfast  polyethylene glycol 3350 17 Gram(s) Oral two times a day    MEDICATIONS  (PRN):  dextrose 40% Gel 15 Gram(s) Oral once PRN Blood Glucose LESS THAN 70 milliGRAM(s)/deciliter  glucagon  Injectable 1 milliGRAM(s) IntraMuscular once PRN Glucose LESS THAN 70 milligrams/deciliter        REVIEW OF SYSTEMS:  Unable to obtain as pt is AAO 1.  Not answering questions during interview.   Shakes head when asked if have pain.       PHYSICAL EXAM:  Vital Signs Last 24 Hrs  T(C): 37.6 (2020 13:19), Max: 37.6 (2020 13:19)  T(F): 99.6 (2020 13:19), Max: 99.6 (2020 13:19)  HR: 62 (2020 16:23) (62 - 88)  BP: 122/59 (2020 16:23) (100/55 - 146/103)  BP(mean): 85 (2020 16:23) (73 - 115)  RR: 15 (2020 16:23) (14 - 34)  SpO2: 98% (2020 16:23) (92% - 98%)  Daily Weight in k.7 (2020 05:19)    Constitutional: NAD	  HEENT:   Normal oral mucosa, PERRL, EOMI	  Neck: Right IJ line   CVS: Normal S1 / S2, RRR, No murmurs  Pulm: CTA. No wheeze or rale  GI:  + BS, soft, NT / ND   Ext: No LE edema  Vascular: Peripheral pulses palpable 2+ bilaterally  MS: full range of motion in all joints  Neurologic: A&O x 1   Psych: flat affect  Skin: No rash or lesion       	  LABS:	                         11.1   12.25 )-----------( 322      ( 2020 05:21 )             37.4     02-    151<H>  |  115<H>  |  39<H>  ----------------------------<  145<H>  4.2   |  23  |  1.09    Ca    8.9      2020 05:21  Phos  3.6       Mg     2.5           EK20: NSR HR 71 bpm. RBBB.  Interpolated PVC.   Telemetry: 2 episodes of PMVT on .     TTE Echo w/ Cont Complete (02.10.20 @ 10:58) >     1. Severe global hypokinesis of the left ventricle with an estimated left ventricular ejection fraction of 25%.Mild Left ventricular hypertrophy present. No left ventricular thrombus noted.   2. The right ventricle is normal in size. Right ventricular systolic function is normal.   3. The aortic valve is mildly thickened.   4. There is mild mitral annular calcification. There is mild-to-moderate mitral regurgitation.   5. There is mild tricuspid regurgitation.   6. There is no echocardiographic evidence of pulmonary hypertension.   7. There is mild pulmonic regurgitation.   8. No pericardial effusion is seen.

## 2020-02-24 NOTE — PROGRESS NOTE ADULT - ASSESSMENT
87M  PMH CAD s/p CABG, HTN, HLD, DM, advanced dementia presenting with GI bleed and NSTEMI c/b cardiogenic shock, admitted to CCU for further monitoring and management.    CARDIOVASCULAR:  #CAD; known history of CAD s/p CABG (2003) with 2/3 grafts occluded as of 2009; poa with ACS vs Type II MI in setting of GI Bleed; troponins trended to peak.   Given significant GI bleed and unclear source despite EGD/C-scope, plan for conservative management of CAD as below:  -ASA 81mg qd  -Lipitor 40mg qhs  -Hold beta blocker in setting of recent VTach episodes    #HFrEF; EF 25% w/ global hypokinesis   lungs CTA b/l, no LE edema, breathing comfortably on room air  am CXR with improvement of congestion, stable from yesterday  -Lasix 20mg IV x1 today; will consider starting Lasix 40mg PO qd   -strict I/O's, daily weights; monitor clinical volume status; daily CXR  -continue to hold BB and lisinopril    PULM:  #Pleural effusions 2/2 HFrEF  patient continues to improve clinically  -Lasix 20mg IV x1 today; will consider starting Lasix 40mg PO qd tomorrow  -breathing comfortably on room air  -daily CXR    GI:  #GI Bleed - s/p EGD/C-scope without confirmed source of bleed; Hgb remains stable, currently without s/s active bleed  currently tolerating Aspirin 81mg qd   -pantoprazole 40mg po bid  -monitor CBC  -maintain peripheral access    RENAL  #Hypernatremia, asymptomatic  -continue to monitor in setting of continued diuresis  -avoiding IVF in setting of fluid overload as above, slow administration of 250cc D5W if Na increasing  -encourage PO fluid intake    NEURO  #Dementia; baseline AAOx1-2; patient appears to be mentating near baseline; waxes and wanes throughout the day  Home regimen: memantine and donepezil  -continue home memantine  -continue trial of donepezil discontinuation  -aspiration precautions  -S/S eval: pureed diet with thin liquids    ENDOCRINE:  #DMII; A1C 6.7 on admission; home regimen includes metformin 500mg bid   -mISS    F: none; encourage PO intake  E: replete prn  N: pureed DASH/CC diet with thin liquids; aspiration precautions  DVT PPx: SCDs  PT: OOB to chair / PT eval: VALERIE    Dispo: CCU  Code status: DNR/DNI; patient with overall poor prognosis 87M  PMH CAD s/p CABG, HTN, HLD, DM, advanced dementia presenting with GI bleed and NSTEMI c/b cardiogenic shock, admitted to CCU for further monitoring and management.    CARDIOVASCULAR:  #CAD; known history of CAD s/p CABG (2003) with 2/3 grafts occluded as of 2009; poa with ACS vs Type II MI in setting of GI Bleed; troponins trended to peak.   Given significant GI bleed and unclear source despite EGD/C-scope, plan for conservative management of CAD as below:  -ASA 81mg qd  -Lipitor 40mg qhs  -hold beta blocker in setting of recent VTach episodes    #HFrEF; EF 25% w/ global hypokinesis   lungs CTA b/l, no LE edema, breathing comfortably on room air  am CXR with improvement of congestion, stable from yesterday  -Lasix 20mg IV x1 today; will consider starting Lasix 40mg PO qd   -strict I/O's, daily weights; monitor clinical volume status; daily CXR  -continue to hold BB and lisinopril    PULM:  #Pleural effusions 2/2 HFrEF  patient continues to improve clinically  -Lasix 20mg IV x1 today; will consider starting Lasix 40mg PO qd tomorrow  -breathing comfortably on room air  -daily CXR    GI:  #GI Bleed - s/p EGD/C-scope without confirmed source of bleed; Hgb remains stable, currently without s/s active bleed  currently tolerating Aspirin 81mg qd   -pantoprazole 40mg po qd vs bid; concern for QTc prolongation  -monitor CBC  -maintain peripheral access    RENAL  #Hypernatremia, asymptomatic  -continue to monitor in setting of continued diuresis  -avoiding IVF in setting of fluid overload as above, slow administration of 250cc D5W if Na increasing  -encourage PO fluid intake    NEURO  #Dementia; baseline AAOx1-2; patient appears to be mentating near baseline; waxes and wanes throughout the day  Home regimen: memantine and donepezil  -holding memantine given concern for QTc prolongation  -holding donepezil in setting of lack of clinical benefit  -aspiration precautions  -S/S eval: pureed diet with thin liquids    ENDOCRINE:  #DMII; A1C 6.7 on admission; home regimen includes metformin 500mg bid   -mISS    F: none; encourage PO intake  E: replete prn  N: pureed DASH/CC diet with thin liquids; aspiration precautions  DVT PPx: SCDs  PT: OOB to chair / PT eval: VALERIE    Dispo: CCU  Code status: DNR/DNI; patient with overall poor prognosis 87M  PMH CAD s/p CABG, HTN, HLD, DM, advanced dementia presenting with GI bleed and NSTEMI c/b cardiogenic shock, admitted to CCU for further monitoring and management.    CARDIOVASCULAR:  #CAD; known history of CAD s/p CABG (2003) with 2/3 grafts occluded as of 2009; poa with ACS vs Type II MI in setting of GI Bleed; troponins trended to peak.   Given significant GI bleed and unclear source despite EGD/C-scope, plan for conservative management of CAD as below:  -ASA 81mg qd  -Lipitor 40mg qhs  -hold beta blocker in setting of recent VTach episodes    #HFrEF; EF 25% w/ global hypokinesis   lungs CTA b/l, no LE edema, breathing comfortably on room air  am CXR with improvement of congestion, stable from yesterday  -Lasix 20mg IV x1 today; will consider starting Lasix 40mg PO qd   -strict I/O's, daily weights; monitor clinical volume status; daily CXR  -continue to hold BB and lisinopril    #polymorphic ventricular tachycardia in setting of beta-blockade and QTc prolongation:  multiple episodes of self-resolving polymorphic VTach  -hold BB  -continue to monitor    PULM:  #Pleural effusions 2/2 HFrEF  patient continues to improve clinically  -Lasix 20mg IV x1 today; will consider starting Lasix 40mg PO qd tomorrow  -breathing comfortably on room air  -daily CXR    GI:  #GI Bleed - s/p EGD/C-scope without confirmed source of bleed; Hgb remains stable, currently without s/s active bleed  currently tolerating Aspirin 81mg qd   -pantoprazole 40mg po qd vs bid; concern for QTc prolongation  -monitor CBC  -maintain peripheral access    RENAL  #Hypernatremia, asymptomatic  -continue to monitor in setting of continued diuresis  -avoiding IVF in setting of fluid overload as above, slow administration of 250cc D5W if Na increasing  -encourage PO fluid intake    NEURO  #Dementia; baseline AAOx1-2; patient appears to be mentating near baseline; waxes and wanes throughout the day  Home regimen: memantine and donepezil  -holding memantine given concern for QTc prolongation  -holding donepezil in setting of lack of clinical benefit  -aspiration precautions  -S/S eval: pureed diet with thin liquids    ENDOCRINE:  #DMII; A1C 6.7 on admission; home regimen includes metformin 500mg bid   -mISS    F: none; encourage PO intake  E: replete prn  N: pureed DASH/CC diet with thin liquids; aspiration precautions  DVT PPx: SCDs  PT: OOB to chair / PT mykelal: VALERIE    Dispo: CCU  Code status: DNR/DNI; patient with overall poor prognosis 87M  PMH CAD s/p CABG, HTN, HLD, DM, advanced dementia presenting with GI bleed and NSTEMI c/b cardiogenic shock, admitted to CCU for further monitoring and management. Patient remains hemodynamically stable without pressor support, remains without further episodes of GI bleed, NSTEMI managed medically, euvolemic, deemed stable for stepdown to telemetry unit.    CARDIOVASCULAR:  #CAD; known history of CAD s/p CABG (2003) with 2/3 grafts occluded as of 2009; poa with ACS vs Type II MI in setting of GI Bleed; troponins trended to peak.   Given significant GI bleed and unclear source despite EGD/C-scope, plan for conservative management of CAD as below:  -ASA 81mg qd  -Lipitor 40mg qhs  -hold beta blocker in setting of recent VTach episodes    #HFrEF; EF 25% w/ global hypokinesis   lungs CTA b/l, no LE edema, breathing comfortably on room air  am CXR with improvement of congestion, stable from yesterday  -start Lasix 10mg po qd today  -strict I/O's, daily weights; monitor clinical volume status; daily CXR  -continue to hold BB in setting of VTach episodes  -continue to hold lisinopril in setting of tenuous BP    #polymorphic ventricular tachycardia in setting of beta-blockade and QTc prolongation:  multiple episodes of self-resolving polymorphic VTach s/p beta blocker initiation  -hold BB  -discontinued QTc-prolonging medications including memantine and Protonix BID-->qd  -will consider reinitiating Protonix BID per GI/EP recs  -EP consulted; f/u recs  -continue to monitor    PULM:  #Pleural effusions 2/2 HFrEF  patient continues to improve clinically  -Lasix 10mg po qd  -breathing comfortably on room air  -daily CXR    GI:  #GI Bleed - s/p EGD/C-scope without confirmed source of bleed; Hgb remains stable, currently without s/s active bleed  currently tolerating Aspirin 81mg qd   -pantoprazole 40mg po qd vs bid; concern for QTc prolongation as above  -monitor CBC  -maintain peripheral access    RENAL  #Hypernatremia, asymptomatic  -continue to monitor in setting of continued diuresis  -encourage PO fluid intake  -avoiding IVF in setting of fluid overload as above, slow administration of 250cc D5W if Na increasing    NEURO  #Dementia; baseline AAOx1-2; patient appears to be mentating near baseline; waxes and wanes throughout the day  Home regimen: memantine and donepezil  -holding memantine given concern for QTc prolongation  -holding donepezil in setting of lack of clinical benefit  -aspiration precautions  -S/S eval: pureed diet with thin liquids    ENDOCRINE:  #DMII; A1C 6.7 on admission; home regimen includes metformin 500mg bid   -mISS    F: none; encourage PO intake  E: replete prn  N: pureed DASH/CC diet with thin liquids; aspiration precautions  DVT PPx: SCDs  PT: OOB to chair / PT eval: VALERIE    Dispo: CCU; stepdown to cardiac telemetry   Code status: DNR/DNI; patient with overall poor prognosis 87M  PMH CAD s/p CABG, HTN, HLD, DM, advanced dementia presenting with GI bleed and NSTEMI c/b cardiogenic shock, admitted to CCU for further monitoring and management. Patient remains hemodynamically stable without pressor support, remains without further episodes of GI bleed, NSTEMI managed medically, euvolemic, deemed stable for stepdown to telemetry unit.    CARDIOVASCULAR:  #CAD; known history of CAD s/p CABG (2003) with 2/3 grafts occluded as of 2009; poa with ACS vs Type II MI in setting of GI Bleed; troponins trended to peak.   Given significant GI bleed and unclear source despite EGD/C-scope, plan for conservative management of CAD as below:  -ASA 81mg qd  -Lipitor 40mg qhs  -hold beta blocker in setting of recent VTach episodes    #HFrEF; EF 25% w/ global hypokinesis   lungs CTA b/l, no LE edema, breathing comfortably on room air  am CXR with improvement of congestion, stable from yesterday  -start Lasix 10mg po qd today  -strict I/O's, daily weights; monitor clinical volume status; daily CXR  -continue to hold BB in setting of VTach episodes  -continue to hold lisinopril in setting of tenuous BP    #polymorphic ventricular tachycardia in setting of beta-blockade and QTc prolongation:  multiple episodes of self-resolving polymorphic VTach s/p beta blocker initiation  -hold BB  -discontinued QTc-prolonging medications including memantine and Protonix BID-->qd  -will consider reinitiating Protonix BID per GI/EP recs  -EP consulted; f/u recs  -continue to monitor    PULM:  #Pleural effusions 2/2 HFrEF  patient continues to improve clinically  -Lasix 10mg po qd  -breathing comfortably on room air  -daily CXR    GI:  #GI Bleed - s/p EGD/C-scope without confirmed source of bleed; Hgb remains stable, currently without s/s active bleed  currently tolerating Aspirin 81mg qd   -pantoprazole 40mg po qd vs bid; concern for QTc prolongation as above  -monitor CBC  -maintain peripheral access    RENAL  #Hypernatremia, asymptomatic  -continue to monitor in setting of continued diuresis  -encourage PO fluid intake  -avoiding IVF in setting of fluid overload as above, slow administration of 250cc D5W if Na increasing    NEURO  #Dementia; baseline AAOx1-2; patient appears to be mentating near baseline; waxes and wanes throughout the day  Home regimen: memantine and donepezil  -holding memantine given concern for QTc prolongation  -holding donepezil in setting of lack of clinical benefit  -aspiration precautions  -S/S eval: pureed diet with thin liquids    ENDOCRINE:  #DMII; A1C 6.7 on admission; home regimen includes metformin 500mg bid   -mISS    F: none; encourage PO intake  E: replete prn  N: pureed DASH/CC diet with thin liquids; aspiration precautions  DVT PPx: Lovenox 30, SCDs  PT: OOB to chair / PT eval: VALERIE    Dispo: CCU; stepdown to cardiac telemetry   Code status: DNR/DNI; patient with overall poor prognosis

## 2020-02-24 NOTE — PROGRESS NOTE ADULT - ASSESSMENT
87 y/oM  PMH CAD s/p CABG, HTN, HLD, DM, advanced dementia presented with GI bleed and NSTEMI c/b cardiogenic shock, now off pressure support without further melanotic episodes, stepped down to tele [facility] for further mgmt.

## 2020-02-24 NOTE — PROGRESS NOTE ADULT - SUBJECTIVE AND OBJECTIVE BOX
CARDIOLOGY NP PROGRESS/TRANSFER ACCEPTANCE  NOTE    HOSPITAL COURSE:   87M PMH CAD (CABG 10y ago, 2/3 grafts occluded), dementia (AAOx2), DM-II, presenting to [place] with AMS, found with melanotic BM and elevated troponins concerning for NSTEMI vs demand ischemia 2/2 GI bleed; transferred to [facility] CCU for further care. Echo demonstrated EF of 25%. Dobutamine gtt was initiated for cardiogenic shock/hypovolemic shock. Cardiac enzymes continued to uptrend; however management of NSTEMI complicated by continued melanotic BM’s requiring pRBC transfusion. Patient was intubated for C-scope and EGD, which did not identify source of bleeding; incidental duodenal mass visualized; pathology of mass remains unknown; extubated s/p scopes. Dobutamine gtt was discontinued. Initial attempt to initiate ASA, Plavix, heparin gtt was complicated by Hgb drop necessitating discontinuation of NSTEMI medical management. Upon continued monitoring of Hgb, ASA was initiated; tolerated well. Cardiac intervention was not pursued given inability to tolerate required post-procedure anticoagulation/DAPT; CCTA was not pursued given inability to tolerate beta blockade. For HFrEF, patient demonstrating improvement in fluid status with Lasix IV dosed per clinical presentation; diuresis complicated by asymptomatic hypernatremia now resolving; diuresis to continue with Lasix 10mg PO qd. Addition of beta blocker complicated by episodes of polymorphic ventricular tachycardia; holding beta blocker; EP consulted. Patient medically stable for stepdown.    Subjective:   Remainder ROS otherwise negative.    Overnight Events:     TELEMETRY:    EKG:      VITAL SIGNS:  T(C): 37.6 (02-24-20 @ 13:19), Max: 37.6 (02-24-20 @ 13:19)  HR: 75 (02-24-20 @ 12:00) (62 - 79)  BP: 136/71 (02-24-20 @ 12:00) (100/55 - 145/70)  RR: 33 (02-24-20 @ 12:00) (14 - 33)  SpO2: 98% (02-24-20 @ 12:00) (92% - 100%)  Wt(kg): --    I&O's Summary    23 Feb 2020 07:01  -  24 Feb 2020 07:00  --------------------------------------------------------  IN: 552.5 mL / OUT: 850 mL / NET: -297.5 mL    24 Feb 2020 07:01  -  24 Feb 2020 14:09  --------------------------------------------------------  IN: 237 mL / OUT: 150 mL / NET: 87 mL          PHYSICAL EXAM:    General: A/ox 3, No acute Distress  Neck: Supple, NO JVD  Cardiac: S1 S2, No M/R/G  Pulmonary: CTAB, Breathing unlabored, No Rhonchi/Rales/Wheezing  Abdomen: Soft, Non -tender, +BS x 4 quads  Extremities: No Rashes, No edema  Neuro: A/o x 3, No focal deficits          LABS:                          11.1   12.25 )-----------( 322      ( 24 Feb 2020 05:21 )             37.4                              02-24    151<H>  |  115<H>  |  39<H>  ----------------------------<  145<H>  4.2   |  23  |  1.09    Ca    8.9      24 Feb 2020 05:21  Phos  3.6     02-24  Mg     2.5     02-24                                CAPILLARY BLOOD GLUCOSE      POCT Blood Glucose.: 149 mg/dL (24 Feb 2020 10:51)  POCT Blood Glucose.: 137 mg/dL (24 Feb 2020 06:51)  POCT Blood Glucose.: 174 mg/dL (23 Feb 2020 21:36)  POCT Blood Glucose.: 168 mg/dL (23 Feb 2020 17:31)            Allergies:  No Known Allergies    MEDICATIONS  (STANDING):  albuterol/ipratropium for Nebulization. 3 milliLiter(s) Nebulizer every 6 hours  aspirin  chewable 81 milliGRAM(s) Oral daily  atorvastatin 40 milliGRAM(s) Oral at bedtime  chlorhexidine 2% Cloths 1 Application(s) Topical <User Schedule>  dextrose 5%. 1000 milliLiter(s) (50 mL/Hr) IV Continuous <Continuous>  dextrose 50% Injectable 12.5 Gram(s) IV Push once  dextrose 50% Injectable 25 Gram(s) IV Push once  dextrose 50% Injectable 25 Gram(s) IV Push once  enoxaparin Injectable 30 milliGRAM(s) SubCutaneous every 24 hours  finasteride 5 milliGRAM(s) Oral daily  furosemide    Tablet 10 milliGRAM(s) Oral daily  insulin lispro (HumaLOG) corrective regimen sliding scale   SubCutaneous Before meals and at bedtime  latanoprost 0.005% Ophthalmic Solution 1 Drop(s) Right EYE at bedtime  pantoprazole    Tablet 40 milliGRAM(s) Oral before breakfast  polyethylene glycol 3350 17 Gram(s) Oral two times a day    MEDICATIONS  (PRN):  dextrose 40% Gel 15 Gram(s) Oral once PRN Blood Glucose LESS THAN 70 milliGRAM(s)/deciliter  glucagon  Injectable 1 milliGRAM(s) IntraMuscular once PRN Glucose LESS THAN 70 milligrams/deciliter        DIAGNOSTIC TESTS: CARDIOLOGY NP PROGRESS/TRANSFER ACCEPTANCE  NOTE    HOSPITAL COURSE:   87M PMH CAD (CABG 10y ago, 2/3 grafts occluded), dementia (AAOx2), DM-II, presenting to [place] 2/9/20 with AMS (more confused than baseline per wife and HHA), found with melanotic BM and elevated troponins (11.2) concerning for NSTEMI (EKG w/right bundle, ST depressions in anterolateral leads) vs demand ischemia 2/2 GI bleed; transferred to [facility] CCU for further care on 2/10. Echo demonstrated EF of 25%. Dobutamine gtt was initiated for cardiogenic shock/hypovolemic shock. Cardiac enzymes continued to uptrend; however management of NSTEMI complicated by continued melanotic BM’s requiring pRBC transfusion (total 2 units). Patient was intubated for C-scope and EGD, which did not identify source of bleeding; incidental duodenal mass visualized; pathology of mass remains unknown; extubated s/p scopes. Dobutamine gtt was discontinued. Initial attempt to initiate ASA, Plavix, heparin gtt was complicated by Hgb drop necessitating discontinuation of NSTEMI medical management. Upon continued monitoring of Hgb, ASA was initiated; tolerated well. Cardiac intervention was not pursued given inability to tolerate required post-procedure anticoagulation/DAPT; CCTA was not pursued given inability to tolerate beta blockade. For HFrEF, patient demonstrating improvement in fluid status with Lasix IV dosed per clinical presentation; diuresis complicated by asymptomatic hypernatremia now resolving; diuresis to continue with Lasix 10mg PO qd. Addition of beta blocker complicated by episodes of polymorphic ventricular tachycardia; holding beta blocker; EP consulted. Patient medically stable and was stepped down to tele [facility] for further management.     Subjective:   Remainder ROS otherwise negative.    Overnight Events:     TELEMETRY:    EKG:      VITAL SIGNS:  T(C): 37.6 (02-24-20 @ 13:19), Max: 37.6 (02-24-20 @ 13:19)  HR: 75 (02-24-20 @ 12:00) (62 - 79)  BP: 136/71 (02-24-20 @ 12:00) (100/55 - 145/70)  RR: 33 (02-24-20 @ 12:00) (14 - 33)  SpO2: 98% (02-24-20 @ 12:00) (92% - 100%)  Wt(kg): --    I&O's Summary    23 Feb 2020 07:01  -  24 Feb 2020 07:00  --------------------------------------------------------  IN: 552.5 mL / OUT: 850 mL / NET: -297.5 mL    24 Feb 2020 07:01  -  24 Feb 2020 14:09  --------------------------------------------------------  IN: 237 mL / OUT: 150 mL / NET: 87 mL          PHYSICAL EXAM:    General: A/ox 3, No acute Distress  Neck: Supple, NO JVD  Cardiac: S1 S2, No M/R/G  Pulmonary: CTAB, Breathing unlabored, No Rhonchi/Rales/Wheezing  Abdomen: Soft, Non -tender, +BS x 4 quads  Extremities: No Rashes, No edema  Neuro: A/o x 3, No focal deficits          LABS:                          11.1   12.25 )-----------( 322      ( 24 Feb 2020 05:21 )             37.4                              02-24    151<H>  |  115<H>  |  39<H>  ----------------------------<  145<H>  4.2   |  23  |  1.09    Ca    8.9      24 Feb 2020 05:21  Phos  3.6     02-24  Mg     2.5     02-24                                CAPILLARY BLOOD GLUCOSE      POCT Blood Glucose.: 149 mg/dL (24 Feb 2020 10:51)  POCT Blood Glucose.: 137 mg/dL (24 Feb 2020 06:51)  POCT Blood Glucose.: 174 mg/dL (23 Feb 2020 21:36)  POCT Blood Glucose.: 168 mg/dL (23 Feb 2020 17:31)            Allergies:  No Known Allergies    MEDICATIONS  (STANDING):  albuterol/ipratropium for Nebulization. 3 milliLiter(s) Nebulizer every 6 hours  aspirin  chewable 81 milliGRAM(s) Oral daily  atorvastatin 40 milliGRAM(s) Oral at bedtime  chlorhexidine 2% Cloths 1 Application(s) Topical <User Schedule>  dextrose 5%. 1000 milliLiter(s) (50 mL/Hr) IV Continuous <Continuous>  dextrose 50% Injectable 12.5 Gram(s) IV Push once  dextrose 50% Injectable 25 Gram(s) IV Push once  dextrose 50% Injectable 25 Gram(s) IV Push once  enoxaparin Injectable 30 milliGRAM(s) SubCutaneous every 24 hours  finasteride 5 milliGRAM(s) Oral daily  furosemide    Tablet 10 milliGRAM(s) Oral daily  insulin lispro (HumaLOG) corrective regimen sliding scale   SubCutaneous Before meals and at bedtime  latanoprost 0.005% Ophthalmic Solution 1 Drop(s) Right EYE at bedtime  pantoprazole    Tablet 40 milliGRAM(s) Oral before breakfast  polyethylene glycol 3350 17 Gram(s) Oral two times a day    MEDICATIONS  (PRN):  dextrose 40% Gel 15 Gram(s) Oral once PRN Blood Glucose LESS THAN 70 milliGRAM(s)/deciliter  glucagon  Injectable 1 milliGRAM(s) IntraMuscular once PRN Glucose LESS THAN 70 milligrams/deciliter        DIAGNOSTIC TESTS: CARDIOLOGY NP PROGRESS/TRANSFER ACCEPTANCE  NOTE    HOSPITAL COURSE:   87M PMH CAD (CABG 10y ago, 2/3 grafts occluded), dementia (AAOx2), DM-II, presenting to [place] 2/9/20 with AMS (more confused than baseline per wife and HHA), found with melanotic BM and elevated troponins (11.2) concerning for NSTEMI (EKG w/right bundle, ST depressions in anterolateral leads) vs demand ischemia 2/2 GI bleed; transferred to [facility] CCU for further care on 2/10. Echo demonstrated EF of 25%. Dobutamine gtt was initiated for cardiogenic shock/hypovolemic shock. Cardiac enzymes continued to uptrend; however management of NSTEMI complicated by continued melanotic BM’s requiring pRBC transfusion (total 2 units). Patient was intubated for C-scope and EGD, which did not identify source of bleeding; incidental duodenal mass visualized; pathology of mass remains unknown; extubated s/p scopes. Dobutamine gtt was discontinued. Initial attempt to initiate ASA, Plavix, heparin gtt was complicated by Hgb drop necessitating discontinuation of NSTEMI medical management. Upon continued monitoring of Hgb, ASA was initiated; tolerated well. Cardiac intervention was not pursued given inability to tolerate required post-procedure anticoagulation/DAPT; CCTA was not pursued given inability to tolerate beta blockade. For HFrEF, patient demonstrating improvement in fluid status with Lasix IV dosed per clinical presentation; diuresis complicated by asymptomatic hypernatremia now resolving; diuresis to continue with Lasix 10mg PO qd. Addition of beta blocker complicated by episodes of polymorphic ventricular tachycardia; holding beta blocker; EP consulted. Patient medically stable and was stepped down to tele [facility] for further management.     Subjective:   Remainder ROS otherwise negative.    TELEMETRY: SR with occasional PVCs (HR           VITAL SIGNS:  T(C): 37.6 (02-24-20 @ 13:19), Max: 37.6 (02-24-20 @ 13:19)  HR: 75 (02-24-20 @ 12:00) (62 - 79)  BP: 136/71 (02-24-20 @ 12:00) (100/55 - 145/70)  RR: 33 (02-24-20 @ 12:00) (14 - 33)  SpO2: 98% (02-24-20 @ 12:00) (92% - 100%)  Wt(kg): --    I&O's Summary    23 Feb 2020 07:01  -  24 Feb 2020 07:00  --------------------------------------------------------  IN: 552.5 mL / OUT: 850 mL / NET: -297.5 mL    24 Feb 2020 07:01  -  24 Feb 2020 14:09  --------------------------------------------------------  IN: 237 mL / OUT: 150 mL / NET: 87 mL          PHYSICAL EXAM:    General: A/ox 3, No acute Distress  Neck: Supple, NO JVD  Cardiac: S1 S2, No M/R/G  Pulmonary: CTAB, Breathing unlabored, No Rhonchi/Rales/Wheezing  Abdomen: Soft, Non -tender, +BS x 4 quads  Extremities: No Rashes, No edema  Neuro: A/o x 3, No focal deficits          LABS:                          11.1   12.25 )-----------( 322      ( 24 Feb 2020 05:21 )             37.4                              02-24    151<H>  |  115<H>  |  39<H>  ----------------------------<  145<H>  4.2   |  23  |  1.09    Ca    8.9      24 Feb 2020 05:21  Phos  3.6     02-24  Mg     2.5     02-24                                CAPILLARY BLOOD GLUCOSE      POCT Blood Glucose.: 149 mg/dL (24 Feb 2020 10:51)  POCT Blood Glucose.: 137 mg/dL (24 Feb 2020 06:51)  POCT Blood Glucose.: 174 mg/dL (23 Feb 2020 21:36)  POCT Blood Glucose.: 168 mg/dL (23 Feb 2020 17:31)            Allergies:  No Known Allergies    MEDICATIONS  (STANDING):  albuterol/ipratropium for Nebulization. 3 milliLiter(s) Nebulizer every 6 hours  aspirin  chewable 81 milliGRAM(s) Oral daily  atorvastatin 40 milliGRAM(s) Oral at bedtime  chlorhexidine 2% Cloths 1 Application(s) Topical <User Schedule>  dextrose 5%. 1000 milliLiter(s) (50 mL/Hr) IV Continuous <Continuous>  dextrose 50% Injectable 12.5 Gram(s) IV Push once  dextrose 50% Injectable 25 Gram(s) IV Push once  dextrose 50% Injectable 25 Gram(s) IV Push once  enoxaparin Injectable 30 milliGRAM(s) SubCutaneous every 24 hours  finasteride 5 milliGRAM(s) Oral daily  furosemide    Tablet 10 milliGRAM(s) Oral daily  insulin lispro (HumaLOG) corrective regimen sliding scale   SubCutaneous Before meals and at bedtime  latanoprost 0.005% Ophthalmic Solution 1 Drop(s) Right EYE at bedtime  pantoprazole    Tablet 40 milliGRAM(s) Oral before breakfast  polyethylene glycol 3350 17 Gram(s) Oral two times a day    MEDICATIONS  (PRN):  dextrose 40% Gel 15 Gram(s) Oral once PRN Blood Glucose LESS THAN 70 milliGRAM(s)/deciliter  glucagon  Injectable 1 milliGRAM(s) IntraMuscular once PRN Glucose LESS THAN 70 milligrams/deciliter        DIAGNOSTIC TESTS: CARDIOLOGY NP PROGRESS/TRANSFER ACCEPTANCE  NOTE    HOSPITAL COURSE:   87M PMH CAD (CABG 10y ago, 2/3 grafts occluded), dementia (AAOx2), DM-II, presenting to [place] 2/9/20 with AMS (more confused than baseline per wife and HHA), found with melanotic BM and elevated troponins (11.2) concerning for NSTEMI (EKG w/right bundle, ST depressions in anterolateral leads) vs demand ischemia 2/2 GI bleed; transferred to [facility] CCU for further care on 2/10. Echo demonstrated EF of 25%. Dobutamine gtt was initiated for cardiogenic shock/hypovolemic shock. Cardiac enzymes continued to uptrend; however management of NSTEMI complicated by continued melanotic BM’s requiring pRBC transfusion (total 2 units). Patient was intubated for C-scope and EGD, which did not identify source of bleeding; incidental duodenal mass visualized; pathology of mass remains unknown; extubated s/p scopes. Dobutamine gtt was discontinued. Initial attempt to initiate ASA, Plavix, heparin gtt was complicated by Hgb drop necessitating discontinuation of NSTEMI medical management. Upon continued monitoring of Hgb, ASA was initiated; tolerated well. Cardiac intervention was not pursued given inability to tolerate required post-procedure anticoagulation/DAPT; CCTA was not pursued given inability to tolerate beta blockade. For HFrEF, patient demonstrating improvement in fluid status with Lasix IV dosed per clinical presentation; diuresis complicated by asymptomatic hypernatremia now resolving; diuresis to continue with Lasix 10mg PO qd. Addition of beta blocker complicated by episodes of polymorphic ventricular tachycardia; holding beta blocker; EP consulted. Patient medically stable and was stepped down to tele [facility] for further management.     Subjective:   Remainder ROS otherwise negative.    TELEMETRY: SR with occasional PVCs (HR           VITAL SIGNS:  T(C): 37.6 (02-24-20 @ 13:19), Max: 37.6 (02-24-20 @ 13:19)  HR: 75 (02-24-20 @ 12:00) (62 - 79)  BP: 136/71 (02-24-20 @ 12:00) (100/55 - 145/70)  RR: 33 (02-24-20 @ 12:00) (14 - 33)  SpO2: 98% (02-24-20 @ 12:00) (92% - 100%)  Wt(kg): --    I&O's Summary    23 Feb 2020 07:01  -  24 Feb 2020 07:00  --------------------------------------------------------  IN: 552.5 mL / OUT: 850 mL / NET: -297.5 mL    24 Feb 2020 07:01  -  24 Feb 2020 14:09  --------------------------------------------------------  IN: 237 mL / OUT: 150 mL / NET: 87 mL          PHYSICAL EXAM:    General:  Lethargic, yet arousable. Alert to self.  No acute Distress  Neck: Supple, NO JVD  Cardiac: S1 S2, No M/R/G  Pulmonary: CTAB, Breathing unlabored, No Rhonchi/Rales/Wheezing  Abdomen: Soft, Non -tender, +BS x 4 quads  Extremities: No Rashes, No edema  Neuro: Lethargic, yet arousable. Alert to self. Able to follow simple commands.  No focal deficits          LABS:                          11.1   12.25 )-----------( 322      ( 24 Feb 2020 05:21 )             37.4                              02-24    151<H>  |  115<H>  |  39<H>  ----------------------------<  145<H>  4.2   |  23  |  1.09    Ca    8.9      24 Feb 2020 05:21  Phos  3.6     02-24  Mg     2.5     02-24                                CAPILLARY BLOOD GLUCOSE      POCT Blood Glucose.: 149 mg/dL (24 Feb 2020 10:51)  POCT Blood Glucose.: 137 mg/dL (24 Feb 2020 06:51)  POCT Blood Glucose.: 174 mg/dL (23 Feb 2020 21:36)  POCT Blood Glucose.: 168 mg/dL (23 Feb 2020 17:31)            Allergies:  No Known Allergies    MEDICATIONS  (STANDING):  albuterol/ipratropium for Nebulization. 3 milliLiter(s) Nebulizer every 6 hours  aspirin  chewable 81 milliGRAM(s) Oral daily  atorvastatin 40 milliGRAM(s) Oral at bedtime  chlorhexidine 2% Cloths 1 Application(s) Topical <User Schedule>  dextrose 5%. 1000 milliLiter(s) (50 mL/Hr) IV Continuous <Continuous>  dextrose 50% Injectable 12.5 Gram(s) IV Push once  dextrose 50% Injectable 25 Gram(s) IV Push once  dextrose 50% Injectable 25 Gram(s) IV Push once  enoxaparin Injectable 30 milliGRAM(s) SubCutaneous every 24 hours  finasteride 5 milliGRAM(s) Oral daily  furosemide    Tablet 10 milliGRAM(s) Oral daily  insulin lispro (HumaLOG) corrective regimen sliding scale   SubCutaneous Before meals and at bedtime  latanoprost 0.005% Ophthalmic Solution 1 Drop(s) Right EYE at bedtime  pantoprazole    Tablet 40 milliGRAM(s) Oral before breakfast  polyethylene glycol 3350 17 Gram(s) Oral two times a day    MEDICATIONS  (PRN):  dextrose 40% Gel 15 Gram(s) Oral once PRN Blood Glucose LESS THAN 70 milliGRAM(s)/deciliter  glucagon  Injectable 1 milliGRAM(s) IntraMuscular once PRN Glucose LESS THAN 70 milligrams/deciliter        DIAGNOSTIC TESTS: No

## 2020-02-24 NOTE — PROGRESS NOTE ADULT - SUBJECTIVE AND OBJECTIVE BOX
NOTE IN PROGRESS    OVERNIGHT EVENTS: No acute events overnight.  SUBJECTIVE: Patient seen and examined at the bedside.   12-point ROS reviewed and is otherwise negative.    Vital Signs Last 12 Hrs  T(F): 97.6 (02-24-20 @ 05:19), Max: 97.8 (02-23-20 @ 21:00)  HR: 73 (02-24-20 @ 08:00) (63 - 79)  BP: 122/60 (02-24-20 @ 08:00) (100/55 - 134/60)  BP(mean): 84 (02-24-20 @ 08:00) (73 - 87)  RR: 19 (02-24-20 @ 08:00) (14 - 23)  SpO2: 96% (02-24-20 @ 08:00) (92% - 97%)    I&O's Summary  23 Feb 2020 07:01  -  24 Feb 2020 07:00  --------------------------------------------------------  IN: 552.5 mL / OUT: 850 mL / NET: -297.5 mL      PHYSICAL EXAM:  General: NAD, resting comfortably in bed  HEENT: NCAT, PERRL, EOMI, MMM  Neck: no JVD  Respiratory: CTA b/l, no WRR  Cardiovascular: normal S1/S2, RRR, no MRG  Vascular: 2+ radial/DP pulses b/l  Abdomen: +BS x4 quadrants, soft, NTND  Extremities: WWP, no clubbing, no cyanosis, no edema  Skin: no gross skin abnormalities or rashes noted  Neuro: AAOx3, no focal neurological deficits    LABS:                        11.1   12.25 )-----------( 322      ( 24 Feb 2020 05:21 )             37.4     02-24    151<H>  |  115<H>  |  39<H>  ----------------------------<  145<H>  4.2   |  23  |  1.09    Ca    8.9      24 Feb 2020 05:21  Phos  3.6     02-24  Mg     2.5     02-24    RADIOLOGY & ADDITIONAL TESTS: Reviewed    MEDICATIONS  (STANDING):  albuterol/ipratropium for Nebulization. 3 milliLiter(s) Nebulizer every 6 hours  aspirin  chewable 81 milliGRAM(s) Oral daily  atorvastatin 40 milliGRAM(s) Oral at bedtime  chlorhexidine 2% Cloths 1 Application(s) Topical <User Schedule>  dextrose 5%. 1000 milliLiter(s) (50 mL/Hr) IV Continuous <Continuous>  dextrose 50% Injectable 12.5 Gram(s) IV Push once  dextrose 50% Injectable 25 Gram(s) IV Push once  dextrose 50% Injectable 25 Gram(s) IV Push once  finasteride 5 milliGRAM(s) Oral daily  insulin lispro (HumaLOG) corrective regimen sliding scale   SubCutaneous Before meals and at bedtime  latanoprost 0.005% Ophthalmic Solution 1 Drop(s) Right EYE at bedtime  pantoprazole    Tablet 40 milliGRAM(s) Oral before breakfast  polyethylene glycol 3350 17 Gram(s) Oral two times a day    MEDICATIONS  (PRN):  dextrose 40% Gel 15 Gram(s) Oral once PRN Blood Glucose LESS THAN 70 milliGRAM(s)/deciliter  glucagon  Injectable 1 milliGRAM(s) IntraMuscular once PRN Glucose LESS THAN 70 milligrams/deciliter NOTE IN PROGRESS    HOSPITAL COURSE:  87M PMH CAD (CABG 10y ago, 2/3 grafts occluded), dementia (AAOx2), DM-II, presenting to [facility] with AMS, found with melanotic BM and elevated troponins concerning for NSTEMI vs demand ischemia 2/2 GI bleed; transferred to [facility] CCU for further care. Echo demonstrated EF of 25%. Dobutamine gtt was initiated for cardiogenic shock. Cardiac enzymes continued to uptrend; however management of NSTEMI complicated by continued melanotic BM’s requiring pRBC transfusion. Patient was intubated for C-scope and EGD, which did not identify source of bleeding; incidental duodenal mass visualized; pathology of mass remains unknown; extubated s/p scopes. Dobutamine gtt was discontinued. Initial attempt to initiate ASA, Plavix, heparin gtt was complicated by Hgb drop necessitating discontinuation of NSTEMI medical management. Upon continued monitoring of Hgb, ASA was initiated; tolerated well. Cardiac intervention was not pursued given inability to tolerate required post-procedure anticoagulation/DAPT; CCTA was not pursued given inability to tolerate beta blockade. For HFrEF, patient demonstrating improvement in fluid status with IV Lasix dosed per clinical presentation; diuresis complicated by asymptomatic hypernatremia now resolving. Addition of beta blocker complicated by episodes of wide-complex tachycardia; holding beta blocker. Patient medically stable for stepdown.    OVERNIGHT EVENTS: 23 beats wide complex tachyardia, patient remained asymptomatic otherwise. Decreased protonix from bid to qd given concern for QTc prolongation.   SUBJECTIVE: Patient seen and examined at the bedside. Patient appears comfortable resting in bed, appears to be mentating at baseline AAOx1-2, able to follow basic commands. ROS limited.    Vital Signs Last 12 Hrs  T(F): 97.6 (02-24-20 @ 05:19), Max: 97.8 (02-23-20 @ 21:00)  HR: 73 (02-24-20 @ 08:00) (63 - 79)  BP: 122/60 (02-24-20 @ 08:00) (100/55 - 134/60)  BP(mean): 84 (02-24-20 @ 08:00) (73 - 87)  RR: 19 (02-24-20 @ 08:00) (14 - 23)  SpO2: 96% (02-24-20 @ 08:00) (92% - 97%)    I&O's Summary  23 Feb 2020 07:01  -  24 Feb 2020 07:00  --------------------------------------------------------  IN: 552.5 mL / OUT: 850 mL / NET: -297.5 mL    PHYSICAL EXAM:  General: NAD, resting comfortably in bed, AAOx1-2 (self, general place), follows simple commands  HEENT: NCAT, PERRL, dry mucous membranes  Neck: no JVD  Respiratory: CTA b/l, no WRR, breathing comfortably on room air  Cardiovascular: normal S1/S2, RRR, no MRG  Vascular: 2+ radial/DP pulses b/l  Abdomen: +BS x4 quadrants, soft, NTND  Extremities: WWP x4, no edema  Skin: no gross skin abnormalities or rashes noted    LABS:                        11.1   12.25 )-----------( 322      ( 24 Feb 2020 05:21 )             37.4     02-24    151<H>  |  115<H>  |  39<H>  ----------------------------<  145<H>  4.2   |  23  |  1.09    Ca    8.9      24 Feb 2020 05:21  Phos  3.6     02-24  Mg     2.5     02-24    RADIOLOGY & ADDITIONAL TESTS: Reviewed    MEDICATIONS  (STANDING):  albuterol/ipratropium for Nebulization. 3 milliLiter(s) Nebulizer every 6 hours  aspirin  chewable 81 milliGRAM(s) Oral daily  atorvastatin 40 milliGRAM(s) Oral at bedtime  chlorhexidine 2% Cloths 1 Application(s) Topical <User Schedule>  dextrose 5%. 1000 milliLiter(s) (50 mL/Hr) IV Continuous <Continuous>  dextrose 50% Injectable 12.5 Gram(s) IV Push once  dextrose 50% Injectable 25 Gram(s) IV Push once  dextrose 50% Injectable 25 Gram(s) IV Push once  finasteride 5 milliGRAM(s) Oral daily  insulin lispro (HumaLOG) corrective regimen sliding scale   SubCutaneous Before meals and at bedtime  latanoprost 0.005% Ophthalmic Solution 1 Drop(s) Right EYE at bedtime  pantoprazole    Tablet 40 milliGRAM(s) Oral before breakfast  polyethylene glycol 3350 17 Gram(s) Oral two times a day    MEDICATIONS  (PRN):  dextrose 40% Gel 15 Gram(s) Oral once PRN Blood Glucose LESS THAN 70 milliGRAM(s)/deciliter  glucagon  Injectable 1 milliGRAM(s) IntraMuscular once PRN Glucose LESS THAN 70 milligrams/deciliter NOTE IN PROGRESS    HOSPITAL COURSE:  87M PMH CAD (CABG 10y ago, 2/3 grafts occluded), dementia (AAOx2), DM-II, presenting to [facility] with AMS, found with melanotic BM and elevated troponins concerning for NSTEMI vs demand ischemia 2/2 GI bleed; transferred to [facility] CCU for further care. Echo demonstrated EF of 25%. Dobutamine gtt was initiated for cardiogenic shock. Cardiac enzymes continued to uptrend; however management of NSTEMI complicated by continued melanotic BM’s requiring pRBC transfusion. Patient was intubated for C-scope and EGD, which did not identify source of bleeding; incidental duodenal mass visualized; pathology of mass remains unknown; extubated s/p scopes. Dobutamine gtt was discontinued. Initial attempt to initiate ASA, Plavix, heparin gtt was complicated by Hgb drop necessitating discontinuation of NSTEMI medical management. Upon continued monitoring of Hgb, ASA was initiated; tolerated well. Cardiac intervention was not pursued given inability to tolerate required post-procedure anticoagulation/DAPT; CCTA was not pursued given inability to tolerate beta blockade. For HFrEF, patient demonstrating improvement in fluid status with IV Lasix dosed per clinical presentation; diuresis complicated by asymptomatic hypernatremia now resolving. Addition of beta blocker complicated by episodes of polymorphic ventricular tachycardia; holding beta blocker. Patient medically stable for stepdown.    OVERNIGHT EVENTS: 23 beats wide complex tachyardia, patient remained asymptomatic otherwise. Decreased protonix from bid to qd given concern for QTc prolongation.   SUBJECTIVE: Patient seen and examined at the bedside. Patient appears comfortable resting in bed, appears to be mentating at baseline AAOx1-2, able to follow basic commands. ROS limited.    Vital Signs Last 12 Hrs  T(F): 97.6 (02-24-20 @ 05:19), Max: 97.8 (02-23-20 @ 21:00)  HR: 73 (02-24-20 @ 08:00) (63 - 79)  BP: 122/60 (02-24-20 @ 08:00) (100/55 - 134/60)  BP(mean): 84 (02-24-20 @ 08:00) (73 - 87)  RR: 19 (02-24-20 @ 08:00) (14 - 23)  SpO2: 96% (02-24-20 @ 08:00) (92% - 97%)    I&O's Summary  23 Feb 2020 07:01  -  24 Feb 2020 07:00  --------------------------------------------------------  IN: 552.5 mL / OUT: 850 mL / NET: -297.5 mL    PHYSICAL EXAM:  General: NAD, resting comfortably in bed, AAOx1-2 (self, general place), follows simple commands  HEENT: NCAT, PERRL, dry mucous membranes  Neck: no JVD  Respiratory: CTA b/l, no WRR, breathing comfortably on room air  Cardiovascular: normal S1/S2, RRR, no MRG  Vascular: 2+ radial/DP pulses b/l  Abdomen: +BS x4 quadrants, soft, NTND  Extremities: WWP x4, no edema  Skin: no gross skin abnormalities or rashes noted    LABS:                        11.1   12.25 )-----------( 322      ( 24 Feb 2020 05:21 )             37.4     02-24    151<H>  |  115<H>  |  39<H>  ----------------------------<  145<H>  4.2   |  23  |  1.09    Ca    8.9      24 Feb 2020 05:21  Phos  3.6     02-24  Mg     2.5     02-24    RADIOLOGY & ADDITIONAL TESTS: Reviewed    MEDICATIONS  (STANDING):  albuterol/ipratropium for Nebulization. 3 milliLiter(s) Nebulizer every 6 hours  aspirin  chewable 81 milliGRAM(s) Oral daily  atorvastatin 40 milliGRAM(s) Oral at bedtime  chlorhexidine 2% Cloths 1 Application(s) Topical <User Schedule>  dextrose 5%. 1000 milliLiter(s) (50 mL/Hr) IV Continuous <Continuous>  dextrose 50% Injectable 12.5 Gram(s) IV Push once  dextrose 50% Injectable 25 Gram(s) IV Push once  dextrose 50% Injectable 25 Gram(s) IV Push once  finasteride 5 milliGRAM(s) Oral daily  insulin lispro (HumaLOG) corrective regimen sliding scale   SubCutaneous Before meals and at bedtime  latanoprost 0.005% Ophthalmic Solution 1 Drop(s) Right EYE at bedtime  pantoprazole    Tablet 40 milliGRAM(s) Oral before breakfast  polyethylene glycol 3350 17 Gram(s) Oral two times a day    MEDICATIONS  (PRN):  dextrose 40% Gel 15 Gram(s) Oral once PRN Blood Glucose LESS THAN 70 milliGRAM(s)/deciliter  glucagon  Injectable 1 milliGRAM(s) IntraMuscular once PRN Glucose LESS THAN 70 milligrams/deciliter TRANSFER FROM CCU TO [facility]:  HOSPITAL COURSE:  87M PMH CAD (CABG 10y ago, 2/3 grafts occluded), dementia (AAOx2), DM-II, presenting to [facility] with AMS, found with melanotic BM and elevated troponins concerning for NSTEMI vs demand ischemia 2/2 GI bleed; transferred to [facility] CCU for further care. Echo demonstrated EF of 25%. Dobutamine gtt was initiated for cardiogenic shock. Cardiac enzymes continued to uptrend; however management of NSTEMI complicated by continued melanotic BM’s requiring pRBC transfusion. Patient was intubated for C-scope and EGD, which did not identify source of bleeding; incidental duodenal mass visualized; pathology of mass remains unknown; extubated s/p scopes. Dobutamine gtt was discontinued. Initial attempt to initiate ASA, Plavix, heparin gtt was complicated by Hgb drop necessitating discontinuation of NSTEMI medical management. Upon continued monitoring of Hgb, ASA was initiated; tolerated well. Cardiac intervention was not pursued given inability to tolerate required post-procedure anticoagulation/DAPT; CCTA was not pursued given inability to tolerate beta blockade. For HFrEF, patient demonstrating improvement in fluid status with Lasix IV dosed per clinical presentation; diuresis complicated by asymptomatic hypernatremia now resolving; diuresis to continue with Lasix 10mg PO qd. Addition of beta blocker complicated by episodes of polymorphic ventricular tachycardia; holding beta blocker; EP consulted. Patient medically stable for stepdown.    OVERNIGHT EVENTS: 23 beats wide complex tachyardia, patient remained asymptomatic otherwise. Decreased protonix from bid to qd given concern for QTc prolongation.   SUBJECTIVE: Patient seen and examined at the bedside. Patient appears comfortable resting in bed, appears to be mentating at baseline AAOx1-2, able to follow basic commands. ROS limited.    Vital Signs Last 12 Hrs  T(F): 97.6 (02-24-20 @ 05:19), Max: 97.8 (02-23-20 @ 21:00)  HR: 73 (02-24-20 @ 08:00) (63 - 79)  BP: 122/60 (02-24-20 @ 08:00) (100/55 - 134/60)  BP(mean): 84 (02-24-20 @ 08:00) (73 - 87)  RR: 19 (02-24-20 @ 08:00) (14 - 23)  SpO2: 96% (02-24-20 @ 08:00) (92% - 97%)    I&O's Summary  23 Feb 2020 07:01  -  24 Feb 2020 07:00  --------------------------------------------------------  IN: 552.5 mL / OUT: 850 mL / NET: -297.5 mL    PHYSICAL EXAM:  General: NAD, resting comfortably in bed, AAOx1-2 (self, general place), follows simple commands  HEENT: NCAT, PERRL, dry mucous membranes  Neck: no JVD  Respiratory: CTA b/l, no WRR, breathing comfortably on room air  Cardiovascular: normal S1/S2, RRR, no MRG  Vascular: 2+ radial/DP pulses b/l  Abdomen: +BS x4 quadrants, soft, NTND  Extremities: WWP x4, no edema  Skin: no gross skin abnormalities or rashes noted    LABS:                        11.1   12.25 )-----------( 322      ( 24 Feb 2020 05:21 )             37.4     02-24    151<H>  |  115<H>  |  39<H>  ----------------------------<  145<H>  4.2   |  23  |  1.09    Ca    8.9      24 Feb 2020 05:21  Phos  3.6     02-24  Mg     2.5     02-24    RADIOLOGY & ADDITIONAL TESTS: Reviewed    MEDICATIONS  (STANDING):  albuterol/ipratropium for Nebulization. 3 milliLiter(s) Nebulizer every 6 hours  aspirin  chewable 81 milliGRAM(s) Oral daily  atorvastatin 40 milliGRAM(s) Oral at bedtime  chlorhexidine 2% Cloths 1 Application(s) Topical <User Schedule>  dextrose 5%. 1000 milliLiter(s) (50 mL/Hr) IV Continuous <Continuous>  dextrose 50% Injectable 12.5 Gram(s) IV Push once  dextrose 50% Injectable 25 Gram(s) IV Push once  dextrose 50% Injectable 25 Gram(s) IV Push once  finasteride 5 milliGRAM(s) Oral daily  insulin lispro (HumaLOG) corrective regimen sliding scale   SubCutaneous Before meals and at bedtime  latanoprost 0.005% Ophthalmic Solution 1 Drop(s) Right EYE at bedtime  pantoprazole    Tablet 40 milliGRAM(s) Oral before breakfast  polyethylene glycol 3350 17 Gram(s) Oral two times a day    MEDICATIONS  (PRN):  dextrose 40% Gel 15 Gram(s) Oral once PRN Blood Glucose LESS THAN 70 milliGRAM(s)/deciliter  glucagon  Injectable 1 milliGRAM(s) IntraMuscular once PRN Glucose LESS THAN 70 milligrams/deciliter TRANSFER FROM CCU TO [facility]:  HOSPITAL COURSE:  87M PMH CAD (CABG 10y ago, 2/3 grafts occluded), dementia (AAOx2), DM-II, presenting to [place] with AMS, found with melanotic BM and elevated troponins concerning for NSTEMI vs demand ischemia 2/2 GI bleed; transferred to [facility] CCU for further care. Echo demonstrated EF of 25%. Dobutamine gtt was initiated for cardiogenic shock/hypovolemic shock. Cardiac enzymes continued to uptrend; however management of NSTEMI complicated by continued melanotic BM’s requiring pRBC transfusion. Patient was intubated for C-scope and EGD, which did not identify source of bleeding; incidental duodenal mass visualized; pathology of mass remains unknown; extubated s/p scopes. Dobutamine gtt was discontinued. Initial attempt to initiate ASA, Plavix, heparin gtt was complicated by Hgb drop necessitating discontinuation of NSTEMI medical management. Upon continued monitoring of Hgb, ASA was initiated; tolerated well. Cardiac intervention was not pursued given inability to tolerate required post-procedure anticoagulation/DAPT; CCTA was not pursued given inability to tolerate beta blockade. For HFrEF, patient demonstrating improvement in fluid status with Lasix IV dosed per clinical presentation; diuresis complicated by asymptomatic hypernatremia now resolving; diuresis to continue with Lasix 10mg PO qd. Addition of beta blocker complicated by episodes of polymorphic ventricular tachycardia; holding beta blocker; EP consulted. Patient medically stable for stepdown.    OVERNIGHT EVENTS: 23 beats wide complex tachyardia, patient remained asymptomatic otherwise. Decreased protonix from bid to qd given concern for QTc prolongation.   SUBJECTIVE: Patient seen and examined at the bedside. Patient appears comfortable resting in bed, appears to be mentating at baseline AAOx1-2, able to follow basic commands. ROS limited.    Vital Signs Last 12 Hrs  T(F): 97.6 (02-24-20 @ 05:19), Max: 97.8 (02-23-20 @ 21:00)  HR: 73 (02-24-20 @ 08:00) (63 - 79)  BP: 122/60 (02-24-20 @ 08:00) (100/55 - 134/60)  BP(mean): 84 (02-24-20 @ 08:00) (73 - 87)  RR: 19 (02-24-20 @ 08:00) (14 - 23)  SpO2: 96% (02-24-20 @ 08:00) (92% - 97%)    I&O's Summary  23 Feb 2020 07:01  -  24 Feb 2020 07:00  --------------------------------------------------------  IN: 552.5 mL / OUT: 850 mL / NET: -297.5 mL    PHYSICAL EXAM:  General: NAD, resting comfortably in bed, AAOx1-2 (self, general place), follows simple commands  HEENT: NCAT, PERRL, dry mucous membranes  Neck: no JVD  Respiratory: CTA b/l, no WRR, breathing comfortably on room air  Cardiovascular: normal S1/S2, RRR, no MRG  Vascular: 2+ radial/DP pulses b/l  Abdomen: +BS x4 quadrants, soft, NTND  Extremities: WWP x4, no edema  Skin: no gross skin abnormalities or rashes noted    LABS:                        11.1   12.25 )-----------( 322      ( 24 Feb 2020 05:21 )             37.4     02-24    151<H>  |  115<H>  |  39<H>  ----------------------------<  145<H>  4.2   |  23  |  1.09    Ca    8.9      24 Feb 2020 05:21  Phos  3.6     02-24  Mg     2.5     02-24    RADIOLOGY & ADDITIONAL TESTS: Reviewed    MEDICATIONS  (STANDING):  albuterol/ipratropium for Nebulization. 3 milliLiter(s) Nebulizer every 6 hours  aspirin  chewable 81 milliGRAM(s) Oral daily  atorvastatin 40 milliGRAM(s) Oral at bedtime  chlorhexidine 2% Cloths 1 Application(s) Topical <User Schedule>  dextrose 5%. 1000 milliLiter(s) (50 mL/Hr) IV Continuous <Continuous>  dextrose 50% Injectable 12.5 Gram(s) IV Push once  dextrose 50% Injectable 25 Gram(s) IV Push once  dextrose 50% Injectable 25 Gram(s) IV Push once  finasteride 5 milliGRAM(s) Oral daily  insulin lispro (HumaLOG) corrective regimen sliding scale   SubCutaneous Before meals and at bedtime  latanoprost 0.005% Ophthalmic Solution 1 Drop(s) Right EYE at bedtime  pantoprazole    Tablet 40 milliGRAM(s) Oral before breakfast  polyethylene glycol 3350 17 Gram(s) Oral two times a day    MEDICATIONS  (PRN):  dextrose 40% Gel 15 Gram(s) Oral once PRN Blood Glucose LESS THAN 70 milliGRAM(s)/deciliter  glucagon  Injectable 1 milliGRAM(s) IntraMuscular once PRN Glucose LESS THAN 70 milligrams/deciliter

## 2020-02-24 NOTE — PROGRESS NOTE ADULT - PROBLEM SELECTOR PLAN 4
Patient with systolic heart failure as EF 25% with global hypokinesis. Lungs CTA without LE edema.   - CXR this am with improvement in congestion. CXR AM, follow up.   - c/w PO Lasix 10mg qd   - continue to HOLD beta blockers in setting of Vtach episodes   - continue to HOLD lisinopril in setting of tenuous BPs   - strict I/O's, daily weights.     #Pleural effusions   Effusions 2/2 HFrEF Patient with systolic heart failure as EF 25% with global hypokinesis. Lungs CTA without LE edema.   - CXR this am with improvement in congestion. CXR AM, follow up.   - c/w PO Lasix 10mg qd   - continue to HOLD beta blockers in setting of Vtach episodes   - continue to HOLD lisinopril in setting of tenuous BPs   - strict I/O's, daily weights.     #Pleural effusions   Effusions 2/2 HFrEF, improving.   - same plan as above.

## 2020-02-24 NOTE — CONSULT NOTE ADULT - ASSESSMENT
87M PMH CAD (CABG 10y ago, 2/3 grafts occluded), dementia (AAOx1, DM-II, presenting to [facility] 2/9/20 with AMS. Ruled in NSTEMI in setting of GIB. No plan for intervention due to inability to tolerate DAPT.  EF 25% and tele with 2 episodes of non-sustained PMVT.  QTc 547 ms on Ekg with RBBB.   - His HR trend on tele is around 60s.  D/w Dr. Su, would give Coreg 3.125 mg bid a trial for both heart failure and VT.  From EP perspective, we wouldn't recommend ICD given dnr/dni status and that he may have ischemia that is not able to fix.

## 2020-02-25 LAB
ANION GAP SERPL CALC-SCNC: 14 MMOL/L — SIGNIFICANT CHANGE UP (ref 5–17)
BUN SERPL-MCNC: 37 MG/DL — HIGH (ref 7–23)
CALCIUM SERPL-MCNC: 9.4 MG/DL — SIGNIFICANT CHANGE UP (ref 8.4–10.5)
CHLORIDE SERPL-SCNC: 116 MMOL/L — HIGH (ref 96–108)
CO2 SERPL-SCNC: 23 MMOL/L — SIGNIFICANT CHANGE UP (ref 22–31)
CREAT SERPL-MCNC: 1.1 MG/DL — SIGNIFICANT CHANGE UP (ref 0.5–1.3)
GLUCOSE BLDC GLUCOMTR-MCNC: 131 MG/DL — HIGH (ref 70–99)
GLUCOSE BLDC GLUCOMTR-MCNC: 139 MG/DL — HIGH (ref 70–99)
GLUCOSE BLDC GLUCOMTR-MCNC: 146 MG/DL — HIGH (ref 70–99)
GLUCOSE BLDC GLUCOMTR-MCNC: 178 MG/DL — HIGH (ref 70–99)
GLUCOSE SERPL-MCNC: 150 MG/DL — HIGH (ref 70–99)
HCT VFR BLD CALC: 43.1 % — SIGNIFICANT CHANGE UP (ref 39–50)
HGB BLD-MCNC: 12.4 G/DL — LOW (ref 13–17)
MAGNESIUM SERPL-MCNC: 2.5 MG/DL — SIGNIFICANT CHANGE UP (ref 1.6–2.6)
MCHC RBC-ENTMCNC: 28.1 PG — SIGNIFICANT CHANGE UP (ref 27–34)
MCHC RBC-ENTMCNC: 28.8 GM/DL — LOW (ref 32–36)
MCV RBC AUTO: 97.7 FL — SIGNIFICANT CHANGE UP (ref 80–100)
NRBC # BLD: 0 /100 WBCS — SIGNIFICANT CHANGE UP (ref 0–0)
PHOSPHATE SERPL-MCNC: 3.6 MG/DL — SIGNIFICANT CHANGE UP (ref 2.5–4.5)
PLATELET # BLD AUTO: 323 K/UL — SIGNIFICANT CHANGE UP (ref 150–400)
POTASSIUM SERPL-MCNC: 4.4 MMOL/L — SIGNIFICANT CHANGE UP (ref 3.5–5.3)
POTASSIUM SERPL-SCNC: 4.4 MMOL/L — SIGNIFICANT CHANGE UP (ref 3.5–5.3)
RBC # BLD: 4.41 M/UL — SIGNIFICANT CHANGE UP (ref 4.2–5.8)
RBC # FLD: 14.8 % — HIGH (ref 10.3–14.5)
SODIUM SERPL-SCNC: 153 MMOL/L — HIGH (ref 135–145)
WBC # BLD: 9.32 K/UL — SIGNIFICANT CHANGE UP (ref 3.8–10.5)
WBC # FLD AUTO: 9.32 K/UL — SIGNIFICANT CHANGE UP (ref 3.8–10.5)

## 2020-02-25 PROCEDURE — 71045 X-RAY EXAM CHEST 1 VIEW: CPT | Mod: 26

## 2020-02-25 PROCEDURE — 99233 SBSQ HOSP IP/OBS HIGH 50: CPT

## 2020-02-25 RX ORDER — CARVEDILOL PHOSPHATE 80 MG/1
3.12 CAPSULE, EXTENDED RELEASE ORAL EVERY 12 HOURS
Refills: 0 | Status: DISCONTINUED | OUTPATIENT
Start: 2020-02-25 | End: 2020-02-26

## 2020-02-25 RX ORDER — SODIUM CHLORIDE 9 MG/ML
250 INJECTION, SOLUTION INTRAVENOUS
Refills: 0 | Status: DISCONTINUED | OUTPATIENT
Start: 2020-02-25 | End: 2020-02-26

## 2020-02-25 RX ORDER — MULTIVIT-MIN/FERROUS GLUCONATE 9 MG/15 ML
1 LIQUID (ML) ORAL DAILY
Refills: 0 | Status: DISCONTINUED | OUTPATIENT
Start: 2020-02-25 | End: 2020-02-28

## 2020-02-25 RX ADMIN — Medication 2: at 12:47

## 2020-02-25 RX ADMIN — PANTOPRAZOLE SODIUM 40 MILLIGRAM(S): 20 TABLET, DELAYED RELEASE ORAL at 06:42

## 2020-02-25 RX ADMIN — Medication 81 MILLIGRAM(S): at 12:47

## 2020-02-25 RX ADMIN — Medication 3 MILLILITER(S): at 12:47

## 2020-02-25 RX ADMIN — CARVEDILOL PHOSPHATE 3.12 MILLIGRAM(S): 80 CAPSULE, EXTENDED RELEASE ORAL at 18:55

## 2020-02-25 RX ADMIN — POLYETHYLENE GLYCOL 3350 17 GRAM(S): 17 POWDER, FOR SOLUTION ORAL at 06:42

## 2020-02-25 RX ADMIN — Medication 1 TABLET(S): at 18:55

## 2020-02-25 RX ADMIN — ENOXAPARIN SODIUM 30 MILLIGRAM(S): 100 INJECTION SUBCUTANEOUS at 12:47

## 2020-02-25 RX ADMIN — FINASTERIDE 5 MILLIGRAM(S): 5 TABLET, FILM COATED ORAL at 12:47

## 2020-02-25 RX ADMIN — Medication 3 MILLILITER(S): at 06:42

## 2020-02-25 RX ADMIN — Medication 3 MILLILITER(S): at 22:28

## 2020-02-25 RX ADMIN — LATANOPROST 1 DROP(S): 0.05 SOLUTION/ DROPS OPHTHALMIC; TOPICAL at 22:29

## 2020-02-25 RX ADMIN — ATORVASTATIN CALCIUM 40 MILLIGRAM(S): 80 TABLET, FILM COATED ORAL at 22:28

## 2020-02-25 RX ADMIN — SODIUM CHLORIDE 50 MILLILITER(S): 9 INJECTION, SOLUTION INTRAVENOUS at 11:04

## 2020-02-25 RX ADMIN — Medication 3 MILLILITER(S): at 18:25

## 2020-02-25 RX ADMIN — Medication 10 MILLIGRAM(S): at 06:42

## 2020-02-25 NOTE — PROGRESS NOTE ADULT - ASSESSMENT
88 y/o M w/ PMH CAD s/p CABG 2009, HTN, HLD, DM, advanced dementia admitted to CCU w/ NSTEMI medically managed c/b cardiogenic shock s/p Dobutamine gtt, and GI bleed. Now off inotropic support without further melanotic episodes, stepped down to tele [facility] for further management.

## 2020-02-25 NOTE — PROGRESS NOTE ADULT - ASSESSMENT
87M PMH CAD (CABG 10y ago, 2/3 grafts occluded), dementia, DM-II, presenting to [facility] 2/9/20 with AMS. Ruled in NSTEMI in setting of GIB. No plan for intervention due to inability to tolerate DAPT.  EF 25% and tele with 2 episodes of non-sustained PMVT.  QTc 547 ms on Ekg with RBBB.   - In patient with PMVT and QTc, the immediate goal at this time would be to decrease arrhythmia occurrence.  A non-selective beta-blocker such as Propranolol or Nadolol (not available in this pharmacy) would be reasonable in this case. These non-selective bb may not be very effective as a heart failure treatment compared to Coreg. However, it appears that reducing arrhythmia is the priority goal at this time. Therefore, consider Propranolol while pt in the hospital and monitor him to see if he tolerates it. 87M PMH CAD (CABG 10y ago, 2/3 grafts occluded), dementia, DM-II, presenting to [place] 2/9/20 with AMS. Ruled in NSTEMI in setting of GIB. No plan for intervention due to inability to tolerate DAPT.  EF 25% and tele with 2 episodes of non-sustained PMVT.  QTc 547 ms on Ekg with RBBB.   - In patient with PMVT and QTc, the immediate goal at this time would be to decrease arrhythmia occurrence.  Literature research showed that Coreg does offer benefit in QTc prolongation in patients with chronic heart failure.  A trial of low dose coreg while on telemetry.  The use of beta-blocker for prolonged qtc is theorized to prevent triggered activities by suppressing Early Afterdepolarization (EAD).    - Case d/w dr. King

## 2020-02-25 NOTE — CHART NOTE - NSCHARTNOTEFT_GEN_A_CORE
Admitting Diagnosis:   Patient is a 87y old  Male who presents with a chief complaint of Cardiogenic shock, GIB, NSTEMI (24 Feb 2020 17:08)      PAST MEDICAL & SURGICAL HISTORY:  Dementia  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  DM2 (diabetes mellitus, type 2)  CAD, multiple vessel  H/O hernia repair  S/P CABG (coronary artery bypass graft)      Current Nutrition Order: puree, DASH TLC, Consistent Carbohydrate   PO Intake: Good (%) [   ]  Fair (50-75%) [   ] Poor (<25%) [X]  GI Issues:  +BM 2/24   Pain: none per flow sheets   Skin: no edema/pressure ulcers noted at this time     Labs:   02-25    153<H>  |  116<H>  |  37<H>  ----------------------------<  150<H>  4.4   |  23  |  1.10    Ca    9.4      25 Feb 2020 06:12  Phos  3.6     02-25  Mg     2.5     02-25      CAPILLARY BLOOD GLUCOSE      POCT Blood Glucose.: 139 mg/dL (25 Feb 2020 07:28)  POCT Blood Glucose.: 160 mg/dL (24 Feb 2020 21:29)  POCT Blood Glucose.: 139 mg/dL (24 Feb 2020 16:32)      Medications:  MEDICATIONS  (STANDING):  albuterol/ipratropium for Nebulization. 3 milliLiter(s) Nebulizer every 6 hours  aspirin  chewable 81 milliGRAM(s) Oral daily  atorvastatin 40 milliGRAM(s) Oral at bedtime  chlorhexidine 2% Cloths 1 Application(s) Topical <User Schedule>  dextrose 5%. 250 milliLiter(s) (50 mL/Hr) IV Continuous <Continuous>  dextrose 5%. 1000 milliLiter(s) (50 mL/Hr) IV Continuous <Continuous>  dextrose 50% Injectable 12.5 Gram(s) IV Push once  dextrose 50% Injectable 25 Gram(s) IV Push once  dextrose 50% Injectable 25 Gram(s) IV Push once  enoxaparin Injectable 30 milliGRAM(s) SubCutaneous every 24 hours  finasteride 5 milliGRAM(s) Oral daily  furosemide    Tablet 10 milliGRAM(s) Oral daily  insulin lispro (HumaLOG) corrective regimen sliding scale   SubCutaneous Before meals and at bedtime  latanoprost 0.005% Ophthalmic Solution 1 Drop(s) Right EYE at bedtime  pantoprazole    Tablet 40 milliGRAM(s) Oral before breakfast  polyethylene glycol 3350 17 Gram(s) Oral two times a day    MEDICATIONS  (PRN):  dextrose 40% Gel 15 Gram(s) Oral once PRN Blood Glucose LESS THAN 70 milliGRAM(s)/deciliter  glucagon  Injectable 1 milliGRAM(s) IntraMuscular once PRN Glucose LESS THAN 70 milligrams/deciliter      5'0''  pounds +/-10%   (2/10) 150 pounds  (2/13) 153 pounds  (2/17) 145.9 pounds   (2/18) 137.1 pounds   (2/20) 141.7 pounds   (2/24) 133.8 pounds  (2/25) 127.6 pounds   %MAN=783% BMI=24.80 -- Based on most recent EMR wt   Wt has trended down since admission; CHF pt who has been on diffrent floors with diuretic use noted; +Edema when admitted (1+ left ankle;  right ankle), with none noted at this time - suggests wt loss of 22.4 pounds     Nutrition Focused Physical Exam: Completed [  X -limited given pt ability to participate at this time ]  Not Pertinent [   ]  Muscle Wasting- Temporal [ Moderate ]  Clavicle/Pectoral [   ]  Shoulder/Deltoid [   ]  Scapula [   ]  Interosseous [   ]  Quadriceps [   ]  Gastrocnemius [   ]  Fat Wasting- Orbital [   ]  Buccal [   ]  Triceps [   ]  Rib [   ]      IBW used to calculate energy needs d/t varying EMR wts  Adjusted for age, CHF, fluids per team  1200-1440kcal/day 25-30kcal/kg  58gm/day 1.2gm/kg     Subjective:   88yo M, HX of CAD, hx CABG approx 10 years ago, DM2, hernia, dementia (AAOx2 at baseline). Pt with AMS PTA, presents for NSTEMI vs Type II MI, cardiogenic shock vs less likely hypovolemic shock from GIB, Acute CHF EF 25% volume overload - need for repeat ECHO noted. Pt with GIB (PTA witnessed episode of melena, stool guiac positive at [facility]), however EGD did not find source of bleeding, +duodenal mass; c-scope found no sources of bleeding with some diverticula; no s/s of bleed at this time. Pt s/p intubation in endo suite. GOC 2/15 pt to be Full Code. Per progress note 2/18 - became stuporous with minimal responsiveness, noted dementia, baseline AAOx1-2 + waxes and/wanes. Course further complicated by episodes of polymorphic ventricular tachycardia - EP consulted, not recs for ICD at this time.   Pt is now on 5 LA, has been being followed by SLP Since time in CCU. While in CCU noted various recs by SLP based on pt level of awake and alertness. Most recent SLP f/u 2/24 noted - recs for puree/thin liquids, ASP Precautions. Pt visited today, RN/PCA present. Current diet order for    puree/consistent CHO no snack diet. Breakfast appeared to be 25% consumed, no reports of oral issues / reported pt alert enough for meal however did not want to eat.   Spoke with team, Please see below for nutrition recommendations.    Previous Nutrition Diagnosis: Inadequate Energy Intake Etiology rt intake<EER Signs/Symptoms aeb NPO.   Active [   ]  Resolved [x]  Previous Nutrition Diagnosis: Inadequate PO intake RT decreased appetite, AMS, AEB 0% PO   Active [x]  Resolved [  ]  New PES: Difficulty swallowing RT AMS AEB SLP Recs for puree diet vs NPO   Active [x]  Resolved [  ]  Goal: Pt will meet at least 75% of nutrient needs via safe feasible route consistent with GOC     Recommendations:  1. Should PO diet remain - suggest use of regular diet, Glucerna x3 per day as oral nutrition supplements (220kcal/10gm prot per 1 can); Fluids per team; PO consistency per SLP.  2. Monitor %PO intake. Monitor diet tolerance. Recommend to Maintain ASP precautions.  >> Per SLP feeding when pt awake, alert, attentive, energized. In the event s/s of issues swallowing noted / pt not alert enough for meals, recommend NPO + SLP f/u.  >> Should pt PO intake continue to be affected by waxing/waning ? ablity to meet EER via PO alone; Should TF be indicated either d/t poor PO vs issues swallowing, Please reconsult for Recs PRN.   3. Monitor Skin, Labs, wts daily, GI disress, GOC.  4. MVI daily.  5. RD to remain available for additional nutrition interventions as needed.     Education: NA.     Risk Level: High [ X ] Moderate [   ] Low [   ].

## 2020-02-25 NOTE — PROGRESS NOTE ADULT - SUBJECTIVE AND OBJECTIVE BOX
EPS Progress Note  CC: AMS    S: No complaints.  (pt is not verbalizing anything during interview)     O: T(C): 36.1 (02-25-20 @ 14:31), Max: 36.6 (02-24-20 @ 19:00)  HR: 81 (02-25-20 @ 12:51) (62 - 81)  BP: 136/78 (02-25-20 @ 12:51) (114/57 - 137/61)  RR: 17 (02-25-20 @ 12:51) (15 - 21)  SpO2: 99% (02-25-20 @ 12:51) (95% - 99%)    TELE: NSR, NSVT 5 beats, PVCs. HR 60-70s.      PHYSICAL  Constitutional:  NAD        Neck: No JVD  Pulm:  CTA B/L, no wheeze or rale   Cardiac:   + s1/s2, RRR  GI:  +BS , soft ND/NT  Vascular: No LE edema, pulse 2+  Neuro: AAO x 1  Skin: Warm. No rash or lesion     LABS:                        12.4   9.32  )-----------( 323      ( 25 Feb 2020 06:12 )             43.1     02-25    153<H>  |  116<H>  |  37<H>  ----------------------------<  150<H>  4.4   |  23  |  1.10    Ca    9.4      25 Feb 2020 06:12  Phos  3.6     02-25  Mg     2.5     02-25          MEDICATIONS:  albuterol/ipratropium for Nebulization. 3 milliLiter(s) Nebulizer every 6 hours  aspirin  chewable 81 milliGRAM(s) Oral daily  atorvastatin 40 milliGRAM(s) Oral at bedtime  enoxaparin Injectable 30 milliGRAM(s) SubCutaneous every 24 hours  finasteride 5 milliGRAM(s) Oral daily  furosemide    Tablet 10 milliGRAM(s) Oral daily  glucagon  Injectable 1 milliGRAM(s) IntraMuscular once PRN  insulin lispro (HumaLOG) corrective regimen sliding scale   SubCutaneous Before meals and at bedtime  latanoprost 0.005% Ophthalmic Solution 1 Drop(s) Right EYE at bedtime  multivitamin/minerals 1 Tablet(s) Oral daily  pantoprazole    Tablet 40 milliGRAM(s) Oral before breakfast  polyethylene glycol 3350 17 Gram(s) Oral two times a day

## 2020-02-25 NOTE — PROGRESS NOTE ADULT - SUBJECTIVE AND OBJECTIVE BOX
CARDIOLOGY NP PROGRESS NOTE    Subjective: Pt seen and examined at bedside. No specific complaints. Alert to self, but does not know place or time.  Remainder ROS otherwise negative.    Overnight Events: EP consulted yesterday for polymorphic VT. R IJ TLC removed yesterday. Lovenox SQ initiated.    TELEMETRY: SR 70s, frequent PVCs, NSVT 5 beats overnight    EKG:      VITAL SIGNS:  T(C): 36.4 (02-25-20 @ 11:28), Max: 37.6 (02-24-20 @ 13:19)  HR: 79 (02-25-20 @ 08:52) (62 - 79)  BP: 137/61 (02-25-20 @ 08:52) (114/57 - 138/64)  RR: 16 (02-25-20 @ 08:52) (15 - 21)  SpO2: 97% (02-25-20 @ 08:52) (95% - 98%)  Wt(kg): --    I&O's Summary    24 Feb 2020 07:01  -  25 Feb 2020 07:00  --------------------------------------------------------  IN: 267 mL / OUT: 500 mL / NET: -233 mL          PHYSICAL EXAM:    General: Alert to self, No acute Distress  Neck: Supple, NO JVD  Cardiac: S1 S2, No M/R/G  Pulmonary: Lungs w/ bibasilar crackles, Breathing unlabored on RA, No Rhonchi/Wheezing  Abdomen: Soft, Non -tender, +BS x 4 quads  Extremities: No Rashes, No edema  Neuro: A/o x 1, No focal deficits          LABS:                          12.4   9.32  )-----------( 323      ( 25 Feb 2020 06:12 )             43.1                              02-25    153<H>  |  116<H>  |  37<H>  ----------------------------<  150<H>  4.4   |  23  |  1.10    Ca    9.4      25 Feb 2020 06:12  Phos  3.6     02-25  Mg     2.5     02-25                                CAPILLARY BLOOD GLUCOSE      POCT Blood Glucose.: 178 mg/dL (25 Feb 2020 12:10)  POCT Blood Glucose.: 139 mg/dL (25 Feb 2020 07:28)  POCT Blood Glucose.: 160 mg/dL (24 Feb 2020 21:29)  POCT Blood Glucose.: 139 mg/dL (24 Feb 2020 16:32)            Allergies:  No Known Allergies    MEDICATIONS  (STANDING):  albuterol/ipratropium for Nebulization. 3 milliLiter(s) Nebulizer every 6 hours  aspirin  chewable 81 milliGRAM(s) Oral daily  atorvastatin 40 milliGRAM(s) Oral at bedtime  chlorhexidine 2% Cloths 1 Application(s) Topical <User Schedule>  dextrose 5%. 250 milliLiter(s) (50 mL/Hr) IV Continuous <Continuous>  dextrose 5%. 1000 milliLiter(s) (50 mL/Hr) IV Continuous <Continuous>  dextrose 50% Injectable 12.5 Gram(s) IV Push once  dextrose 50% Injectable 25 Gram(s) IV Push once  dextrose 50% Injectable 25 Gram(s) IV Push once  enoxaparin Injectable 30 milliGRAM(s) SubCutaneous every 24 hours  finasteride 5 milliGRAM(s) Oral daily  furosemide    Tablet 10 milliGRAM(s) Oral daily  insulin lispro (HumaLOG) corrective regimen sliding scale   SubCutaneous Before meals and at bedtime  latanoprost 0.005% Ophthalmic Solution 1 Drop(s) Right EYE at bedtime  pantoprazole    Tablet 40 milliGRAM(s) Oral before breakfast  polyethylene glycol 3350 17 Gram(s) Oral two times a day    MEDICATIONS  (PRN):  dextrose 40% Gel 15 Gram(s) Oral once PRN Blood Glucose LESS THAN 70 milliGRAM(s)/deciliter  glucagon  Injectable 1 milliGRAM(s) IntraMuscular once PRN Glucose LESS THAN 70 milligrams/deciliter        DIAGNOSTIC TESTS:

## 2020-02-25 NOTE — PROVIDER CONTACT NOTE (OTHER) - SITUATION
Pt had  beats of VTACH, event was printed. Pt's /65 map of 93, VSS. pt states he is not in distress. Provider notified, and pt will be monitored closely.

## 2020-02-25 NOTE — PROGRESS NOTE ADULT - PROBLEM SELECTOR PLAN 4
Patient with systolic heart failure as EF 25% with global hypokinesis. Lungs CTA without LE edema.   - CXR this am with improvement in congestion. CXR AM, follow up.   - c/w PO Lasix 10mg qd   - Consider re-trial of beta blocker in setting of recent VTach episodes per EP recs  - continue to HOLD lisinopril in setting of tenuous BPs   - strict I/O's, daily weights.     #Pleural effusions   Effusions 2/2 HFrEF, improving.   - same plan as above.

## 2020-02-26 LAB
ANION GAP SERPL CALC-SCNC: 14 MMOL/L — SIGNIFICANT CHANGE UP (ref 5–17)
ANION GAP SERPL CALC-SCNC: 15 MMOL/L — SIGNIFICANT CHANGE UP (ref 5–17)
BUN SERPL-MCNC: 36 MG/DL — HIGH (ref 7–23)
BUN SERPL-MCNC: 38 MG/DL — HIGH (ref 7–23)
CALCIUM SERPL-MCNC: 9.2 MG/DL — SIGNIFICANT CHANGE UP (ref 8.4–10.5)
CALCIUM SERPL-MCNC: 9.3 MG/DL — SIGNIFICANT CHANGE UP (ref 8.4–10.5)
CHLORIDE SERPL-SCNC: 118 MMOL/L — HIGH (ref 96–108)
CHLORIDE SERPL-SCNC: 121 MMOL/L — HIGH (ref 96–108)
CO2 SERPL-SCNC: 21 MMOL/L — LOW (ref 22–31)
CO2 SERPL-SCNC: 23 MMOL/L — SIGNIFICANT CHANGE UP (ref 22–31)
CREAT SERPL-MCNC: 1.03 MG/DL — SIGNIFICANT CHANGE UP (ref 0.5–1.3)
CREAT SERPL-MCNC: 1.19 MG/DL — SIGNIFICANT CHANGE UP (ref 0.5–1.3)
GLUCOSE BLDC GLUCOMTR-MCNC: 141 MG/DL — HIGH (ref 70–99)
GLUCOSE BLDC GLUCOMTR-MCNC: 142 MG/DL — HIGH (ref 70–99)
GLUCOSE BLDC GLUCOMTR-MCNC: 170 MG/DL — HIGH (ref 70–99)
GLUCOSE BLDC GLUCOMTR-MCNC: 196 MG/DL — HIGH (ref 70–99)
GLUCOSE SERPL-MCNC: 149 MG/DL — HIGH (ref 70–99)
GLUCOSE SERPL-MCNC: 155 MG/DL — HIGH (ref 70–99)
HCT VFR BLD CALC: 39.2 % — SIGNIFICANT CHANGE UP (ref 39–50)
HGB BLD-MCNC: 12.6 G/DL — LOW (ref 13–17)
MAGNESIUM SERPL-MCNC: 2.6 MG/DL — SIGNIFICANT CHANGE UP (ref 1.6–2.6)
MCHC RBC-ENTMCNC: 32.1 GM/DL — SIGNIFICANT CHANGE UP (ref 32–36)
MCHC RBC-ENTMCNC: 33.2 PG — SIGNIFICANT CHANGE UP (ref 27–34)
MCV RBC AUTO: 103.2 FL — HIGH (ref 80–100)
NRBC # BLD: 0 /100 WBCS — SIGNIFICANT CHANGE UP (ref 0–0)
PLATELET # BLD AUTO: 237 K/UL — SIGNIFICANT CHANGE UP (ref 150–400)
POTASSIUM SERPL-MCNC: 3.6 MMOL/L — SIGNIFICANT CHANGE UP (ref 3.5–5.3)
POTASSIUM SERPL-MCNC: 4.1 MMOL/L — SIGNIFICANT CHANGE UP (ref 3.5–5.3)
POTASSIUM SERPL-SCNC: 3.6 MMOL/L — SIGNIFICANT CHANGE UP (ref 3.5–5.3)
POTASSIUM SERPL-SCNC: 4.1 MMOL/L — SIGNIFICANT CHANGE UP (ref 3.5–5.3)
RBC # BLD: 3.8 M/UL — LOW (ref 4.2–5.8)
RBC # FLD: 19 % — HIGH (ref 10.3–14.5)
SODIUM SERPL-SCNC: 154 MMOL/L — HIGH (ref 135–145)
SODIUM SERPL-SCNC: 158 MMOL/L — HIGH (ref 135–145)
WBC # BLD: 9.94 K/UL — SIGNIFICANT CHANGE UP (ref 3.8–10.5)
WBC # FLD AUTO: 9.94 K/UL — SIGNIFICANT CHANGE UP (ref 3.8–10.5)

## 2020-02-26 PROCEDURE — 99232 SBSQ HOSP IP/OBS MODERATE 35: CPT

## 2020-02-26 PROCEDURE — 71045 X-RAY EXAM CHEST 1 VIEW: CPT | Mod: 26

## 2020-02-26 RX ORDER — SODIUM CHLORIDE 9 MG/ML
250 INJECTION, SOLUTION INTRAVENOUS
Refills: 0 | Status: DISCONTINUED | OUTPATIENT
Start: 2020-02-26 | End: 2020-02-27

## 2020-02-26 RX ORDER — SODIUM CHLORIDE 9 MG/ML
500 INJECTION, SOLUTION INTRAVENOUS
Refills: 0 | Status: DISCONTINUED | OUTPATIENT
Start: 2020-02-26 | End: 2020-02-27

## 2020-02-26 RX ADMIN — Medication 10 MILLIGRAM(S): at 06:10

## 2020-02-26 RX ADMIN — LATANOPROST 1 DROP(S): 0.05 SOLUTION/ DROPS OPHTHALMIC; TOPICAL at 21:38

## 2020-02-26 RX ADMIN — ENOXAPARIN SODIUM 30 MILLIGRAM(S): 100 INJECTION SUBCUTANEOUS at 11:24

## 2020-02-26 RX ADMIN — Medication 2: at 12:52

## 2020-02-26 RX ADMIN — Medication 3 MILLILITER(S): at 12:52

## 2020-02-26 RX ADMIN — SODIUM CHLORIDE 50 MILLILITER(S): 9 INJECTION, SOLUTION INTRAVENOUS at 18:44

## 2020-02-26 RX ADMIN — Medication 3 MILLILITER(S): at 17:44

## 2020-02-26 RX ADMIN — PANTOPRAZOLE SODIUM 40 MILLIGRAM(S): 20 TABLET, DELAYED RELEASE ORAL at 06:10

## 2020-02-26 RX ADMIN — ATORVASTATIN CALCIUM 40 MILLIGRAM(S): 80 TABLET, FILM COATED ORAL at 21:38

## 2020-02-26 RX ADMIN — Medication 1 TABLET(S): at 11:27

## 2020-02-26 RX ADMIN — CARVEDILOL PHOSPHATE 3.12 MILLIGRAM(S): 80 CAPSULE, EXTENDED RELEASE ORAL at 06:10

## 2020-02-26 RX ADMIN — Medication 3 MILLILITER(S): at 06:10

## 2020-02-26 RX ADMIN — Medication 81 MILLIGRAM(S): at 11:30

## 2020-02-26 RX ADMIN — SODIUM CHLORIDE 83.33 MILLILITER(S): 9 INJECTION, SOLUTION INTRAVENOUS at 11:19

## 2020-02-26 RX ADMIN — FINASTERIDE 5 MILLIGRAM(S): 5 TABLET, FILM COATED ORAL at 11:27

## 2020-02-26 RX ADMIN — Medication 2: at 21:38

## 2020-02-26 NOTE — PROGRESS NOTE ADULT - PROBLEM SELECTOR PLAN 4
Patient with systolic heart failure as EF 25% with global hypokinesis. Lungs CTA without LE edema.   - CXR this am with improvement in congestion. CXR AM, follow up.   - c/w PO Lasix 10mg qd   - Coreg initiated 2/25 and patient with frequent PVCs noted on tele, consider discontinuing as patient has failed beta blocker trials this admission  - continue to HOLD lisinopril in setting of tenuous BPs   - strict I/O's, daily weights.     #Pleural effusions   Effusions 2/2 HFrEF, improving.   - same plan as above.

## 2020-02-26 NOTE — DISCHARGE NOTE PROVIDER - HOSPITAL COURSE
87M PMH CAD (CABG 10y ago, 2/3 grafts occluded), dementia (AAOx2), DM-II, presenting to [place] 2/9/20 with AMS (more confused than baseline per wife and HHA), found with melanotic BM and elevated troponins (11.2) concerning for NSTEMI (EKG w/right bundle, ST depressions in anterolateral leads) vs demand ischemia 2/2 GI bleed; transferred to [facility] CCU for further care on 2/10. Echo demonstrated EF of 25%. Dobutamine gtt was initiated for cardiogenic shock/hypovolemic shock. Cardiac enzymes continued to uptrend; however management of NSTEMI complicated by continued melanotic BM’s requiring pRBC transfusion (total 2 units). Patient was intubated for C-scope and EGD, which did not identify source of bleeding; incidental duodenal mass visualized; pathology of mass remains unknown; extubated s/p scopes. Dobutamine gtt was discontinued. Initial attempt to initiate ASA, Plavix, heparin gtt was complicated by Hgb drop necessitating discontinuation of NSTEMI medical management. Upon continued monitoring of Hgb, ASA was initiated; tolerated well. Cardiac intervention was not pursued given inability to tolerate required post-procedure anticoagulation/DAPT; CCTA was not pursued given inability to tolerate beta blockade. For HFrEF, patient demonstrating improvement in fluid status with Lasix IV dosed per clinical presentation; diuresis complicated by asymptomatic hypernatremia now resolving; diuresis to continue with Lasix 10mg PO qd. Addition of beta blocker complicated by episodes of polymorphic ventricular tachycardia; holding beta blocker; EP consulted 87M PMH CAD (CABG 10y ago, 2/3 grafts occluded), dementia (AAOx2), DM-II, presenting to [place] 2/9/20 with AMS (more confused than baseline per wife and HHA), found with melanotic BM and elevated troponins (11.2) concerning for NSTEMI (EKG w/right bundle, ST depressions in anterolateral leads) vs demand ischemia 2/2 GI bleed; transferred to [facility] CCU for further care on 2/10. Echo demonstrated EF of 25%. Dobutamine gtt was initiated for cardiogenic shock/hypovolemic shock. Cardiac enzymes continued to uptrend; however management of NSTEMI complicated by continued melanotic BM’s requiring pRBC transfusion (total 2 units). Patient was intubated for C-scope and EGD, which did not identify source of bleeding; incidental duodenal mass visualized; pathology of mass remains unknown; extubated s/p scopes. Dobutamine gtt was discontinued. Initial attempt to initiate ASA, Plavix, heparin gtt was complicated by Hgb drop necessitating discontinuation of NSTEMI medical management. Upon continued monitoring of Hgb, ASA was initiated; tolerated well. Cardiac intervention was not pursued given inability to tolerate required post-procedure anticoagulation/DAPT; CCTA was not pursued given inability to tolerate beta blockade.         For HFrEF, patient demonstrating improvement in fluid status with Lasix IV dosed per clinical presentation; diuresis complicated by asymptomatic hypernatremia, he received D5W gentle hydration, and diuresis continue with Lasix 10mg PO qd. Trial for addition of beta blocker was complicated by episodes of polymorphic ventricular tachycardia and prolonged QTc. EP was consulted and attempted to start Coreg, but developed frequent PVCs and was D/c'ed.         PT evaluation rec [facility]. 87M PMH CAD (CABG 10y ago, 2/3 grafts occluded), dementia (AAOx2), DM-II, presenting to [place] 2/9/20 with AMS (more confused than baseline per wife and HHA), found with melanotic BM and elevated troponins (11.2) concerning for NSTEMI (EKG w/right bundle, ST depressions in anterolateral leads) vs demand ischemia 2/2 GI bleed; transferred to [facility] CCU for further care on 2/10. Echo demonstrated EF of 25%. Dobutamine gtt was initiated for cardiogenic shock/hypovolemic shock. Cardiac enzymes continued to uptrend; however management of NSTEMI complicated by continued melanotic BM’s requiring pRBC transfusion (total 2 units). Patient was intubated for C-scope and EGD, which did not identify source of bleeding; incidental duodenal mass visualized; pathology of mass remains unknown; extubated s/p scopes. Dobutamine gtt was discontinued. Initial attempt to initiate ASA, Plavix, heparin gtt was complicated by Hgb drop necessitating discontinuation of NSTEMI medical management. Upon continued monitoring of Hgb, ASA was initiated; tolerated well. Cardiac intervention was not pursued given inability to tolerate required post-procedure anticoagulation/DAPT; CCTA was not pursued given inability to tolerate beta blockade.         For HFrEF, patient demonstrating improvement in fluid status with Lasix IV dosed per clinical presentation; diuresis complicated by asymptomatic hypernatremia, for which he received D5W gentle hydration, and diuresis continue with Lasix 10mg PO qd and hypernatremia improving. Trial for addition of beta blocker was complicated by episodes of polymorphic ventricular tachycardia and prolonged QTc. EP was consulted and failed trial of Coreg, as pt developed frequent PVCs.          PT evaluation rec [facility]. Pt remains hemodynamically stable and discharged to [facility].

## 2020-02-26 NOTE — DISCHARGE NOTE PROVIDER - NSDCCPCAREPLAN_GEN_ALL_CORE_FT
PRINCIPAL DISCHARGE DIAGNOSIS  Diagnosis: NSTEMI (non-ST elevated myocardial infarction)  Assessment and Plan of Treatment: The heart requires a supply of blood. The blood supply to the heart is provided by blood vessels called the coronary arteries. Myocardial infarction, or MI (commonly known as a "heart attack"), is damage or death of part of the heart muscle. The damage is caused by lack of blood flow through the coronary arteries.  When you presented to the hospital your troponin levels were elevated.  Troponin is a highly sensitive biomarker of myocardial cell damage (injury), which is often, but not always, due to ischemic heart disease. It was decided to treat this wiith a conservative approach. Please continue your medications as prescribed.         SECONDARY DISCHARGE DIAGNOSES  Diagnosis: GI bleed  Assessment and Plan of Treatment: When you presetend to the hospital you were having bloody bowel movements. For this reason, a scope was performed which did not confirm the source of bleeding. Your blood levels were also noted to be low secondary to the bleeding for whcih you received blood transfusions and your levels are now within normal limits. PRINCIPAL DISCHARGE DIAGNOSIS  Diagnosis: NSTEMI (non-ST elevated myocardial infarction)  Assessment and Plan of Treatment: You came into the hospital and was found to have a heart attack. You were treated medically for this conservatively due to gastrointestinal bleeding while in the hospital. Continue taking medications Aspirin and Lipitor as prescribed.      SECONDARY DISCHARGE DIAGNOSES  Diagnosis: Acute on chronic systolic heart failure  Assessment and Plan of Treatment: You were found to have fluid in the lungs due to congestive heart failure, which means your heart's function is low. An echocardiogram was done and found your heart function  or Ejection Fraction at 25%. You were given diuretic medications to help get rid of the fluid. Please continue taking Lasix 10mg daily to prevent reaccumulation of fluid.    Diagnosis: Ventricular tachycardia, polymorphic  Assessment and Plan of Treatment: You were found to have nonsustained ventricular tachycardia, which is an irregular heart rhythm that can be dangerous. You were seen by the Electrophysiologist and was found to have Prolonged QTc findings on EKG which could've lead to this. You were taking off medications that can exacerbate this. You were trialed on medications called beta blockers to help control ventricular tachycardia, but you did not tolerate this while in the hospital.    Diagnosis: GI bleed  Assessment and Plan of Treatment: When you presented to the hospital you were having bloody bowel movements. For this reason, endoscopy and colonscopy was performed which did not confirm the source of bleeding. Your blood levels were also noted to be low secondary to the bleeding for which you received blood transfusions and your levels are now within normal limits.    Diagnosis: Hypernatremia  Assessment and Plan of Treatment: You were found to have abnormally high sodium levels while in the hospital. This is likely due to not drinking much water while in the hospital. Please maintain adequate hydration of water every day. You may drink up to 1.5 liters of fluids per day.

## 2020-02-26 NOTE — DISCHARGE NOTE PROVIDER - NSDCMRMEDTOKEN_GEN_ALL_CORE_FT
Aspirin Enteric Coated 81 mg oral delayed release tablet: 1 tab(s) orally once a day  atorvastatin 20 mg oral tablet: 1 tab(s) orally once a day  donepezil 5 mg oral tablet: 1 tab(s) orally once a day (at bedtime)  dutasteride 0.5 mg oral capsule: 1 cap(s) orally once a day  enalapril 2.5 mg oral tablet: 1 tab(s) orally once a day  latanoprost 0.005% ophthalmic solution: 1 drop(s) to each affected eye once a day (in the evening)  memantine 10 mg oral tablet: 1 tab(s) orally 2 times a day  metFORMIN 500 mg oral tablet: 1 tab(s) orally 2 times a day  metoprolol succinate 25 mg oral tablet, extended release: 1 tab(s) orally once a day  Plavix 75 mg oral tablet: 1 tab(s) orally once a day  tamsulosin 0.4 mg oral capsule: 1 cap(s) orally once a day  tolterodine 4 mg oral capsule, extended release: 1 cap(s) orally once a day Aspirin Enteric Coated 81 mg oral delayed release tablet: 1 tab(s) orally once a day  atorvastatin 40 mg oral tablet: 1 tab(s) orally once a day (at bedtime)  dutasteride 0.5 mg oral capsule: 1 cap(s) orally once a day  furosemide: 10 milligram(s) orally once a day  latanoprost 0.005% ophthalmic solution: 1 drop(s) to each affected eye once a day (in the evening)  metFORMIN 500 mg oral tablet: 1 tab(s) orally 2 times a day  Multiple Vitamins with Minerals oral tablet: 1 tab(s) orally once a day  pantoprazole 40 mg oral delayed release tablet: 1 tab(s) orally once a day (before a meal)  polyethylene glycol 3350 oral powder for reconstitution: 17 gram(s) orally 2 times a day

## 2020-02-26 NOTE — DISCHARGE NOTE PROVIDER - PROVIDER TOKENS
PROVIDER:[TOKEN:[[id number]],FOLLOWUP:[2 weeks]] PROVIDER:[TOKEN:[[id number]],SCHEDULEDAPPT:[03/09/2020],SCHEDULEDAPPTTIME:[01:15 PM]]

## 2020-02-26 NOTE — PROGRESS NOTE ADULT - ATTENDING COMMENTS
Pt is an 86 y/o man c CAD s/p CABG who p/w AMS, GIB, NSTEMI and shock.    Pt's BP 80s with melana documented at [place]. 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 No evidence of acute bleed  Colon: no source of bleed    afeb, HR 85, 97/54, 99%    Pt extubated    Hgb 9.8 --> 10.7 (s/p PRBC) --> 9 --> 9.7 --> 8.7  Plt 176    Lac 6.5 --> 4.3 --> 2.9 --> 1.9 --> 2.3  Cr 0.99 --> 1.06  Yady 1.17 --> 2.72  CK MB 53 --> 33.2    CXR: pulm edema B/L  ECG: ST @ 120, RBBB, LAFB, improving ST depressions today    - pt with acute systolic CHF now in cardiogenic shock  - dobutamine gtt d/c'd, lactate cleared  - transduce CVP, CVP 11 this morning and pt breathing labored and in acute respiratory failure  - will check abg and provide hi flow or cpap as necessary  - new respiratory failure may be secondary to dobutamine gtt discontinuation  - will attempt to diurese pt to avoid intubation or restart of dobutamine gtt  - f/u c GI, pt with type II MI from GI bleed as pt with melana  - EGD revealed mass and no source on colon, prob intermittent bleed from duodenal mass  - spoke to family re: pt's clinical trajectory and prognosis  - pt to cont as full code and family understands  - cont lipitor 40mg  - hold other meds while in shock (BB/ACE)  - cont f/u Lac, BP, and Hgb .
Pt is an 86 y/o man c CAD s/p CABG who p/w AMS, GIB, NSTEMI and shock.    Pt's BP 80s with melana documented at [place]. 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 No evidence of acute bleed  Colon: no source of bleed    afeb, HR 93, 101/56, 99%    Pt extubated today    Hgb 9.8 --> 10.7 (s/p PRBC) --> 9 --> 9.7 --> 8.8  Plt 130    Lac 6.5 --> 4.3 --> 2.9 --> 1.9 --> 2.3  Cr 0.99 --> 1.01 --> 0.95  Yady 1.17 --> 2.72  CK MB 53 --> 33.2    CXR: pulm edema B/L  ECG: ST @ 120, RBBB, LAFB, worsening ST depressions today    - pt with acute systolic CHF now in cardiogenic shock  - cont dobutamine gtt, lactate cleared  - transduce CVP, CVP 8 this morning  - f/u c GI, pt with type II MI from GI bleed as pt with melana  - EGD revealed mass and no source on colon  - mental status much improved this morning, can d/c bipap in pt  - pt intubated for respiratory failure from GI bleed  - difficulty is that pt is still in shock s dobutamine and increased doses of doubutamine are causing increased ectopy with concomitant hypotension  - lower doses of dobutamine may ameliorate ectopy but worsen shock  - pt may need a swan to help manage acute systolic failure/cardiogenic shock  - spoke to family re: pt's clinical trajectory and prognosis  - pt to cont as full code and family understands  - cont lipitor 40mg  - hold other meds while in shock (BB/ACE)  - cont f/u Lac, BP, and Hgb .
87M w/ pmh of CAD s/p CABG, HTN, HLD, DM, advanced dementia p/w NSTEMI c/w cardiogenic shock and GI bleed, admitted to CCU for further mgmt    -NSTEMI: ACS vs Type II MI in the setting of GI Bleed; Significant GI bleed and unclear source despite EGD/C-scope -> Plan is to manage CAD conservatively; c/w ASA and Lipitor  -sCHF - EF ~ 25% w/ global hypokinesis; approaching euvolemia on exam -has improved w/ diuresis; c/w Lasix 20 IV today, transition to Lasix 40PO daily starting tmrw; c/w Lopressor 12.5 BID  -GI: GI Bleed - s/p EGD/C-scope, both unrevealing ->unclear source of initial GI bleed, but H&H remains stable; c/w Aspirin 81 daily; c/w PPI  -DASH/Puree diet; Encourage PO/Free H2O intake  -DVT PPx  -OOB to chair / PT   -Dispo: CCU - Likely SD to cardiac tele tmrw  -DNR/DNI    Armando Guerrero MD  CCU Attending
87M w/ pmh of CAD s/p CABG, HTN, HLD, DM, advanced dementia p/w NSTEMI c/w cardiogenic shock and GI bleed, admitted to CCU for further mgmt    -NSTEMI: ACS vs Type II MI in the setting of GI Bleed; Significant GI bleed and unclear source despite EGD/C-scope -> Plan is to manage CAD conservatively; c/w ASA and Lipitor  -sCHF - EF ~ 25% w/ global hypokinesis; approaching euvolemia on exam -has improved significantly w/ diuresis including better BPs and more alert in general. Diuresis only limited by Hypernatremia. Will give Lasix 20 IV today pending 2PM BMP; No BB for now and pt. did not tolerate Lisinopril 2.5 earlier in CCU admission   -GI: GI Bleed - s/p EGD/C-scope, both unrevealing ->unclear source of initial GI bleed, but H&H remains stable; c/w Aspirin 81 daily; c/w PPI  -DASH/Puree diet; Encourage PO/Free H2O intake  -DVT PPx  -OOB to chair / PT eval  -Dispo: CCU  -DNR/DNI    Armando Guerrero MD  CCU Attending
Pt is an 86 y/o man c CAD s/p CABG who p/w AMS, ?GIB, NSTEMI and shock.    Pt's BP 80s with melana documented at [place]. SBP resolved with IVF    CABG: LIMA --> LAD ('02)  SVG D1/OM2/PDA (occluded OM2, PDA)    ECG: nsr @ 86 c RBBB, 2-3 downsloping ST I, II, AVL, V4-V6, AVR elevation     Pt with melana this morning    101.2, , 97/57, 96% 2l NC    Bipap overnt    WBC 14.8  Hgb 9.8 --> 10.7 (s/p PRBC) --> 9  Plt 148    AST 99  ALT 55  Lac 6.5 --> 4.3 --> 2.9  Cr 0.99 --> 1.18  Yady 1.17 --> 2.72  CK MB 53 --> 33.2    CXR: pulm edema B/L    - pt with acute systolic CHF now in cardiogenic shock  - cont dobutamine gtt, f/u lactate clearance  - transduce CVP, CVP 2 this morning  - f/u c GI, pt prob having a type II MI from GI bleed as pt with melana  - pt also with dementia with agitation which can complicate support devices if need be  - will transfuse another PRBC and have consulted with GI re urgent EGD  - spoke to family re: pt's clinical trajectory and prognosis  - pt to cont as full code and family understands that pt may need to be intubated for EGD  - cont lipitor 40mg  - hold other meds while in shock (BB/ACE)  - cont f/u Lac, BP, and Hgb .
Pt is an 86 y/o man c CAD s/p CABG who p/w AMS, GIB, NSTEMI and shock.    Pt's BP 80s with melana documented at [place]. 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 No evidence of acute bleed  Colon: no source of bleed    afeb, HR 93, 101/56, 99%    Pt extubated today    Hgb 9.8 --> 10.7 (s/p PRBC) --> 9 --> 9.7 --> 8.8  Plt 130    Lac 6.5 --> 4.3 --> 2.9 --> 1.9 --> 2.3  Cr 0.99 --> 1.01 --> 0.95  Yady 1.17 --> 2.72  CK MB 53 --> 33.2    CXR: pulm edema B/L  ECG: ST @ 120, RBBB, LAFB, worsening ST depressions today    - pt with acute systolic CHF now in cardiogenic shock  - cont dobutamine gtt, lactate cleared  - transduce CVP, CVP 8 this morning  - f/u c GI, pt with type II MI from GI bleed as pt with melana  - EGD revealed mass and no source on colon  - mental status much improved this morning, can d/c bipap in pt  - pt intubated for respiratory failure from GI bleed  - difficulty is that pt is still in shock s dobutamine and increased doses of doubutamine are causing increased ectopy with concomitant hypotension  - lower doses of dobutamine may ameliorate ectopy but worsen shock  - pt may need a swan to help manage acute systolic failure/cardiogenic shock  - spoke to family re: pt's clinical trajectory and prognosis  - pt to cont as full code and family understands  - cont lipitor 40mg  - hold other meds while in shock (BB/ACE)  - cont f/u Lac, BP, and Hgb .
Pt is an 88 y/o man c CAD s/p CABG who p/w AMS, GIB, NSTEMI and shock.    Pt's BP 80s with melana documented at [place]. 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 No evidence of acute bleed  Colon: no source of bleed    afeb, HR 88, 96/54, 99%    Hgb 9.8 --> 10.7 (s/p PRBC) --> 9 --> 9.7 --> 8.7    Lac 6.5 --> 4.3 --> 2.9 --> 1.9 --> 2.3  Cr 0.99 --> 1.08  Yady 1.17 --> 2.72  CK MB 53 --> 33.2    CXR: pulm edema B/L  ECG: ST @ 120, RBBB, LAFB, continued ST depressions today    - pt with acute systolic CHF now in cardiogenic shock  - dobutamine gtt d/c'd, lactate cleared  - transduce CVP, CVP still 11 this morning  - pt breathing labored yesterday and in acute respiratory failure and was placed on Hi flow NC  - new respiratory failure may be secondary to dobutamine gtt discontinuation  - will cont to attempt to diurese pt to avoid intubation or restart of dobutamine gtt  - f/u c GI, pt with type II MI from GI bleed as pt with melana  - EGD revealed mass and no source on colon, prob intermittent bleed from duodenal mass  - consider cCT to define anatomy to evaluate if there is a role in POBA in pt to have definitive rx for GI bleed (presumptive source duodenal mass)  - spoke to family re: pt's clinical trajectory and prognosis  - pt to cont as full code and family understands  - cont lipitor 40mg  - hold other meds while in shock (BB/ACE)  - cont f/u Lac, BP, and Hgb .
87M w/ pmh of CAD s/p CABG, HTN, HLD, DM, advanced dementia p/w NSTEMI c/w cardiogenic shock and GI bleed, admitted to CCU for further mgmt    -NSTEMI: ACS vs Type II MI in the setting of GI Bleed; Given significant GI bleed and unclear source despite EGD/C-scope -> Plan is to manage CAD conservatively; c/w ASA and Lipitor  -sCHF - EF ~ 25% w/ global hypokinesis; currently volume up on exam but improving - c/w Lasix 40 IV BID; hold Lisinopril 2.5mg today to allow BP for diuresis. Will eventually start Lisinopril and Toprol if BP tolerates  -GI: GI Bleed - s/p EGD/C-scope, both unrevealing ->unclear source of initial GI bleed, but H&H remains stable; c/w Aspirin 81 daily; c/w PPI, change to PO BID  -Pulm - Mildly Vol overloaded 2/2 sCHF  - c/w IV diuresis and wean HFNC to NC  -ID - Intermittent low grade fevers - likely non-infectious; Now afebrile over last 24hrs; Last day of abx today  -DASH/Puree diet; Encourage PO intake  -DVT PPx  -OOB to chair / PT eval  -Dispo: CCU  -DNR/DNI    Armando Guerrero MD  CCU Attending
87M w/ pmh of CAD s/p CABG, HTN, HLD, DM, advanced dementia p/w NSTEMI c/w cardiogenic shock and GI bleed, admitted to CCU for further mgmt    -NSTEMI: ACS vs Type II MI in the setting of GI Bleed; Troponins have peaked and trended down; Given significant GI bleed and unclear source despite EGD/C-scope -> Plan is to manage CAD conservatively; Pt. has tolerated re-instituting ASA 81 daily - will consider CCTA tmrw to see if there is a role POBA  -sCHF - EF ~ 25% w/ global hypokinesis; currently volume overloaded on exam but improving - c/w Lasix 40 IV daily, will start Lisinopril 2.5mg daily, and will consider low dose BB tmrw if BP tolerates  -Pulm - Vol overloaded 2/2 sCHF +/- less likely PNA; c/w IV diuresis; Last day of Abx tmrw  -ID - Intermittent low grade fevers; Nearly completed full course of Ceftriaxone/Azithro for ?CAP; Pt. continues to have low grade fevers, but is non-toxic appearing, HD stable and only mildly elevated WBC -> Blood and Urine Cxs negative; Likely non-infectious fevers - possibly 2/2 MI vs occult tumor(Duodenal mass seen on EGD) - will continue to monitor  -DASH diet  -DVT PPx  -OOB to chair / PT eval  -Dispo: CCU  -Full Code    Armando Guerrero MD  CCU Attending
Pt is an 88 y/o man c CAD s/p CABG who p/w AMS, ?GIB, NSTEMI and shock.    Pt's BP 80s with melana documented at [place]. 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 No evidence of acute bleed    afeb, , 113/62, 100%    Pt intubated yesterday bc of GI bleed/EGD/tachypnea  AC//5 12/18 40%    WBC 14.8  Hgb 9.8 --> 10.7 (s/p PRBC) --> 9 --> 9.7  Plt 123    AST 99  ALT 55  Lac 6.5 --> 4.3 --> 2.9 --> 1.9  Cr 0.99 --> 1.01  Yady 1.17 --> 2.72  CK MB 53 --> 33.2    CXR: pulm edema B/L  ECG: ST @ 120, RBBB, LAFB, 1AVB    - pt with acute systolic CHF now in cardiogenic shock  - cont dobutamine gtt, f/u lactate clearance  - transduce CVP, CVP 8 this morning  - f/u c GI, pt with type II MI from GI bleed as pt with melana  - EGD revealed mass and pt will go for colonoscopy today  - pt also with dementia with agitation which can complicate support devices if need be, but can consider if pt remains unstable and is intubated  - pt intubated for respiratory failure from GI bleed  - spoke to family re: pt's clinical trajectory and prognosis  - pt to cont as full code and family understands that pt may need to be intubated for EGD  - cont lipitor 40mg  - hold other meds while in shock (BB/ACE)  - cont f/u Lac, BP, and Hgb .
87M w/ pmh of CAD s/p CABG, HTN, HLD, DM, advanced dementia p/w NSTEMI c/w cardiogenic shock and GI bleed, admitted to CCU for further mgmt    -NSTEMI: ACS vs Type II MI in the setting of GI Bleed; Given significant GI bleed and unclear source despite EGD/C-scope -> Plan is to manage CAD conservatively; c/w ASA and Lipitor  -sCHF - EF ~ 25% w/ global hypokinesis; currently volume up on exam but much improved - c/w Lasix 40 IV BID - diuresing very well; Will enourage PO intake to balance out hypernatremia as we diurese; Will consider low dose BB tmrw if BP tolerates  -GI: GI Bleed - s/p EGD/C-scope, both unrevealing ->unclear source of initial GI bleed, but H&H remains stable; c/w Aspirin 81 daily; c/w PPI  -DASH/Puree diet; Encourage PO intake  -DVT PPx  -OOB to chair / PT eval  -Dispo: CCU  -DNR/DNI    Armando Guerrero MD  CCU Attending
87M w/ pmh of CAD s/p CABG, HTN, HLD, DM, advanced dementia p/w NSTEMI c/w cardiogenic shock and GI bleed, admitted to CCU for further mgmt    -NSTEMI: ACS vs Type II MI in the setting of GI Bleed; Significant GI bleed and unclear source despite EGD/C-scope -> Plan is to manage CAD conservatively; c/w ASA and Lipitor  -sCHF - EF ~ 25% w/ global hypokinesis; approaching euvolemia on exam -has improved significantly w/ diuresis; c/w Lasix 20 IV today, transition to Lasix 40PO daily starting tmrw; Will start Lopressor 12.5 BID; Pt. did not tolerate ACEinh before, will consider re-starting if BP tolerates lopressor  -GI: GI Bleed - s/p EGD/C-scope, both unrevealing ->unclear source of initial GI bleed, but H&H remains stable; c/w Aspirin 81 daily; c/w PPI  -DASH/Puree diet; Encourage PO/Free H2O intake  -DVT PPx  -OOB to chair / PT   -Dispo: SD to Cardiac Tele  -DNR/DNI    Armando Guerrero MD  CCU Attending
87M w/ pmh of CAD s/p CABG, HTN, HLD, DM, advanced dementia p/w NSTEMI c/w cardiogenic shock and GI bleed, admitted to CCU for further mgmt    -NSTEMI: ACS vs Type II MI in the setting of GI Bleed; Troponins have peaked and Cardiogenic shock picture has now resolved(weaned off ) and extubated yesterday; However, given unclear source of GI bleed despite EGD/C-Scope, resolution of cardiogenic shock, frailty and advanced dementia -> risk benefit favors conservative approach to CAD; c/w Lipitor for now and will consider Aspirin challenge later this admission. Cuurently Electrically and HD stable  -sCHF - EF ~ 25% w/ global hypokinesis; currently volume overloaded on exam but remains WWP w/ good MAPs, and negative lactate off ; Start Lasix 20IV BID, assess UOP and adjust diuresis as needed; IF BP tolerates would consider low dose ACE/ARB possibly tmrw; Eventually would start a low dose BB once euvolemic  -Pulm - Productive cough, intermittent low grade fevers and elevated WBC -> check blood and sputum Cx's; low suspicion for PNA but given pt.'s frail nature and recent shock picture - will Start Ceftriaxone for 5 days and Azithromycin for 3 days; Will also initiate IV diuresis  -DASH diet  -DVT PPx  -OOB to chair  -Dispo: CCU  -Full Code; Will need to readdress [facility]    Armando Guerrero MD  CCU Attending
87M w/ pmh of CAD s/p CABG, HTN, HLD, DM, advanced dementia p/w NSTEMI c/w cardiogenic shock and GI bleed, admitted to CCU for further mgmt    -NSTEMI: ACS vs Type II MI in the setting of GI Bleed; Troponins have peaked and trended down; Given significant GI bleed and unclear source despite EGD/C-scope -> Plan is to manage CAD conservatively; Pt. is tolerating ASA 81 daily - Had initially considered CCTA and possible POBA if amenable lesion found - however pt. w/ borderline MAPs - clinically not appropriate for CCTA, would likely decompensate w/ even low dose BB, may consider at a later date, but overall favor conservative mgmt of CAD; c/w ASA and Lipitor  -sCHF - EF ~ 25% w/ global hypokinesis; currently volume up on exam but improving - decrease Lasix to 20 IV daily, given poor PO intake and Hypernatremia on labs, c/w Lisinopril 2.5mg daily, and will consider low dose BB tmrw if BP tolerates  -GI: GI Bleed - s/p EGD/C-scope, both unrevealing ->unclear source of initial GI bleed, but H&H has remained stable; currently tolerating Aspirin daily; c/w PPI, would transition to PO BID  -Pulm - Mildly Vol overloaded 2/2 sCHF +/- less likely PNA; currently tolerating NC; c/w IV diuresis; Last day of Abx today  -ID - Intermittent low grade fevers - likely non-infectious; Now afebrile over last 24hrs; Last day of abx today  -DASH diet  -DVT PPx  -OOB to chair / PT eval  -Dispo: CCU  -Full Code; Overall poor prognosis    Armando Guerrero MD  CCU Attending
87M w/ pmh of CAD s/p CABG, HTN, HLD, DM, advanced dementia p/w NSTEMI c/w cardiogenic shock and GI bleed, admitted to CCU for further mgmt    Overnight course complicated by VT. Unable to tolerate BB in the setting of QT prolong.    134/60   HR 60-76  RR 22  T  97.6     A&O x1  RRR s1  s2  CTA B  NST soft + BS   no edema    K 4.2  Cr  1.0  Hg  11   plts  322  wbc  12    81 M CAD NSTEMI cardiogenic shock GIB dementia with VT.  1. EP consult. Hold BB for now.   2. Will monitor QT.  3. Continue ASA and statin for CAD.  4. Monitor Na.   5. Monitor H/H.    -NSTEMI: ACS vs Type II MI in the setting of GI Bleed; Significant GI bleed and unclear source despite EGD/C-scope -> Plan is to manage CAD conservatively; c/w ASA and Lipitor  -sCHF - EF ~ 25% w/ global hypokinesis; approaching euvolemia on exam -has improved significantly w/ diuresis; c/w Lasix 20 IV today, transition to Lasix 40PO daily starting tmrw; Will start Lopressor 12.5 BID; Pt. did not tolerate ACEinh before, will consider re-starting if BP tolerates lopressor  -GI: GI Bleed - s/p EGD/C-scope, both unrevealing ->unclear source of initial GI bleed, but H&H remains stable; c/w Aspirin 81 daily; c/w PPI  -DASH/Puree diet; Encourage PO/Free H2O intake  -DVT PPx  -OOB to chair / PT   -Dispo: SD to Cardiac Tele  -DNR/DNI    Armando Guerrero MD  CCU Attending .     I was physically present for the key portions of the evaluation and management (E/M) service provided.  I agree with the above history, physical, and plan which I have reviewed and edited where appropriate.
87M w/ pmh of CAD s/p CABG, HTN, HLD, DM, advanced dementia p/w NSTEMI c/w cardiogenic shock and GI bleed, admitted to CCU for further mgmt    -NSTEMI: ACS vs Type II MI in the setting of GI Bleed;  c/w conservative mgmt of CAD; c/w ASA and Lipitor  -sCHF - EF ~ 25% w/ global hypokinesis; approaching euvolemia on exam -has improved w/ diuresis; c/w Lasix 10 PO daily; Pt. continues to have increased ectopy after using beta blocker - Will f/u EP's recs but it seems unlikely that pt. will be discharged on BB  -GI: GI Bleed - s/p EGD/C-scope, both unrevealing ->unclear source of initial GI bleed, but H&H remains stable; c/w Aspirin 81 daily; c/w PPI  -DASH/Puree diet; Encourage PO/Free H2O intake  -DVT PPx  -OOB to chair / PT   -Dispo: Cardiac Tele  -DNR/DNI    Armando Guerrero MD  CCU Attending
87M w/ pmh of CAD s/p CABG, HTN, HLD, DM, advanced dementia p/w NSTEMI c/w cardiogenic shock and GI bleed, admitted to CCU for further mgmt    -NSTEMI: ACS vs Type II MI in the setting of GI Bleed; Significant GI bleed and unclear source despite EGD/C-scope -> Plan is to manage CAD conservatively; c/w ASA and Lipitor  -sCHF - EF ~ 25% w/ global hypokinesis; approaching euvolemia on exam -has improved w/ diuresis; c/w Lasix 10 PO daily; Will likely re-attempt low-dose BB prior to discharge  -GI: GI Bleed - s/p EGD/C-scope, both unrevealing ->unclear source of initial GI bleed, but H&H remains stable; c/w Aspirin 81 daily; c/w PPI  -DASH/Puree diet; Encourage PO/Free H2O intake  -DVT PPx  -OOB to chair / PT   -Dispo: Cardiac Tele  -DNR/DNI    Armando Guerrero MD  CCU Attending

## 2020-02-26 NOTE — DISCHARGE NOTE PROVIDER - CARE PROVIDER_API CALL
Isael Lux)  Cardiology  Vascular  [address] [place] [address]  Phone: [phone]  Fax: [phone]  Follow Up Time: 2 weeks Isael Lux)  Cardiology  Vascular  [address] [place] [address]  Phone: [phone]  Fax: [phone]  Scheduled Appointment: 03/09/2020 01:15 PM

## 2020-02-26 NOTE — PROGRESS NOTE ADULT - SUBJECTIVE AND OBJECTIVE BOX
CARDIOLOGY NP PROGRESS NOTE    Subjective:  Received patient awake in bed in NAD. Denies discomfort as he is alert to himself and able to follow simple commands.     Overnight Events:  Coreg initiated and Free water administered for     TELEMETRY:  SR with frequent PVCs (HR 70s-100s)        VITAL SIGNS:  T(C): 36.4 (02-26-20 @ 05:00), Max: 36.6 (02-25-20 @ 22:00)  HR: 77 (02-26-20 @ 09:21) (73 - 89)  BP: 151/77 (02-26-20 @ 09:21) (108/61 - 151/77)  RR: 17 (02-26-20 @ 09:21) (16 - 21)  SpO2: 98% (02-26-20 @ 09:21) (96% - 99%)  Wt(kg): --    I&O's Summary    25 Feb 2020 07:01  -  26 Feb 2020 07:00  --------------------------------------------------------  IN: 650 mL / OUT: 600 mL / NET: 50 mL          PHYSICAL EXAM:    General: Alert to self, able to follow simple commands. No acute Distress  Neck: Supple, NO JVD  Cardiac: S1 S2, No M/R/G  Pulmonary: CTAB, Breathing unlabored, No Rhonchi/Rales/Wheezing  Abdomen: Soft, Non -tender, +BS x 4 quads  Extremities: No Rashes, No edema  Neuro: Alert to self, able to follow simple commands. No focal deficits          LABS:                          12.6   9.60  )-----------( 341      ( 26 Feb 2020 07:29 )             42.2                              02-26    154<H>  |  118<H>  |  36<H>  ----------------------------<  155<H>  4.1   |  21<L>  |  1.03    Ca    9.2      26 Feb 2020 07:28  Phos  3.6     02-25  Mg     2.6     02-26                                CAPILLARY BLOOD GLUCOSE      POCT Blood Glucose.: 141 mg/dL (26 Feb 2020 07:03)  POCT Blood Glucose.: 131 mg/dL (25 Feb 2020 21:18)  POCT Blood Glucose.: 146 mg/dL (25 Feb 2020 16:52)  POCT Blood Glucose.: 178 mg/dL (25 Feb 2020 12:10)            Allergies:  No Known Allergies    MEDICATIONS  (STANDING):  albuterol/ipratropium for Nebulization. 3 milliLiter(s) Nebulizer every 6 hours  aspirin  chewable 81 milliGRAM(s) Oral daily  atorvastatin 40 milliGRAM(s) Oral at bedtime  carvedilol 3.125 milliGRAM(s) Oral every 12 hours  chlorhexidine 2% Cloths 1 Application(s) Topical <User Schedule>  dextrose 5%. 250 milliLiter(s) (83.333 mL/Hr) IV Continuous <Continuous>  dextrose 5%. 1000 milliLiter(s) (50 mL/Hr) IV Continuous <Continuous>  dextrose 50% Injectable 12.5 Gram(s) IV Push once  dextrose 50% Injectable 25 Gram(s) IV Push once  dextrose 50% Injectable 25 Gram(s) IV Push once  enoxaparin Injectable 30 milliGRAM(s) SubCutaneous every 24 hours  finasteride 5 milliGRAM(s) Oral daily  furosemide    Tablet 10 milliGRAM(s) Oral daily  insulin lispro (HumaLOG) corrective regimen sliding scale   SubCutaneous Before meals and at bedtime  latanoprost 0.005% Ophthalmic Solution 1 Drop(s) Right EYE at bedtime  multivitamin/minerals 1 Tablet(s) Oral daily  pantoprazole    Tablet 40 milliGRAM(s) Oral before breakfast  polyethylene glycol 3350 17 Gram(s) Oral two times a day    MEDICATIONS  (PRN):  dextrose 40% Gel 15 Gram(s) Oral once PRN Blood Glucose LESS THAN 70 milliGRAM(s)/deciliter  glucagon  Injectable 1 milliGRAM(s) IntraMuscular once PRN Glucose LESS THAN 70 milligrams/deciliter        DIAGNOSTIC TESTS:

## 2020-02-27 LAB
ANION GAP SERPL CALC-SCNC: 14 MMOL/L — SIGNIFICANT CHANGE UP (ref 5–17)
ANION GAP SERPL CALC-SCNC: 14 MMOL/L — SIGNIFICANT CHANGE UP (ref 5–17)
BUN SERPL-MCNC: 31 MG/DL — HIGH (ref 7–23)
BUN SERPL-MCNC: 37 MG/DL — HIGH (ref 7–23)
CALCIUM SERPL-MCNC: 9 MG/DL — SIGNIFICANT CHANGE UP (ref 8.4–10.5)
CALCIUM SERPL-MCNC: 9.4 MG/DL — SIGNIFICANT CHANGE UP (ref 8.4–10.5)
CHLORIDE SERPL-SCNC: 116 MMOL/L — HIGH (ref 96–108)
CHLORIDE SERPL-SCNC: 117 MMOL/L — HIGH (ref 96–108)
CO2 SERPL-SCNC: 23 MMOL/L — SIGNIFICANT CHANGE UP (ref 22–31)
CO2 SERPL-SCNC: 26 MMOL/L — SIGNIFICANT CHANGE UP (ref 22–31)
CREAT SERPL-MCNC: 1.01 MG/DL — SIGNIFICANT CHANGE UP (ref 0.5–1.3)
CREAT SERPL-MCNC: 1.03 MG/DL — SIGNIFICANT CHANGE UP (ref 0.5–1.3)
GLUCOSE BLDC GLUCOMTR-MCNC: 152 MG/DL — HIGH (ref 70–99)
GLUCOSE BLDC GLUCOMTR-MCNC: 192 MG/DL — HIGH (ref 70–99)
GLUCOSE BLDC GLUCOMTR-MCNC: 219 MG/DL — HIGH (ref 70–99)
GLUCOSE BLDC GLUCOMTR-MCNC: 287 MG/DL — HIGH (ref 70–99)
GLUCOSE SERPL-MCNC: 195 MG/DL — HIGH (ref 70–99)
GLUCOSE SERPL-MCNC: 211 MG/DL — HIGH (ref 70–99)
HCT VFR BLD CALC: 42.7 % — SIGNIFICANT CHANGE UP (ref 39–50)
HGB BLD-MCNC: 12.6 G/DL — LOW (ref 13–17)
MAGNESIUM SERPL-MCNC: 2.5 MG/DL — SIGNIFICANT CHANGE UP (ref 1.6–2.6)
MCHC RBC-ENTMCNC: 28.4 PG — SIGNIFICANT CHANGE UP (ref 27–34)
MCHC RBC-ENTMCNC: 29.5 GM/DL — LOW (ref 32–36)
MCV RBC AUTO: 96.2 FL — SIGNIFICANT CHANGE UP (ref 80–100)
NRBC # BLD: 0 /100 WBCS — SIGNIFICANT CHANGE UP (ref 0–0)
OSMOLALITY SERPL: 335 MOSMOL/KG — HIGH (ref 280–301)
PLATELET # BLD AUTO: 294 K/UL — SIGNIFICANT CHANGE UP (ref 150–400)
POTASSIUM SERPL-MCNC: 3.5 MMOL/L — SIGNIFICANT CHANGE UP (ref 3.5–5.3)
POTASSIUM SERPL-MCNC: 3.6 MMOL/L — SIGNIFICANT CHANGE UP (ref 3.5–5.3)
POTASSIUM SERPL-SCNC: 3.5 MMOL/L — SIGNIFICANT CHANGE UP (ref 3.5–5.3)
POTASSIUM SERPL-SCNC: 3.6 MMOL/L — SIGNIFICANT CHANGE UP (ref 3.5–5.3)
RBC # BLD: 4.44 M/UL — SIGNIFICANT CHANGE UP (ref 4.2–5.8)
RBC # FLD: 14.7 % — HIGH (ref 10.3–14.5)
SODIUM SERPL-SCNC: 154 MMOL/L — HIGH (ref 135–145)
SODIUM SERPL-SCNC: 156 MMOL/L — HIGH (ref 135–145)
WBC # BLD: 10.48 K/UL — SIGNIFICANT CHANGE UP (ref 3.8–10.5)
WBC # FLD AUTO: 10.48 K/UL — SIGNIFICANT CHANGE UP (ref 3.8–10.5)

## 2020-02-27 PROCEDURE — 99232 SBSQ HOSP IP/OBS MODERATE 35: CPT

## 2020-02-27 RX ORDER — SODIUM CHLORIDE 9 MG/ML
500 INJECTION, SOLUTION INTRAVENOUS
Refills: 0 | Status: DISCONTINUED | OUTPATIENT
Start: 2020-02-27 | End: 2020-02-27

## 2020-02-27 RX ORDER — HYDROCHLOROTHIAZIDE 25 MG
25 TABLET ORAL ONCE
Refills: 0 | Status: COMPLETED | OUTPATIENT
Start: 2020-02-27 | End: 2020-02-27

## 2020-02-27 RX ORDER — SODIUM CHLORIDE 9 MG/ML
500 INJECTION, SOLUTION INTRAVENOUS
Refills: 0 | Status: DISCONTINUED | OUTPATIENT
Start: 2020-02-27 | End: 2020-02-28

## 2020-02-27 RX ADMIN — ATORVASTATIN CALCIUM 40 MILLIGRAM(S): 80 TABLET, FILM COATED ORAL at 21:33

## 2020-02-27 RX ADMIN — Medication 10 MILLIGRAM(S): at 06:27

## 2020-02-27 RX ADMIN — Medication 2: at 21:34

## 2020-02-27 RX ADMIN — Medication 4: at 07:16

## 2020-02-27 RX ADMIN — SODIUM CHLORIDE 80 MILLILITER(S): 9 INJECTION, SOLUTION INTRAVENOUS at 19:59

## 2020-02-27 RX ADMIN — PANTOPRAZOLE SODIUM 40 MILLIGRAM(S): 20 TABLET, DELAYED RELEASE ORAL at 06:27

## 2020-02-27 RX ADMIN — SODIUM CHLORIDE 100 MILLILITER(S): 9 INJECTION, SOLUTION INTRAVENOUS at 11:00

## 2020-02-27 RX ADMIN — Medication 3 MILLILITER(S): at 00:47

## 2020-02-27 RX ADMIN — Medication 81 MILLIGRAM(S): at 09:48

## 2020-02-27 RX ADMIN — Medication 2: at 13:04

## 2020-02-27 RX ADMIN — Medication 3 MILLILITER(S): at 06:26

## 2020-02-27 RX ADMIN — Medication 3 MILLILITER(S): at 13:05

## 2020-02-27 RX ADMIN — Medication 6: at 15:41

## 2020-02-27 RX ADMIN — ENOXAPARIN SODIUM 30 MILLIGRAM(S): 100 INJECTION SUBCUTANEOUS at 09:48

## 2020-02-27 RX ADMIN — Medication 1 TABLET(S): at 09:48

## 2020-02-27 RX ADMIN — FINASTERIDE 5 MILLIGRAM(S): 5 TABLET, FILM COATED ORAL at 09:48

## 2020-02-27 RX ADMIN — Medication 25 MILLIGRAM(S): at 19:54

## 2020-02-27 RX ADMIN — Medication 3 MILLILITER(S): at 18:20

## 2020-02-27 RX ADMIN — LATANOPROST 1 DROP(S): 0.05 SOLUTION/ DROPS OPHTHALMIC; TOPICAL at 21:39

## 2020-02-27 NOTE — PROGRESS NOTE ADULT - SUBJECTIVE AND OBJECTIVE BOX
CARDIOLOGY NP PROGRESS NOTE    Subjective: Pt seen and examined at bedside. No specific complaints.  Remainder ROS otherwise negative.    Overnight Events: Na 154 yesterday AM, received D5w 50cc/hr x 5hr. Repeat Na 158, then received additional D5w 50cc x 10hrs. This AM, Na 154. Coreg d/c'ed yesterday in light of frequent PVCs    TELEMETRY: SR 60s, frequent PVCs, NSVT x 4beats    EKG: NSR 60s, RBBB, Qwaves in leads III & aVF, QTc 540ms      VITAL SIGNS:  T(C): 36.3 (02-27-20 @ 09:25), Max: 36.9 (02-26-20 @ 21:43)  HR: 66 (02-27-20 @ 09:10) (62 - 77)  BP: 145/65 (02-27-20 @ 09:10) (128/61 - 147/62)  RR: 22 (02-27-20 @ 09:10) (16 - 22)  SpO2: 98% (02-27-20 @ 09:10) (95% - 100%)  Wt(kg): --    I&O's Summary    26 Feb 2020 07:01  -  27 Feb 2020 07:00  --------------------------------------------------------  IN: 1263.1 mL / OUT: 750 mL / NET: 513.1 mL          PHYSICAL EXAM:    General: Alert to self, able to follow simple commands. No acute Distress  Neck: Supple, NO JVD  Cardiac: S1 S2, No M/R/G  Pulmonary: CTAB, Breathing unlabored on RA, No Rhonchi/Rales/Wheezing  Abdomen: Soft, Non -tender, +BS x 4 quads  Extremities: No Rashes, No edema  Neuro: Alert to self, able to follow simple commands. No focal deficits          LABS:                          12.6   10.48 )-----------( 294      ( 27 Feb 2020 06:58 )             42.7                              02-27    154<H>  |  117<H>  |  37<H>  ----------------------------<  211<H>  3.6   |  23  |  1.03    Ca    9.4      27 Feb 2020 06:58  Mg     2.5     02-27                                CAPILLARY BLOOD GLUCOSE      POCT Blood Glucose.: 219 mg/dL (27 Feb 2020 06:25)  POCT Blood Glucose.: 170 mg/dL (26 Feb 2020 21:12)  POCT Blood Glucose.: 142 mg/dL (26 Feb 2020 16:40)  POCT Blood Glucose.: 196 mg/dL (26 Feb 2020 12:35)            Allergies:  No Known Allergies    MEDICATIONS  (STANDING):  albuterol/ipratropium for Nebulization. 3 milliLiter(s) Nebulizer every 6 hours  aspirin  chewable 81 milliGRAM(s) Oral daily  atorvastatin 40 milliGRAM(s) Oral at bedtime  dextrose 5%. 250 milliLiter(s) (83.333 mL/Hr) IV Continuous <Continuous>  dextrose 5%. 500 milliLiter(s) (50 mL/Hr) IV Continuous <Continuous>  dextrose 5%. 500 milliLiter(s) (100 mL/Hr) IV Continuous <Continuous>  dextrose 5%. 1000 milliLiter(s) (50 mL/Hr) IV Continuous <Continuous>  dextrose 50% Injectable 12.5 Gram(s) IV Push once  dextrose 50% Injectable 25 Gram(s) IV Push once  dextrose 50% Injectable 25 Gram(s) IV Push once  enoxaparin Injectable 30 milliGRAM(s) SubCutaneous every 24 hours  finasteride 5 milliGRAM(s) Oral daily  furosemide    Tablet 10 milliGRAM(s) Oral daily  insulin lispro (HumaLOG) corrective regimen sliding scale   SubCutaneous Before meals and at bedtime  latanoprost 0.005% Ophthalmic Solution 1 Drop(s) Right EYE at bedtime  multivitamin/minerals 1 Tablet(s) Oral daily  pantoprazole    Tablet 40 milliGRAM(s) Oral before breakfast  polyethylene glycol 3350 17 Gram(s) Oral two times a day    MEDICATIONS  (PRN):  dextrose 40% Gel 15 Gram(s) Oral once PRN Blood Glucose LESS THAN 70 milliGRAM(s)/deciliter  glucagon  Injectable 1 milliGRAM(s) IntraMuscular once PRN Glucose LESS THAN 70 milligrams/deciliter        DIAGNOSTIC TESTS:

## 2020-02-27 NOTE — CHART NOTE - NSCHARTNOTEFT_GEN_A_CORE
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  DM2 (diabetes mellitus, type 2)  CAD, multiple vessel  H/O hernia repair  S/P CABG (coronary artery bypass graft)      Current Nutrition Order: Consistent Carbohydrate DASH puree ensure enlive x2  PO Intake: Good (%) [   ]  Fair (50-75%) [   ] Poor (<25%) [  X  ]  GI Issues: x1 +BM 2/25 & 2/26   Pain: per flow sheets - non-verbal indicators of pain/discomfort absent  Skin: no edema/pressure ulcers noted at this time      Labs:   02-27    154<H>  |  117<H>  |  37<H>  ----------------------------<  211<H>  3.6   |  23  |  1.03    Ca    9.4      27 Feb 2020 06:58  Mg     2.5     02-27      CAPILLARY BLOOD GLUCOSE      POCT Blood Glucose.: 219 mg/dL (27 Feb 2020 06:25)  POCT Blood Glucose.: 170 mg/dL (26 Feb 2020 21:12)  POCT Blood Glucose.: 142 mg/dL (26 Feb 2020 16:40)  POCT Blood Glucose.: 196 mg/dL (26 Feb 2020 12:35)      Medications:  MEDICATIONS  (STANDING):  albuterol/ipratropium for Nebulization. 3 milliLiter(s) Nebulizer every 6 hours  aspirin  chewable 81 milliGRAM(s) Oral daily  atorvastatin 40 milliGRAM(s) Oral at bedtime  dextrose 5%. 250 milliLiter(s) (83.333 mL/Hr) IV Continuous <Continuous>  dextrose 5%. 500 milliLiter(s) (50 mL/Hr) IV Continuous <Continuous>  dextrose 5%. 1000 milliLiter(s) (50 mL/Hr) IV Continuous <Continuous>  dextrose 50% Injectable 12.5 Gram(s) IV Push once  dextrose 50% Injectable 25 Gram(s) IV Push once  dextrose 50% Injectable 25 Gram(s) IV Push once  enoxaparin Injectable 30 milliGRAM(s) SubCutaneous every 24 hours  finasteride 5 milliGRAM(s) Oral daily  furosemide    Tablet 10 milliGRAM(s) Oral daily  insulin lispro (HumaLOG) corrective regimen sliding scale   SubCutaneous Before meals and at bedtime  latanoprost 0.005% Ophthalmic Solution 1 Drop(s) Right EYE at bedtime  multivitamin/minerals 1 Tablet(s) Oral daily  pantoprazole    Tablet 40 milliGRAM(s) Oral before breakfast  polyethylene glycol 3350 17 Gram(s) Oral two times a day    MEDICATIONS  (PRN):  dextrose 40% Gel 15 Gram(s) Oral once PRN Blood Glucose LESS THAN 70 milliGRAM(s)/deciliter  glucagon  Injectable 1 milliGRAM(s) IntraMuscular once PRN Glucose LESS THAN 70 milligrams/deciliter        5'0''  pounds +/-10%   (2/10) 150 pounds  (2/13) 153 pounds  (2/17) 145.9 pounds   (2/18) 137.1 pounds   (2/20) 141.7 pounds   (2/24) 133.8 pounds  (2/25) 127.6 pounds   (2/27) 129.8 pounds   Wt has trended down since admission; CHF pt who has been on different floors with diuretic use noted; +Edema when admitted (1+ left ankle;  right ankle), with none noted at this time    Nutrition Focused Physical Exam: Completed [  X -limited given pt ability to participate at this time ]  Not Pertinent [   ]  Muscle Wasting- Temporal [ Moderate ]  Clavicle/Pectoral [   ]  Shoulder/Deltoid [   ]  Scapula [   ]  Interosseous [   ]  Quadriceps [   ]  Gastrocnemius [   ]  Fat Wasting- Orbital [   ]  Buccal [   ]  Triceps [   ]  Rib [   ]      IBW used to calculate energy needs d/t varying EMR wts  Adjusted for age, CHF, fluids per team  1200-1440kcal/day 25-30kcal/kg  58gm/day 1.2gm/kg     Subjective:   88yo M, HX of CAD, hx CABG approx 10 years ago, DM2, hernia, dementia (AAOx2 at baseline). Pt with AMS PTA, presents for NSTEMI vs Type II MI, cardiogenic shock vs less likely hypovolemic shock from GIB, Acute CHF EF 25% volume overload - need for repeat ECHO noted. Pt with GIB (PTA witnessed episode of melena, stool guiac positive at [facility]), however EGD did not find source of bleeding, +duodenal mass; c-scope found no sources of bleeding with some diverticula; no s/s of bleed at this time. Pt s/p intubation in endo suite. [facility] 2/15 pt to be Full Code. Per progress note 2/18 - became stuporous with minimal responsiveness, noted dementia, baseline AAOx1-2 + waxes and/wanes. Course further complicated by episodes of polymorphic ventricular tachycardia - EP consulted, not recs for ICD at this time. Pt is pending d/c VALERIE.  Pt is now on 5 LA, has been being followed by SLP Since time in CCU. While in CCU noted various recs by SLP based on pt level of awake and alertness. Most recent SLP f/u 2/24 noted - recs for puree/thin liquids, ASP Precautions. Pt visited today, RN present. Current diet order for  puree/consistent CHO no snack diet + Ensure enlive x2 per day as ONS. Breakfast maurizio nearly untouched today, RN reports pt ate better 2/26 however overall intake less then 50% of meals, denies issues swallowing when pt does eat.  Please see below for nutrition recommendations. Pending order placed. Recs made with team.      Previous Nutrition Diagnosis: Inadequate Energy Intake Etiology rt intake<EER Signs/Symptoms aeb NPO.   Active [   ]  Resolved [x]  Previous Nutrition Diagnosis: Inadequate PO intake RT decreased appetite, AMS, AEB 0% PO   Active [x]  Resolved [  ]  New PES: Difficulty swallowing RT AMS AEB SLP Recs for puree diet vs NPO   Active [x]  Resolved [  ]  Goal: Pt will meet at least 75% of nutrient needs via safe feasible route consistent with [facility]     Recommendations:  1. Should PO diet remain - suggest use of regular diet, Glucerna x3 per day as oral nutrition supplements (220kcal/10gm prot per 1 can); Fluids per team; PO consistency per SLP.  2. Monitor %PO intake - suggest appetite stimulant. Monitor diet tolerance. Recommend to Maintain ASP precautions.  >> Per SLP feeding when pt awake, alert, attentive, energized. In the event s/s of issues swallowing noted / pt not alert enough for meals, recommend NPO + SLP f/u.  >> Should pt PO intake continue to be affected by waxing/waning ? ability to meet EER via PO alone; Should TF be indicated either d/t poor PO vs issues swallowing, Please reconsult for Recs PRN.   3. Monitor Skin, Labs, wts daily, GI distress, GOC.  4. MVI daily; EMR order noted.  5. RD to remain available for additional nutrition interventions as needed.     Education: NA.     Risk Level: High [ X ] Moderate [   ] Low [   ].

## 2020-02-27 NOTE — PROGRESS NOTE ADULT - PROBLEM SELECTOR PLAN 2
Presented with melena on admission.  - CT abd: 9 mm polypoid intraluminal mass in second portion of duodenum near ampulla, no evidence of metastatic disease.  - s/p EGD/C-scope without confirmed source of bleed. Incidental duodenal mass visualized; pathology of mass remains unknown, w/o specific microscopic abnormalities  - s/p 2U PRBC, hgb/hct remains stable without s/sx bleeding; continue to monitor  - c/w Protonix 40mg qd (decreased from BID as QTc prolonging agent; will consider reinitiating BID per GI/EP recs)
Presented with melena on admission.  - CT abd: 9 mm polypoid intraluminal mass in second portion of duodenum near ampulla, no evidence of metastatic disease.  - s/p EGD/C-scope without confirmed source of bleed. Incidental duodenal mass visualized; pathology of mass remains unknown, w/o specific microscopic abnormalities  - s/p 2U PRBC, hgb/hct remains stable without s/sx bleeding; continue to monitor  - c/w Protonix 40mg qd (decreased from BID as QTc prolonging agent; will consider reinitiating BID per GI/EP recs)
Presented with melena on admission. RESOLVED.  - CT abd: 9 mm polypoid intraluminal mass in second portion of duodenum near ampulla, no evidence of metastatic disease.  - s/p EGD/C-scope without confirmed source of bleed. Incidental duodenal mass visualized; pathology of mass remains unknown, w/o specific microscopic abnormalities  - s/p 2U PRBC, hgb/hct remains stable without s/sx bleeding; continue to monitor  - c/w Protonix 40mg qd (decreased from BID as QTc prolonging agent; will consider reinitiating BID per GI/EP recs)
Presented with melena on admission  - CT ab: 9 mm polypoid intraluminal mass in second portion of duodenum near ampulla, no evidence of metastatic disease.  - s/p EGD/C-scope without confirmed source of bleed. Incidental duodenal mass visualized; pathology of mass remains unknown.   - s/p 2U PRBC, hgb/hct remains stable without s/sx bleeding; continue to monitor  - c/w Protonix 40mg qd (decreased from BID as QTc prolonging agent; will consider reinitiating BID per GI/EP recs)

## 2020-02-27 NOTE — PROGRESS NOTE ADULT - PROBLEM SELECTOR PLAN 1
Known hx CAD s/p CABG (2003) with 2/3 grafts occluded as of 2009. Trop peak 2.72 w/ EKG - RBBB, ST depressions in anterolateral leads.   - Given significant GI bleed and unclear source despite EGD/C-scope, plan for conservative management of NSTEMI  - c/w ASA 81mg qd  - c/w Lipitor 40mg qhs  - Multiple attempts to initiate BB during hospitalization, but pt failed trial as he is developing worsening PVCs w/ BB initiation (Lopressor, Amio, Coreg)
Known hx CAD s/p CABG (2003) with 2/3 grafts occluded as of 2009. Trop peak 2.72 w/ EKG - right bundle, ST depressions in anterolateral leads.   - Given significant GI bleed and unclear source despite EGD/C-scope, plan for conservative management of NSTEMI  - c/w ASA 81mg qd  - c/w Lipitor 40mg qhs  - Consider re-trial of beta blocker in setting of recent VTach episodes per EP recs  - continue to monitor
Known hx CAD s/p CABG (2003) with 2/3 grafts occluded as of 2009. Trop peak 2.72 w/ EKG - right bundle, ST depressions in anterolateral leads.   - Given significant GI bleed and unclear source despite EGD/C-scope, plan for conservative management of NSTEMI  - c/w ASA 81mg qd  - c/w Lipitor 40mg qhs  - Coreg initiated 2/25 and patient with frequent PVCs noted on tele, consider discontinuing as patient has failed beta blocker trials this admission  - continue to monitor
Known hx CAD s/p CABG (2003) with 2/3 grafts occluded as of 2009. Trop peak 2.72 w/ EKG - right bundle, ST depressions in anterolateral leads.   - Given significant GI bleed and unclear source despite EGD/C-scope, plan for conservative management of NSTEMI  - c/w ASA 81mg qd  - c/w Lipitor 40mg qhs  - hold beta blocker in setting of recent VTach episodes  - continue to monitor

## 2020-02-27 NOTE — PROGRESS NOTE ADULT - PROBLEM SELECTOR PLAN 6
WBC 12.25, afebrile, non-toxic appearing. RVP negative. Legionella negative. RESOLVED.  - Blood cultures no growth x 5 days 2/15/20  - U/A negative.   - If becomes febrile, pan culture and complete fever work up  - continue to monitor
WBC 12.25, afebrile, non-toxic appearing. RVP negative. Legionella negative.   - Blood cultures no growth x 5 days 2/15/20  - U/A negative.   - If becomes febrile, pan culture and complete fever work up  - continue to monitor

## 2020-02-27 NOTE — PROGRESS NOTE ADULT - PROBLEM SELECTOR PLAN 5
today, hypernatremic since 2/18 and has been asymptomatic.   - s/p free water D5W 250cc 2/25 without improvement of NA. Will receive additional free water D5W 250 cc today with follow up BMP   - continue to monitor in setting of continued diuresis  - encourage PO fluid intake
 today, hypernatremic since 2/18 and has been asymptomatic.   - s/p free water D5W 500cc 2/26 with mild improvement of Na. Will receive additional free water D5W 500 cc today with follow up BMP   - continue to monitor in setting of continued diuresis  - encourage PO fluid intake
, hypernatremic since 2/18 and has been asymptomatic.   - continue to monitor in setting of continued diuresis  - encourage PO fluid intake  - avoiding IVF in setting of fluid overload as above, start slow administration of 250cc free water D5W today
, hypernatremic since 2/18 and has been asymptomatic.   - continue to monitor in setting of continued diuresis  - encourage PO fluid intake  - avoiding IVF in setting of fluid overload as above, slow administration of 250cc D5W if Na increasing

## 2020-02-27 NOTE — PROGRESS NOTE ADULT - PROBLEM SELECTOR PROBLEM 3
Ventricular tachycardia, polymorphic

## 2020-02-27 NOTE — PROGRESS NOTE ADULT - PROBLEM SELECTOR PLAN 3
Patient with multiple episodes of self-resolving polymorphic VTach s/p beta blocker initiation (most recent episode 2/23)  - Consider re-trial of beta blocker in setting of recent VTach episodes per EP recs  - avoid QTc prolonging medications (memantine and Protonix changed from BID to qd)  - EP consulted, appreciate recs.   - continue to monitor
Patient with multiple episodes of self-resolving polymorphic VTach s/p beta blocker initiation (most recent episode 2/23)  - Coreg initiated 2/25 and patient with frequent PVCs noted on tele, consider discontinuing as patient has failed beta blocker trials this admission  - avoid QTc prolonging medications (memantine and Protonix changed from BID to qd)  - EP consulted, appreciate recs.   - continue to monitor
Patient with multiple episodes of self-resolving polymorphic VTach s/p beta blocker initiation (most recent episode 2/23)  - Coreg initiated 2/25 and patient with frequent PVCs noted on tele, consider discontinuing as patient has failed beta blocker trials this admission  - avoid QTc prolonging medications (memantine and Protonix changed from BID to qd)  - EP consulted, appreciate recs.   - continue to monitor
Patient with multiple episodes of self-resolving polymorphic VTach s/p beta blocker initiation (most recent episode 2/23)  - continue to HOLD beta blockers  - avoid QTc prolonging medications (memantine and Protonix changed from BID to qd)  - EP consulted, appreciate recs.   - continue to monitor

## 2020-02-27 NOTE — PROGRESS NOTE ADULT - ASSESSMENT
88 y/o M w/ PMH CAD s/p CABG 2009, HTN, HLD, DM, advanced dementia, admitted to CCU w/ NSTEMI medically managed c/b cardiogenic shock s/p Dobutamine gtt, and GI bleed. Now off inotropic support without further melanotic episodes, stepped down to tele 5Lachman for further management. Persistent hypernatremia s/p free water hydration. Awaiting VALERIE.

## 2020-02-27 NOTE — PROGRESS NOTE ADULT - PROBLEM SELECTOR PLAN 4
Patient with systolic heart failure as EF 25% with global hypokinesis. Lungs CTA without LE edema.   - Appears euvolemia at this time   - c/w PO Lasix 10mg qd   - D/c'ed Coreg as patient with frequent PVCs noted on tele after initiation, failed beta blocker trials this admission  - continue to HOLD lisinopril in setting of tenuous BPs   - strict I/O's, daily weights

## 2020-02-27 NOTE — PROGRESS NOTE ADULT - PROBLEM SELECTOR PLAN 8
HX DM. HgbA1C 6.7. Home meds: Metformin 500mg BID  - c/w mISS  - continue to monitor

## 2020-02-27 NOTE — PROGRESS NOTE ADULT - REASON FOR ADMISSION
Cardiogenic shock, GIB, NSTEMI

## 2020-02-27 NOTE — PROGRESS NOTE ADULT - PROBLEM SELECTOR PLAN 9
F: No IVF, encourage PO intake  N: Pureed diet, DASH/TLC, consistent carb.   E: Replete lytes PRN K<4, Mg<2  P: DVT PPX: Lovenox SQ  C: DNR  Dispo:   tele 5Lach
F: free water D5W 250cc, encourage PO intake  N: Pureed diet, DASH/TLC, consistent carb.   E: Replete lytes PRN K<4, Mg<2  P: DVT PPX: Lovenox SQ  C: DNR  Dispo:   tele 5Lach
F: free water D5W 500cc, encourage PO intake  N: Pureed diet, DASH/TLC, consistent carb.   E: Replete lytes PRN K<4, Mg<2  P: DVT PPX: Lovenox SQ  C: DNR  Dispo:   tele 5Lach
F: No IVF, encourage PO intake  N: Pureed diet, DASH/TLC, consistent carb.   E: Replete lytes PRN K<4, Mg<2  P: DVT PPX: Lovenox SQ  C: DNR  Dispo: Stepped down to tele 5Lach

## 2020-02-27 NOTE — PROGRESS NOTE ADULT - PROBLEM SELECTOR PLAN 7
History of dementia with baseline AAOx1-2; patient appears to be mentating near baseline; waxes and wanes throughout the day. Home meds: memantine and donepezil  - continue to hold memantine given concern for QTc prolongation  - continue to hold donepezil in setting of lack of clinical benefit  - continue to monitor

## 2020-02-28 VITALS
OXYGEN SATURATION: 98 % | RESPIRATION RATE: 18 BRPM | DIASTOLIC BLOOD PRESSURE: 59 MMHG | SYSTOLIC BLOOD PRESSURE: 117 MMHG

## 2020-02-28 LAB
ANION GAP SERPL CALC-SCNC: 15 MMOL/L — SIGNIFICANT CHANGE UP (ref 5–17)
BUN SERPL-MCNC: 25 MG/DL — HIGH (ref 7–23)
CALCIUM SERPL-MCNC: 8.7 MG/DL — SIGNIFICANT CHANGE UP (ref 8.4–10.5)
CHLORIDE SERPL-SCNC: 112 MMOL/L — HIGH (ref 96–108)
CO2 SERPL-SCNC: 23 MMOL/L — SIGNIFICANT CHANGE UP (ref 22–31)
CREAT SERPL-MCNC: 0.97 MG/DL — SIGNIFICANT CHANGE UP (ref 0.5–1.3)
GLUCOSE BLDC GLUCOMTR-MCNC: 142 MG/DL — HIGH (ref 70–99)
GLUCOSE BLDC GLUCOMTR-MCNC: 248 MG/DL — HIGH (ref 70–99)
GLUCOSE SERPL-MCNC: 136 MG/DL — HIGH (ref 70–99)
HCT VFR BLD CALC: 39.7 % — SIGNIFICANT CHANGE UP (ref 39–50)
HGB BLD-MCNC: 11.9 G/DL — LOW (ref 13–17)
MAGNESIUM SERPL-MCNC: 2.2 MG/DL — SIGNIFICANT CHANGE UP (ref 1.6–2.6)
MCHC RBC-ENTMCNC: 28.3 PG — SIGNIFICANT CHANGE UP (ref 27–34)
MCHC RBC-ENTMCNC: 30 GM/DL — LOW (ref 32–36)
MCV RBC AUTO: 94.5 FL — SIGNIFICANT CHANGE UP (ref 80–100)
NRBC # BLD: 0 /100 WBCS — SIGNIFICANT CHANGE UP (ref 0–0)
PLATELET # BLD AUTO: 283 K/UL — SIGNIFICANT CHANGE UP (ref 150–400)
POTASSIUM SERPL-MCNC: 3.4 MMOL/L — LOW (ref 3.5–5.3)
POTASSIUM SERPL-SCNC: 3.4 MMOL/L — LOW (ref 3.5–5.3)
RBC # BLD: 4.2 M/UL — SIGNIFICANT CHANGE UP (ref 4.2–5.8)
RBC # FLD: 14.8 % — HIGH (ref 10.3–14.5)
SODIUM SERPL-SCNC: 150 MMOL/L — HIGH (ref 135–145)
WBC # BLD: 11.26 K/UL — HIGH (ref 3.8–10.5)
WBC # FLD AUTO: 11.26 K/UL — HIGH (ref 3.8–10.5)

## 2020-02-28 PROCEDURE — 84100 ASSAY OF PHOSPHORUS: CPT

## 2020-02-28 PROCEDURE — 87798 DETECT AGENT NOS DNA AMP: CPT

## 2020-02-28 PROCEDURE — 97110 THERAPEUTIC EXERCISES: CPT

## 2020-02-28 PROCEDURE — 85014 HEMATOCRIT: CPT

## 2020-02-28 PROCEDURE — 80048 BASIC METABOLIC PNL TOTAL CA: CPT

## 2020-02-28 PROCEDURE — 85730 THROMBOPLASTIN TIME PARTIAL: CPT

## 2020-02-28 PROCEDURE — 71045 X-RAY EXAM CHEST 1 VIEW: CPT

## 2020-02-28 PROCEDURE — 94002 VENT MGMT INPAT INIT DAY: CPT

## 2020-02-28 PROCEDURE — 86901 BLOOD TYPING SEROLOGIC RH(D): CPT

## 2020-02-28 PROCEDURE — 82330 ASSAY OF CALCIUM: CPT

## 2020-02-28 PROCEDURE — 84295 ASSAY OF SERUM SODIUM: CPT

## 2020-02-28 PROCEDURE — 83605 ASSAY OF LACTIC ACID: CPT

## 2020-02-28 PROCEDURE — 94660 CPAP INITIATION&MGMT: CPT

## 2020-02-28 PROCEDURE — 83880 ASSAY OF NATRIURETIC PEPTIDE: CPT

## 2020-02-28 PROCEDURE — 82550 ASSAY OF CK (CPK): CPT

## 2020-02-28 PROCEDURE — 87641 MR-STAPH DNA AMP PROBE: CPT

## 2020-02-28 PROCEDURE — 85027 COMPLETE CBC AUTOMATED: CPT

## 2020-02-28 PROCEDURE — 84132 ASSAY OF SERUM POTASSIUM: CPT

## 2020-02-28 PROCEDURE — 82553 CREATINE MB FRACTION: CPT

## 2020-02-28 PROCEDURE — 94640 AIRWAY INHALATION TREATMENT: CPT

## 2020-02-28 PROCEDURE — P9016: CPT

## 2020-02-28 PROCEDURE — 85025 COMPLETE CBC W/AUTO DIFF WBC: CPT

## 2020-02-28 PROCEDURE — 80053 COMPREHEN METABOLIC PANEL: CPT

## 2020-02-28 PROCEDURE — 85018 HEMOGLOBIN: CPT

## 2020-02-28 PROCEDURE — 88305 TISSUE EXAM BY PATHOLOGIST: CPT

## 2020-02-28 PROCEDURE — 87040 BLOOD CULTURE FOR BACTERIA: CPT

## 2020-02-28 PROCEDURE — 93005 ELECTROCARDIOGRAM TRACING: CPT

## 2020-02-28 PROCEDURE — 87486 CHLMYD PNEUM DNA AMP PROBE: CPT

## 2020-02-28 PROCEDURE — 84443 ASSAY THYROID STIM HORMONE: CPT

## 2020-02-28 PROCEDURE — 82803 BLOOD GASES ANY COMBINATION: CPT

## 2020-02-28 PROCEDURE — 84484 ASSAY OF TROPONIN QUANT: CPT

## 2020-02-28 PROCEDURE — 86923 COMPATIBILITY TEST ELECTRIC: CPT

## 2020-02-28 PROCEDURE — 83930 ASSAY OF BLOOD OSMOLALITY: CPT

## 2020-02-28 PROCEDURE — 87633 RESP VIRUS 12-25 TARGETS: CPT

## 2020-02-28 PROCEDURE — 82962 GLUCOSE BLOOD TEST: CPT

## 2020-02-28 PROCEDURE — 99239 HOSP IP/OBS DSCHRG MGMT >30: CPT

## 2020-02-28 PROCEDURE — 87581 M.PNEUMON DNA AMP PROBE: CPT

## 2020-02-28 PROCEDURE — 83036 HEMOGLOBIN GLYCOSYLATED A1C: CPT

## 2020-02-28 PROCEDURE — 85610 PROTHROMBIN TIME: CPT

## 2020-02-28 PROCEDURE — 86850 RBC ANTIBODY SCREEN: CPT

## 2020-02-28 PROCEDURE — 87449 NOS EACH ORGANISM AG IA: CPT

## 2020-02-28 PROCEDURE — 97162 PT EVAL MOD COMPLEX 30 MIN: CPT

## 2020-02-28 PROCEDURE — 80061 LIPID PANEL: CPT

## 2020-02-28 PROCEDURE — 36430 TRANSFUSION BLD/BLD COMPNT: CPT

## 2020-02-28 PROCEDURE — 97530 THERAPEUTIC ACTIVITIES: CPT

## 2020-02-28 PROCEDURE — 83735 ASSAY OF MAGNESIUM: CPT

## 2020-02-28 PROCEDURE — C8929: CPT

## 2020-02-28 PROCEDURE — 84145 PROCALCITONIN (PCT): CPT

## 2020-02-28 PROCEDURE — 92610 EVALUATE SWALLOWING FUNCTION: CPT

## 2020-02-28 PROCEDURE — 81001 URINALYSIS AUTO W/SCOPE: CPT

## 2020-02-28 PROCEDURE — 74177 CT ABD & PELVIS W/CONTRAST: CPT

## 2020-02-28 PROCEDURE — 92526 ORAL FUNCTION THERAPY: CPT

## 2020-02-28 PROCEDURE — 93970 EXTREMITY STUDY: CPT

## 2020-02-28 PROCEDURE — 97116 GAIT TRAINING THERAPY: CPT

## 2020-02-28 PROCEDURE — 36415 COLL VENOUS BLD VENIPUNCTURE: CPT

## 2020-02-28 RX ORDER — TAMSULOSIN HYDROCHLORIDE 0.4 MG/1
1 CAPSULE ORAL
Qty: 0 | Refills: 0 | DISCHARGE

## 2020-02-28 RX ORDER — FUROSEMIDE 40 MG
10 TABLET ORAL
Qty: 0 | Refills: 0 | DISCHARGE

## 2020-02-28 RX ORDER — FUROSEMIDE 40 MG
20 TABLET ORAL DAILY
Refills: 0 | Status: DISCONTINUED | OUTPATIENT
Start: 2020-02-28 | End: 2020-02-28

## 2020-02-28 RX ORDER — CLOPIDOGREL BISULFATE 75 MG/1
1 TABLET, FILM COATED ORAL
Qty: 0 | Refills: 0 | DISCHARGE

## 2020-02-28 RX ORDER — POTASSIUM CHLORIDE 20 MEQ
40 PACKET (EA) ORAL ONCE
Refills: 0 | Status: COMPLETED | OUTPATIENT
Start: 2020-02-28 | End: 2020-02-28

## 2020-02-28 RX ORDER — METOPROLOL TARTRATE 50 MG
1 TABLET ORAL
Qty: 0 | Refills: 0 | DISCHARGE

## 2020-02-28 RX ORDER — TOLTERODINE TARTRATE 1 MG/1
1 TABLET, FILM COATED ORAL
Qty: 0 | Refills: 0 | DISCHARGE

## 2020-02-28 RX ORDER — MEMANTINE HYDROCHLORIDE 10 MG/1
1 TABLET ORAL
Qty: 0 | Refills: 0 | DISCHARGE

## 2020-02-28 RX ORDER — POLYETHYLENE GLYCOL 3350 17 G/17G
17 POWDER, FOR SOLUTION ORAL
Qty: 0 | Refills: 0 | DISCHARGE
Start: 2020-02-28

## 2020-02-28 RX ORDER — ATORVASTATIN CALCIUM 80 MG/1
1 TABLET, FILM COATED ORAL
Qty: 0 | Refills: 0 | DISCHARGE

## 2020-02-28 RX ORDER — ATORVASTATIN CALCIUM 80 MG/1
1 TABLET, FILM COATED ORAL
Qty: 0 | Refills: 0 | DISCHARGE
Start: 2020-02-28

## 2020-02-28 RX ORDER — MULTIVIT-MIN/FERROUS GLUCONATE 9 MG/15 ML
1 LIQUID (ML) ORAL
Qty: 0 | Refills: 0 | DISCHARGE
Start: 2020-02-28

## 2020-02-28 RX ORDER — DONEPEZIL HYDROCHLORIDE 10 MG/1
1 TABLET, FILM COATED ORAL
Qty: 0 | Refills: 0 | DISCHARGE

## 2020-02-28 RX ORDER — FUROSEMIDE 40 MG
10 TABLET ORAL DAILY
Refills: 0 | Status: DISCONTINUED | OUTPATIENT
Start: 2020-02-28 | End: 2020-02-28

## 2020-02-28 RX ORDER — PANTOPRAZOLE SODIUM 20 MG/1
1 TABLET, DELAYED RELEASE ORAL
Qty: 0 | Refills: 0 | DISCHARGE
Start: 2020-02-28

## 2020-02-28 RX ADMIN — FINASTERIDE 5 MILLIGRAM(S): 5 TABLET, FILM COATED ORAL at 11:02

## 2020-02-28 RX ADMIN — Medication 40 MILLIEQUIVALENT(S): at 10:14

## 2020-02-28 RX ADMIN — Medication 3 MILLILITER(S): at 06:15

## 2020-02-28 RX ADMIN — PANTOPRAZOLE SODIUM 40 MILLIGRAM(S): 20 TABLET, DELAYED RELEASE ORAL at 06:15

## 2020-02-28 RX ADMIN — Medication 1 TABLET(S): at 11:03

## 2020-02-28 RX ADMIN — Medication 10 MILLIGRAM(S): at 11:02

## 2020-02-28 RX ADMIN — Medication 4: at 12:12

## 2020-02-28 RX ADMIN — Medication 3 MILLILITER(S): at 00:28

## 2020-02-28 RX ADMIN — ENOXAPARIN SODIUM 30 MILLIGRAM(S): 100 INJECTION SUBCUTANEOUS at 10:15

## 2020-02-28 RX ADMIN — POLYETHYLENE GLYCOL 3350 17 GRAM(S): 17 POWDER, FOR SOLUTION ORAL at 06:15

## 2020-02-28 RX ADMIN — Medication 81 MILLIGRAM(S): at 11:02

## 2020-02-28 NOTE — DISCHARGE NOTE NURSING/CASE MANAGEMENT/SOCIAL WORK - PATIENT PORTAL LINK FT
You can access the FollowMyHealth Patient Portal offered by [facility] by registering at the following website: [email or web address]. By joining [facility]’s FollowMyHealth portal, you will also be able to view your health information using other applications (apps) compatible with our system.

## 2020-02-28 NOTE — DISCHARGE NOTE NURSING/CASE MANAGEMENT/SOCIAL WORK - NSDCPEPT PROEDHF_GEN_ALL_CORE
Monitor weight daily/Activities as tolerated/Call primary care provider for follow up after discharge/Low salt diet/Report signs and symptoms to primary care provider

## 2020-02-28 NOTE — DISCHARGE NOTE NURSING/CASE MANAGEMENT/SOCIAL WORK - NSDCPETBCESMAN_GEN_ALL_CORE
If you are a smoker, it is important for your health to stop smoking. Please be aware that second hand smoke is also harmful.
19.48

## 2020-03-02 DIAGNOSIS — I47.2 VENTRICULAR TACHYCARDIA: ICD-10-CM

## 2020-03-02 DIAGNOSIS — K92.1 MELENA: ICD-10-CM

## 2020-03-02 DIAGNOSIS — R07.9 CHEST PAIN, UNSPECIFIED: ICD-10-CM

## 2020-03-02 DIAGNOSIS — Z66 DO NOT RESUSCITATE: ICD-10-CM

## 2020-03-02 DIAGNOSIS — E11.9 TYPE 2 DIABETES MELLITUS WITHOUT COMPLICATIONS: ICD-10-CM

## 2020-03-02 DIAGNOSIS — Z78.1 PHYSICAL RESTRAINT STATUS: ICD-10-CM

## 2020-03-02 DIAGNOSIS — F03.90 UNSPECIFIED DEMENTIA WITHOUT BEHAVIORAL DISTURBANCE: ICD-10-CM

## 2020-03-02 DIAGNOSIS — E87.0 HYPEROSMOLALITY AND HYPERNATREMIA: ICD-10-CM

## 2020-03-02 DIAGNOSIS — D72.829 ELEVATED WHITE BLOOD CELL COUNT, UNSPECIFIED: ICD-10-CM

## 2020-03-02 DIAGNOSIS — I25.10 ATHEROSCLEROTIC HEART DISEASE OF NATIVE CORONARY ARTERY WITHOUT ANGINA PECTORIS: ICD-10-CM

## 2020-03-02 DIAGNOSIS — I44.7 LEFT BUNDLE-BRANCH BLOCK, UNSPECIFIED: ICD-10-CM

## 2020-03-02 DIAGNOSIS — E78.5 HYPERLIPIDEMIA, UNSPECIFIED: ICD-10-CM

## 2020-03-02 DIAGNOSIS — N40.0 BENIGN PROSTATIC HYPERPLASIA WITHOUT LOWER URINARY TRACT SYMPTOMS: ICD-10-CM

## 2020-03-02 DIAGNOSIS — K20.9 ESOPHAGITIS, UNSPECIFIED: ICD-10-CM

## 2020-03-02 DIAGNOSIS — R57.0 CARDIOGENIC SHOCK: ICD-10-CM

## 2020-03-02 DIAGNOSIS — Z95.1 PRESENCE OF AORTOCORONARY BYPASS GRAFT: ICD-10-CM

## 2020-03-02 DIAGNOSIS — I11.0 HYPERTENSIVE HEART DISEASE WITH HEART FAILURE: ICD-10-CM

## 2020-03-02 DIAGNOSIS — J18.9 PNEUMONIA, UNSPECIFIED ORGANISM: ICD-10-CM

## 2020-03-02 DIAGNOSIS — I21.4 NON-ST ELEVATION (NSTEMI) MYOCARDIAL INFARCTION: ICD-10-CM

## 2020-03-02 DIAGNOSIS — I45.81 LONG QT SYNDROME: ICD-10-CM

## 2020-03-02 DIAGNOSIS — K22.70 BARRETT'S ESOPHAGUS WITHOUT DYSPLASIA: ICD-10-CM

## 2020-03-02 DIAGNOSIS — I95.9 HYPOTENSION, UNSPECIFIED: ICD-10-CM

## 2020-03-02 DIAGNOSIS — I50.23 ACUTE ON CHRONIC SYSTOLIC (CONGESTIVE) HEART FAILURE: ICD-10-CM

## 2022-03-25 NOTE — SWALLOW BEDSIDE ASSESSMENT ADULT - NS ASR SWALLOW FINDINGS DISCUS
Health Maintenance:  2nd attempt to reach patient to discuss advance directives. No answer.    Physician/Patient/Nursing

## 2023-10-21 NOTE — PATIENT PROFILE ADULT - NSPROPOAPRESSUREINJURYCT_GEN_A_NUR
Spoke with patient,  verified. Gave him Dr Grant Shoemaker instructions and number to call PT. Patient will call, but states may have problem with getting transportation. He wants Dr Berenice Michelle to know that Dr Puneet Davis gave him steroids. Message sent to Dr Berenice Michelle as James Sifuentes. 1